# Patient Record
Sex: FEMALE | Race: WHITE | Employment: OTHER | URBAN - METROPOLITAN AREA
[De-identification: names, ages, dates, MRNs, and addresses within clinical notes are randomized per-mention and may not be internally consistent; named-entity substitution may affect disease eponyms.]

---

## 2017-01-12 ENCOUNTER — ANESTHESIA EVENT (OUTPATIENT)
Dept: SURGERY | Age: 72
End: 2017-01-12
Payer: MEDICARE

## 2017-01-12 PROBLEM — R00.1 BRADYCARDIA: Status: ACTIVE | Noted: 2017-01-12

## 2017-01-12 RX ORDER — SODIUM CHLORIDE 0.9 % (FLUSH) 0.9 %
5-10 SYRINGE (ML) INJECTION AS NEEDED
Status: CANCELLED | OUTPATIENT
Start: 2017-01-12

## 2017-01-12 RX ORDER — HYDROMORPHONE HYDROCHLORIDE 2 MG/ML
0.5 INJECTION, SOLUTION INTRAMUSCULAR; INTRAVENOUS; SUBCUTANEOUS
Status: CANCELLED | OUTPATIENT
Start: 2017-01-12

## 2017-01-12 RX ORDER — NALOXONE HYDROCHLORIDE 0.4 MG/ML
0.1 INJECTION, SOLUTION INTRAMUSCULAR; INTRAVENOUS; SUBCUTANEOUS AS NEEDED
Status: CANCELLED | OUTPATIENT
Start: 2017-01-12

## 2017-01-12 RX ORDER — OXYCODONE HYDROCHLORIDE 5 MG/1
5 TABLET ORAL
Status: CANCELLED | OUTPATIENT
Start: 2017-01-12

## 2017-01-13 ENCOUNTER — SURGERY (OUTPATIENT)
Age: 72
End: 2017-01-13

## 2017-01-13 ENCOUNTER — ANESTHESIA (OUTPATIENT)
Dept: SURGERY | Age: 72
End: 2017-01-13
Payer: MEDICARE

## 2017-01-13 ENCOUNTER — HOSPITAL ENCOUNTER (OUTPATIENT)
Age: 72
Setting detail: OBSERVATION
Discharge: HOME | End: 2017-01-14
Attending: INTERNAL MEDICINE | Admitting: INTERNAL MEDICINE
Payer: MEDICARE

## 2017-01-13 DIAGNOSIS — R00.1 BRADYCARDIA: Primary | ICD-10-CM

## 2017-01-13 PROBLEM — Z95.0 PACEMAKER: Status: ACTIVE | Noted: 2017-01-13

## 2017-01-13 LAB
ALBUMIN SERPL BCP-MCNC: 3.7 G/DL (ref 3.2–4.6)
ALBUMIN/GLOB SERPL: 0.8 {RATIO} (ref 1.2–3.5)
ALP SERPL-CCNC: 66 U/L (ref 50–136)
ALT SERPL-CCNC: 25 U/L (ref 12–65)
ANION GAP BLD CALC-SCNC: 8 MMOL/L (ref 7–16)
APTT PPP: 27.7 SEC (ref 23.5–31.7)
AST SERPL W P-5'-P-CCNC: 22 U/L (ref 15–37)
ATRIAL RATE: 52 BPM
BILIRUB SERPL-MCNC: 0.4 MG/DL (ref 0.2–1.1)
BUN SERPL-MCNC: 25 MG/DL (ref 8–23)
CALCIUM SERPL-MCNC: 9.4 MG/DL (ref 8.3–10.4)
CALCULATED P AXIS, ECG09: NORMAL DEGREES
CALCULATED R AXIS, ECG10: 54 DEGREES
CALCULATED T AXIS, ECG11: 65 DEGREES
CHLORIDE SERPL-SCNC: 107 MMOL/L (ref 98–107)
CO2 SERPL-SCNC: 25 MMOL/L (ref 21–32)
CREAT SERPL-MCNC: 1.51 MG/DL (ref 0.6–1)
DIAGNOSIS, 93000: NORMAL
DIASTOLIC BP, ECG02: NORMAL MMHG
ERYTHROCYTE [DISTWIDTH] IN BLOOD BY AUTOMATED COUNT: 13.6 % (ref 11.9–14.6)
GLOBULIN SER CALC-MCNC: 4.4 G/DL (ref 2.3–3.5)
GLUCOSE BLD STRIP.AUTO-MCNC: 109 MG/DL (ref 65–100)
GLUCOSE BLD STRIP.AUTO-MCNC: 125 MG/DL (ref 65–100)
GLUCOSE BLD STRIP.AUTO-MCNC: 227 MG/DL (ref 65–100)
GLUCOSE SERPL-MCNC: 206 MG/DL (ref 65–100)
HCT VFR BLD AUTO: 46.7 % (ref 35.8–46.3)
HGB BLD-MCNC: 14.5 G/DL (ref 11.7–15.4)
INR PPP: 1 (ref 0.9–1.2)
MAGNESIUM SERPL-MCNC: 2.3 MG/DL (ref 1.8–2.4)
MCH RBC QN AUTO: 27.9 PG (ref 26.1–32.9)
MCHC RBC AUTO-ENTMCNC: 31 G/DL (ref 31.4–35)
MCV RBC AUTO: 89.8 FL (ref 79.6–97.8)
P-R INTERVAL, ECG05: NORMAL MS
PLATELET # BLD AUTO: 224 K/UL (ref 150–450)
PMV BLD AUTO: 11.5 FL (ref 10.8–14.1)
POTASSIUM SERPL-SCNC: 4.8 MMOL/L (ref 3.5–5.1)
PROT SERPL-MCNC: 8.1 G/DL (ref 6.3–8.2)
PROTHROMBIN TIME: 10.7 SEC (ref 9.6–12)
Q-T INTERVAL, ECG07: 386 MS
QRS DURATION, ECG06: 74 MS
QTC CALCULATION (BEZET), ECG08: 386 MS
RBC # BLD AUTO: 5.2 M/UL (ref 4.05–5.25)
SODIUM SERPL-SCNC: 140 MMOL/L (ref 136–145)
SYSTOLIC BP, ECG01: NORMAL MMHG
VENTRICULAR RATE, ECG03: 60 BPM
WBC # BLD AUTO: 7.4 K/UL (ref 4.3–11.1)

## 2017-01-13 PROCEDURE — 85730 THROMBOPLASTIN TIME PARTIAL: CPT | Performed by: INTERNAL MEDICINE

## 2017-01-13 PROCEDURE — 77030008467 HC STPLR SKN COVD -B

## 2017-01-13 PROCEDURE — 77030018579 HC SUT TICRN1 COVD -B

## 2017-01-13 PROCEDURE — 33208 INSRT HEART PM ATRIAL & VENT: CPT

## 2017-01-13 PROCEDURE — 85610 PROTHROMBIN TIME: CPT | Performed by: INTERNAL MEDICINE

## 2017-01-13 PROCEDURE — C1892 INTRO/SHEATH,FIXED,PEEL-AWAY: HCPCS

## 2017-01-13 PROCEDURE — 77030031139 HC SUT VCRL2 J&J -A

## 2017-01-13 PROCEDURE — 76060000033 HC ANESTHESIA 1 TO 1.5 HR: Performed by: INTERNAL MEDICINE

## 2017-01-13 PROCEDURE — C1785 PMKR, DUAL, RATE-RESP: HCPCS

## 2017-01-13 PROCEDURE — 99218 HC RM OBSERVATION: CPT

## 2017-01-13 PROCEDURE — 82962 GLUCOSE BLOOD TEST: CPT

## 2017-01-13 PROCEDURE — 74011636637 HC RX REV CODE- 636/637: Performed by: INTERNAL MEDICINE

## 2017-01-13 PROCEDURE — 85027 COMPLETE CBC AUTOMATED: CPT | Performed by: INTERNAL MEDICINE

## 2017-01-13 PROCEDURE — 74011000272 HC RX REV CODE- 272: Performed by: INTERNAL MEDICINE

## 2017-01-13 PROCEDURE — 74011250637 HC RX REV CODE- 250/637: Performed by: ANESTHESIOLOGY

## 2017-01-13 PROCEDURE — 77030012935 HC DRSG AQUACEL BMS -B

## 2017-01-13 PROCEDURE — 93005 ELECTROCARDIOGRAM TRACING: CPT | Performed by: INTERNAL MEDICINE

## 2017-01-13 PROCEDURE — C1898 LEAD, PMKR, OTHER THAN TRANS: HCPCS

## 2017-01-13 PROCEDURE — 80053 COMPREHEN METABOLIC PANEL: CPT | Performed by: INTERNAL MEDICINE

## 2017-01-13 PROCEDURE — 74011250637 HC RX REV CODE- 250/637: Performed by: INTERNAL MEDICINE

## 2017-01-13 PROCEDURE — 74011000250 HC RX REV CODE- 250

## 2017-01-13 PROCEDURE — A9270 NON-COVERED ITEM OR SERVICE: HCPCS | Performed by: INTERNAL MEDICINE

## 2017-01-13 PROCEDURE — 74011250636 HC RX REV CODE- 250/636: Performed by: ANESTHESIOLOGY

## 2017-01-13 PROCEDURE — 74011250636 HC RX REV CODE- 250/636

## 2017-01-13 PROCEDURE — L3670 SO ACRO/CLAV CAN WEB PRE OTS: HCPCS

## 2017-01-13 PROCEDURE — 74011000250 HC RX REV CODE- 250: Performed by: INTERNAL MEDICINE

## 2017-01-13 PROCEDURE — 74011250636 HC RX REV CODE- 250/636: Performed by: INTERNAL MEDICINE

## 2017-01-13 PROCEDURE — 83735 ASSAY OF MAGNESIUM: CPT | Performed by: INTERNAL MEDICINE

## 2017-01-13 RX ORDER — AMIODARONE HYDROCHLORIDE 150 MG/3ML
INJECTION, SOLUTION INTRAVENOUS AS NEEDED
Status: DISCONTINUED | OUTPATIENT
Start: 2017-01-13 | End: 2017-01-13 | Stop reason: HOSPADM

## 2017-01-13 RX ORDER — TEMAZEPAM 30 MG/1
30 CAPSULE ORAL
Status: DISCONTINUED | OUTPATIENT
Start: 2017-01-13 | End: 2017-01-14 | Stop reason: HOSPADM

## 2017-01-13 RX ORDER — VENLAFAXINE HYDROCHLORIDE 75 MG/1
75 CAPSULE, EXTENDED RELEASE ORAL DAILY
Status: DISCONTINUED | OUTPATIENT
Start: 2017-01-14 | End: 2017-01-14 | Stop reason: HOSPADM

## 2017-01-13 RX ORDER — METOPROLOL TARTRATE 5 MG/5ML
INJECTION INTRAVENOUS AS NEEDED
Status: DISCONTINUED | OUTPATIENT
Start: 2017-01-13 | End: 2017-01-13 | Stop reason: HOSPADM

## 2017-01-13 RX ORDER — MELOXICAM 7.5 MG/1
15 TABLET ORAL DAILY
Status: DISCONTINUED | OUTPATIENT
Start: 2017-01-14 | End: 2017-01-13 | Stop reason: SDUPTHER

## 2017-01-13 RX ORDER — SODIUM CHLORIDE 0.9 % (FLUSH) 0.9 %
5-10 SYRINGE (ML) INJECTION EVERY 8 HOURS
Status: DISCONTINUED | OUTPATIENT
Start: 2017-01-13 | End: 2017-01-13

## 2017-01-13 RX ORDER — METOPROLOL TARTRATE 50 MG/1
50 TABLET ORAL 2 TIMES DAILY
Status: DISCONTINUED | OUTPATIENT
Start: 2017-01-13 | End: 2017-01-14 | Stop reason: HOSPADM

## 2017-01-13 RX ORDER — MIDAZOLAM HYDROCHLORIDE 1 MG/ML
2 INJECTION, SOLUTION INTRAMUSCULAR; INTRAVENOUS
Status: DISCONTINUED | OUTPATIENT
Start: 2017-01-13 | End: 2017-01-13

## 2017-01-13 RX ORDER — CEPHALEXIN 500 MG/1
500 CAPSULE ORAL 3 TIMES DAILY
Status: DISCONTINUED | OUTPATIENT
Start: 2017-01-14 | End: 2017-01-14 | Stop reason: HOSPADM

## 2017-01-13 RX ORDER — SODIUM CHLORIDE 0.9 % (FLUSH) 0.9 %
5-10 SYRINGE (ML) INJECTION AS NEEDED
Status: DISCONTINUED | OUTPATIENT
Start: 2017-01-13 | End: 2017-01-13

## 2017-01-13 RX ORDER — SODIUM CHLORIDE, SODIUM LACTATE, POTASSIUM CHLORIDE, CALCIUM CHLORIDE 600; 310; 30; 20 MG/100ML; MG/100ML; MG/100ML; MG/100ML
100 INJECTION, SOLUTION INTRAVENOUS CONTINUOUS
Status: DISCONTINUED | OUTPATIENT
Start: 2017-01-13 | End: 2017-01-13

## 2017-01-13 RX ORDER — CEFAZOLIN SODIUM IN 0.9 % NACL 2 G/50 ML
2 INTRAVENOUS SOLUTION, PIGGYBACK (ML) INTRAVENOUS ONCE
Status: COMPLETED | OUTPATIENT
Start: 2017-01-13 | End: 2017-01-13

## 2017-01-13 RX ORDER — PROPOFOL 10 MG/ML
INJECTION, EMULSION INTRAVENOUS
Status: DISCONTINUED | OUTPATIENT
Start: 2017-01-13 | End: 2017-01-13 | Stop reason: HOSPADM

## 2017-01-13 RX ORDER — MELOXICAM 15 MG/1
15 TABLET ORAL DAILY
Status: DISCONTINUED | OUTPATIENT
Start: 2017-01-14 | End: 2017-01-14 | Stop reason: HOSPADM

## 2017-01-13 RX ORDER — SODIUM CHLORIDE 9 MG/ML
25 INJECTION, SOLUTION INTRAVENOUS CONTINUOUS
Status: DISCONTINUED | OUTPATIENT
Start: 2017-01-13 | End: 2017-01-13

## 2017-01-13 RX ORDER — PRAVASTATIN SODIUM 40 MG/1
80 TABLET ORAL
Status: DISCONTINUED | OUTPATIENT
Start: 2017-01-13 | End: 2017-01-14 | Stop reason: HOSPADM

## 2017-01-13 RX ORDER — SODIUM CHLORIDE 0.9 % (FLUSH) 0.9 %
5-10 SYRINGE (ML) INJECTION EVERY 8 HOURS
Status: DISCONTINUED | OUTPATIENT
Start: 2017-01-13 | End: 2017-01-14 | Stop reason: HOSPADM

## 2017-01-13 RX ORDER — FAMOTIDINE 20 MG/1
20 TABLET, FILM COATED ORAL ONCE
Status: DISCONTINUED | OUTPATIENT
Start: 2017-01-13 | End: 2017-01-13

## 2017-01-13 RX ORDER — PROPOFOL 10 MG/ML
INJECTION, EMULSION INTRAVENOUS AS NEEDED
Status: DISCONTINUED | OUTPATIENT
Start: 2017-01-13 | End: 2017-01-13 | Stop reason: HOSPADM

## 2017-01-13 RX ORDER — SODIUM CHLORIDE 0.9 % (FLUSH) 0.9 %
5-10 SYRINGE (ML) INJECTION AS NEEDED
Status: DISCONTINUED | OUTPATIENT
Start: 2017-01-13 | End: 2017-01-14 | Stop reason: HOSPADM

## 2017-01-13 RX ORDER — TEMAZEPAM 15 MG/1
30 CAPSULE ORAL
Status: DISCONTINUED | OUTPATIENT
Start: 2017-01-13 | End: 2017-01-13 | Stop reason: SDUPTHER

## 2017-01-13 RX ORDER — GLIMEPIRIDE 4 MG/1
4 TABLET ORAL 2 TIMES DAILY WITH MEALS
Status: DISCONTINUED | OUTPATIENT
Start: 2017-01-14 | End: 2017-01-14 | Stop reason: HOSPADM

## 2017-01-13 RX ORDER — CEFAZOLIN SODIUM IN 0.9 % NACL 2 G/50 ML
2 INTRAVENOUS SOLUTION, PIGGYBACK (ML) INTRAVENOUS EVERY 8 HOURS
Status: DISCONTINUED | OUTPATIENT
Start: 2017-01-13 | End: 2017-01-13 | Stop reason: SDUPTHER

## 2017-01-13 RX ORDER — LORAZEPAM 1 MG/1
1 TABLET ORAL
Status: DISCONTINUED | OUTPATIENT
Start: 2017-01-13 | End: 2017-01-14 | Stop reason: HOSPADM

## 2017-01-13 RX ORDER — CEFAZOLIN SODIUM IN 0.9 % NACL 2 G/50 ML
2 INTRAVENOUS SOLUTION, PIGGYBACK (ML) INTRAVENOUS EVERY 8 HOURS
Status: COMPLETED | OUTPATIENT
Start: 2017-01-14 | End: 2017-01-14

## 2017-01-13 RX ORDER — HYDROCODONE BITARTRATE AND ACETAMINOPHEN 7.5; 325 MG/1; MG/1
1 TABLET ORAL AS NEEDED
Status: DISCONTINUED | OUTPATIENT
Start: 2017-01-13 | End: 2017-01-14 | Stop reason: HOSPADM

## 2017-01-13 RX ORDER — PRAVASTATIN SODIUM 80 MG/1
80 TABLET ORAL
Status: DISCONTINUED | OUTPATIENT
Start: 2017-01-13 | End: 2017-01-13 | Stop reason: SDUPTHER

## 2017-01-13 RX ORDER — LIDOCAINE HYDROCHLORIDE 10 MG/ML
0.1 INJECTION INFILTRATION; PERINEURAL AS NEEDED
Status: DISCONTINUED | OUTPATIENT
Start: 2017-01-13 | End: 2017-01-13

## 2017-01-13 RX ORDER — METFORMIN HYDROCHLORIDE 500 MG/1
1000 TABLET ORAL 2 TIMES DAILY WITH MEALS
Status: DISCONTINUED | OUTPATIENT
Start: 2017-01-13 | End: 2017-01-14 | Stop reason: HOSPADM

## 2017-01-13 RX ORDER — BUPIVACAINE HYDROCHLORIDE AND EPINEPHRINE 5; 5 MG/ML; UG/ML
60 INJECTION, SOLUTION EPIDURAL; INTRACAUDAL; PERINEURAL ONCE
Status: COMPLETED | OUTPATIENT
Start: 2017-01-13 | End: 2017-01-13

## 2017-01-13 RX ORDER — INSULIN LISPRO 100 [IU]/ML
INJECTION, SOLUTION INTRAVENOUS; SUBCUTANEOUS
Status: DISCONTINUED | OUTPATIENT
Start: 2017-01-13 | End: 2017-01-14 | Stop reason: HOSPADM

## 2017-01-13 RX ORDER — FENTANYL CITRATE 50 UG/ML
100 INJECTION, SOLUTION INTRAMUSCULAR; INTRAVENOUS ONCE
Status: DISCONTINUED | OUTPATIENT
Start: 2017-01-13 | End: 2017-01-13

## 2017-01-13 RX ADMIN — METOPROLOL TARTRATE 2.5 MG: 5 INJECTION INTRAVENOUS at 17:53

## 2017-01-13 RX ADMIN — PROPOFOL 100 MCG/KG/MIN: 10 INJECTION, EMULSION INTRAVENOUS at 17:16

## 2017-01-13 RX ADMIN — CEFAZOLIN 2 G: 1 INJECTION, POWDER, FOR SOLUTION INTRAMUSCULAR; INTRAVENOUS; PARENTERAL at 17:15

## 2017-01-13 RX ADMIN — Medication 5 ML: at 22:17

## 2017-01-13 RX ADMIN — METOPROLOL TARTRATE 2.5 MG: 5 INJECTION INTRAVENOUS at 17:57

## 2017-01-13 RX ADMIN — NEOMYCIN AND POLYMYXIN B SULFATES: 40; 200000 IRRIGANT IRRIGATION at 18:00

## 2017-01-13 RX ADMIN — PROPOFOL 20 MG: 10 INJECTION, EMULSION INTRAVENOUS at 17:16

## 2017-01-13 RX ADMIN — INSULIN LISPRO 6 UNITS: 100 INJECTION, SOLUTION INTRAVENOUS; SUBCUTANEOUS at 22:00

## 2017-01-13 RX ADMIN — TEMAZEPAM 30 MG: 30 CAPSULE ORAL at 22:10

## 2017-01-13 RX ADMIN — HYDROCODONE BITARTRATE AND ACETAMINOPHEN 1 TABLET: 7.5; 325 TABLET ORAL at 18:46

## 2017-01-13 RX ADMIN — BUPIVACAINE HYDROCHLORIDE AND EPINEPHRINE 300 MG: 5; 5 INJECTION, SOLUTION EPIDURAL; INTRACAUDAL; PERINEURAL at 18:00

## 2017-01-13 RX ADMIN — SODIUM CHLORIDE, SODIUM LACTATE, POTASSIUM CHLORIDE, AND CALCIUM CHLORIDE: 600; 310; 30; 20 INJECTION, SOLUTION INTRAVENOUS at 17:12

## 2017-01-13 RX ADMIN — AMIODARONE HYDROCHLORIDE 150 MG: 150 INJECTION, SOLUTION INTRAVENOUS at 17:50

## 2017-01-13 RX ADMIN — LORAZEPAM 1 MG: 1 TABLET ORAL at 22:10

## 2017-01-13 RX ADMIN — METOPROLOL TARTRATE 50 MG: 50 TABLET ORAL at 22:27

## 2017-01-13 RX ADMIN — PRAVASTATIN SODIUM 80 MG: 40 TABLET ORAL at 22:15

## 2017-01-13 NOTE — ANESTHESIA PREPROCEDURE EVALUATION
Anesthetic History   No history of anesthetic complications            Review of Systems / Medical History  Patient summary reviewed and pertinent labs reviewed    Pulmonary    COPD      Smoker (Former smoker - quit 5 years ago)         Neuro/Psych   Within defined limits           Cardiovascular    Hypertension        Dysrhythmias : atrial flutter and atrial fibrillation      Exercise tolerance: <4 METS  Comments: Echo 11/2016 - normal LVEF   GI/Hepatic/Renal     GERD: well controlled      PUD     Endo/Other    Diabetes: well controlled, type 2  Hypothyroidism: well controlled  Obesity and arthritis     Other Findings   Comments: H/o DVT and PE (hospitalzied for 1 month) over 15 yrs ago           Physical Exam    Airway  Mallampati: III  TM Distance: < 4 cm  Neck ROM: normal range of motion   Mouth opening: Normal     Cardiovascular    Rhythm: irregular  Rate: normal         Dental    Dentition: Lower dentition intact and Upper partial plate     Pulmonary                 Abdominal  GI exam deferred       Other Findings            Anesthetic Plan    ASA: 3  Anesthesia type: total IV anesthesia          Induction: Intravenous  Anesthetic plan and risks discussed with: Patient

## 2017-01-13 NOTE — IP AVS SNAPSHOT
Radha Wetzel 
 
 
 [address] [id number] 
[phone] Patient: Earnestine Boxer MRN: [id number] [facility]:4/0/6860 You are allergic to the following Allergen Reactions Aspirin Nausea and Vomiting Recent Documentation Height Weight BMI OB Status Smoking Status 1.803 m 110.9 kg 34.1 kg/m2 Hysterectomy Former Smoker Emergency Contacts Name Discharge Info Relation Home Work Mobile JESSICA Amaya DISCHARGE CAREGIVER [3] Spouse [3] [phone] Brenda Ray DISCHARGE CAREGIVER [3] Daughter [21] [phone] About your hospitalization You were admitted on:  January 13, 2017 You last received care in the:  [facility] 3 CLINICAL OBSERVATION You were discharged on:  January 14, 2017 Unit phone number:  [phone] Why you were hospitalized Your primary diagnosis was:  Pacemaker Your diagnoses also included:  Bradycardia Providers Seen During Your Hospitalizations Provider Role Specialty Primary office phone Danielle Way MD Attending Provider Cardiology [phone] Your Primary Care Physician (PCP) Primary Care Physician Office Phone Office Fax Bobby Moore [phone] Follow-up Information Follow up With Details Comments Contact Info Orquidea Gold MD Schedule an appointment as soon as possible for a visit  [address] 
[phone] Danielle Way MD  The office will call you on Monday with a follow up appointment [place] [address] [place] [id number] [phone] Current Discharge Medication List  
  
START taking these medications Dose & Instructions Dispensing Information Comments Morning Noon Evening Bedtime  
 cephALEXin 500 mg capsule Commonly known as:  Suzelsi [place] Your next dose is: Today, Tomorrow Other:  _________ Dose:  500 mg Take 1 Cap by mouth three (3) times daily for 3 days. Quantity:  9 Cap Refills:  0 HYDROcodone-acetaminophen 7.5-325 mg per tablet Commonly known as:  Marcela Michelle Your next dose is: Today, Tomorrow Other:  _________ Dose:  1 Tab Take 1 Tab by mouth as needed. Quantity:  10 Tab Refills:  0  
     
   
   
   
  
 metoprolol tartrate 50 mg tablet Commonly known as:  LOPRESSOR Your next dose is: Today, Tomorrow Other:  _________ Dose:  50 mg Take 1 Tab by mouth two (2) times a day. Quantity:  60 Tab Refills:  6 CONTINUE these medications which have CHANGED Dose & Instructions Dispensing Information Comments Morning Noon Evening Bedtime  
 fluticasone 50 mcg/actuation nasal spray Commonly known as:  Lindsey Spangler What changed:   
- when to take this 
- reasons to take this Your next dose is: Today, Tomorrow Other:  _________ Dose:  2 Spray 2 Sprays by Both Nostrils route daily. Quantity:  1 Bottle Refills:  12 CONTINUE these medications which have NOT CHANGED Dose & Instructions Dispensing Information Comments Morning Noon Evening Bedtime  
 apixaban 5 mg tablet Commonly known as:  [place] Stade Your next dose is: Today, Tomorrow Other:  _________ Dose:  5 mg Take 1 Tab by mouth two (2) times a day. Quantity:  14 Tab Refills:  0  
     
   
   
   
  
 glimepiride 4 mg tablet Commonly known as:  AMARYL Your next dose is: Today, Tomorrow Other:  _________ Dose:  4 mg Take 1 Tab by mouth two (2) times a day. Quantity:  180 Tab Refills:  3 LORazepam 1 mg tablet Commonly known as:  ATIVAN Your next dose is: Today, Tomorrow Other:  _________ Dose:  1 mg Take 1 Tab by mouth every four (4) hours as needed for Anxiety. Max Daily Amount: 6 mg. Quantity:  90 Tab Refills:  5  
     
   
   
   
  
 meloxicam 15 mg tablet Commonly known as:  MOBIC Your next dose is: Today, Tomorrow Other:  _________ Dose:  15 mg Take 1 Tab by mouth daily. Quantity:  90 Tab Refills:  3  
     
   
   
   
  
 metFORMIN 1,000 mg tablet Commonly known as:  GLUCOPHAGE Your next dose is: Today, Tomorrow Other:  _________ Dose:  1000 mg Take 1 Tab by mouth two (2) times daily (with meals). Quantity:  180 Tab Refills:  3  
     
   
   
   
  
 pravastatin 40 mg tablet Commonly known as:  PRAVACHOL Your next dose is: Today, Tomorrow Other:  _________ Dose:  80 mg Take 2 Tabs by mouth nightly. Quantity:  180 Tab Refills:  3  
     
   
   
   
  
 temazepam 30 mg capsule Commonly known as:  RESTORIL Your next dose is: Today, Tomorrow Other:  _________ Dose:  30 mg Take 1 Cap by mouth nightly as needed for Sleep. Max Daily Amount: 30 mg.  
 Quantity:  90 Cap Refills:  3  
     
   
   
   
  
 venlafaxine-SR 75 mg capsule Commonly known as:  EFFEXOR-XR Your next dose is: Today, Tomorrow Other:  _________ Dose:  75 mg Take 1 Cap by mouth daily. Quantity:  90 Cap Refills:  3 Where to Get Your Medications Information on where to get these meds will be given to you by the nurse or doctor. ! Ask your nurse or doctor about these medications  
  cephALEXin 500 mg capsule HYDROcodone-acetaminophen 7.5-325 mg per tablet  
 metoprolol tartrate 50 mg tablet Discharge Instructions Pacemaker Placement: What to Expect at [facility] Your Recovery Pacemaker placement is surgery to put a pacemaker in your chest when you have bradycardia (a slow heart rate). A pacemaker is a small, battery-powered device that sends electrical signals to the heart to keep the heartbeat steady. Thin wires, called leads, carry the signals from the pacemaker to the heart. A pacemaker can prevent or reduce dizziness, fainting, and shortness of breath caused by a slow or unsteady heartbeat. Your chest may be sore where the doctor made the cut (incision) and put in the pacemaker. You also may have a bruise and mild swelling. These symptoms usually get better in 1 to 2 weeks. You may feel a hard ridge along the incision. This usually gets softer in the months after surgery. You may be able to see or feel the outline of the pacemaker under your skin. You will probably be able to go back to work or your usual routine 1 to 2 weeks after surgery. Pacemaker batteries usually last 5 to 15 years. Your doctor will talk to you about how often you will need to have your pacemaker checked. You can safely use most household and office electronics such as kitchen appliances, electric power tools, and computers. You will need to stay away from things with strong magnetic and electrical fields such as an MRI machine (unless your pacemaker is safe for an MRI), welding equipment, and power generators. You can use a cell phone, but keep it at least 6 inches away from your pacemaker. Check with your doctor about what you need to stay away from, what you need to use with care, and what is okay to use. This care sheet gives you a general idea about how long it will take for you to recover. But each person recovers at a different pace. Follow the steps below to get better as quickly as possible. How can you care for yourself at home? Activity · Rest when you feel tired. · Be active. Walking is a good choice. · For 4 to 6 weeks: ¨ Avoid activities that strain your chest or upper arm muscles. This includes pushing a  or vacuum, mopping floors, swimming, or swinging a golf club or tennis racquet. ¨ Do not raise your arm (the one on the side of your body where the pacemaker is located) above your shoulder. ¨ Allow your body to heal. Don't move quickly or lift anything heavy until you are feeling better. · Many people are able to return to work within 1 to 2 weeks after surgery. · Ask your doctor when it is okay for you to have sex. Diet · You can eat your normal diet. If your stomach is upset, try bland, low-fat foods like plain rice, broiled chicken, toast, and yogurt. Medicines · Your doctor will tell you if and when you can restart your medicines. He or she will also give you instructions about taking any new medicines. · If you take aspirin or some other blood thinner, be sure to talk to your doctor. He or she will tell you if and when to start taking this medicine again. Make sure that you understand exactly what your doctor wants you to do. · Be safe with medicines. Read and follow all instructions on the label. ¨ If the doctor gave you a prescription medicine for pain, take it as prescribed. ¨ If you are not taking a prescription pain medicine, ask your doctor if you can take an over-the-counter medicine. ¨ Do not take aspirin, ibuprofen (Advil, Motrin), naproxen (Aleve), or other nonsteroidal anti-inflammatory drugs (NSAIDs) unless your doctor says it is okay. · If your doctor prescribed antibiotics, take them as directed. Do not stop taking them just because you feel better. You need to take the full course of antibiotics. Incision care · If you have strips of tape on the incision, leave the tape on for a week or until it falls off. · Keep the incision dry while it heals. Your doctor may recommend sponge baths for about 7 days, but do not get the incision wet. Your doctor will let you know when you may take showers. After a shower, pat the incision dry.  
· Don't use hydrogen peroxide or alcohol on the incision, which can slow healing. You may cover the area with a gauze bandage if it oozes fluid or rubs against clothing. Change the bandage every day. · Do not take a bath or get into a hot tub for the first 2 weeks, or until your doctor tells you it is okay. Other instructions · Keep a medical ID card with you at all times that says you have a pacemaker. The card should include the  and model information. · Wear medical alert jewelry stating that you have a pacemaker. You can buy this at most [employer]. · Check your pulse as directed by your doctor. · Have your pacemaker checked as often as your doctor recommends. In some cases, this may be done over the phone or the Internet. Your doctor will give you instructions about how to do this. Follow-up care is a key part of your treatment and safety. Be sure to make and go to all appointments, and call your doctor if you are having problems. It's also a good idea to know your test results and keep a list of the medicines you take. When should you call for help? Call 911 anytime you think you may need emergency care. For example, call if: 
· You passed out (lost consciousness). · You have severe trouble breathing. · You have sudden chest pain and shortness of breath, or you cough up blood. · You have symptoms of a heart attack. These may include: ¨ Chest pain or pressure, or a strange feeling in the chest. 
¨ Sweating. ¨ Shortness of breath. ¨ Nausea or vomiting. ¨ Pain, pressure, or a strange feeling in the back, neck, jaw, or upper belly or in one or both shoulders or arms. ¨ Lightheadedness or sudden weakness. ¨ A fast or irregular heartbeat. After you call 911, the  may tell you to chew 1 adult-strength or 2 to 4 low-dose aspirin. Wait for an ambulance. Do not try to drive yourself. · You have symptoms of a stroke.  These may include: 
¨ Sudden numbness, tingling, weakness, or loss of movement in your face, arm, or leg, especially on only one side of your body. ¨ Sudden vision changes. ¨ Sudden trouble speaking. ¨ Sudden confusion or trouble understanding simple statements. ¨ Sudden problems with walking or balance. ¨ A sudden, severe headache that is different from past headaches. Call your doctor now or seek immediate medical care if: 
· Your heartbeat feels very fast or slow, skips beats, or flutters. · You are dizzy or lightheaded, or you feel like you may faint. · You have new or increased shortness of breath. · You have pain that does not get better after you take pain medicine. · You have loose stitches, or your incision comes open. · Bright red blood has soaked through the bandage over your incision. · You have signs of infection, such as: 
¨ Increased pain, swelling, warmth, or redness. ¨ Red streaks leading from the incision. ¨ Pus draining from the incision. ¨ A fever. · You have signs of a blood clot, such as: 
¨ Pain in your calf, back of the knee, thigh, or groin. ¨ Redness and swelling in your leg or groin. Watch closely for changes in your health, and be sure to contact your doctor if: 
· You have any problems with your pacemaker. Where can you learn more? Go to http://james-jannet.info/. Enter P344 in the search box to learn more about \"Pacemaker Placement: What to Expect at Home. \" Current as of: August 4, 2016 Content Version: 11.1 © 3568-0260 [employer]. Care instructions adapted under license by [employer] (which disclaims liability or warranty for this information). If you have questions about a medical condition or this instruction, always ask your healthcare professional. [address] [address] any warranty or liability for your use of this information. CARDIAC DEVICE INSTRUCTION SHEET 1.    You should have received a 1-2 weeks follow up appointment upon discharge from the hospital.  If you did not receive this appointment prior to leaving the hospital, please contact us at [phone]. 2.  Keep your incision dry for 14 days. DO NOT put ointments, creams or lotions on the incision for 2 weeks. 3.  Your dressing will be removed at your follow up appointment. If you have sutures/staples, the nurse will remove them at the follow up appointment. 4.  Call us IMMEDIATELY if you develop fever, pain, redness, or drainage at the incision site. 5.  You may use your pacemaker/ICD arm, but keep your arm lower than your shoulder for the first 8 weeks (or until cleared by a physician) to prevent the device lead(s) from moving. 6.  Do not lift more than 10 pounds for 4 weeks and 20 pounds for 8 weeks. 7.  Within 6 weeks you should receive your cardiac device ID card. Carry your card with you at all times. Always show it to any doctor or dentist you see. 8.    You will need to have your device evaluated every 3 months via office, remote, or telephone. 9.  Microwaves WILL NOT harm your device. Warnings do not apply to you. 10.  At airports, always show your cardiac device ID card. You may walk through metal detectors but do not allow the hand held wand near your device. 11.  DO NOT have an MRI without contacting your cardiologists office first.  
 
12.  Please refer to the education booklet given to you at the hospital for the activities and equipment you should avoid. Some may reprogram or interfere with your cardiac device. DISCHARGE SUMMARY from Nurse The following personal items are in your possession at time of discharge: 
 
Dental Appliances: Uppers, With patient Home Medications: Sent to pharmacy Jewelry: None Clothing: At bedside Other Valuables: None PATIENT INSTRUCTIONS: 
 
 
F-face looks uneven A-arms unable to move or move unevenly S-speech slurred or non-existent T-time-call 911 as soon as signs and symptoms begin-DO NOT go Back to bed or wait to see if you get better-TIME IS BRAIN. Warning Signs of HEART ATTACK Call 911 if you have these symptoms: 
? Chest discomfort. Most heart attacks involve discomfort in the center of the chest that lasts more than a few minutes, or that goes away and comes back. It can feel like uncomfortable pressure, squeezing, fullness, or pain. ? Discomfort in other areas of the upper body. Symptoms can include pain or discomfort in one or both arms, the back, neck, jaw, or stomach. ? Shortness of breath with or without chest discomfort. ? Other signs may include breaking out in a cold sweat, nausea, or lightheadedness. Don't wait more than five minutes to call [address]! Fast action can save your life. Calling 911 is almost always the fastest way to get lifesaving treatment. Emergency Medical Services staff can begin treatment when they arrive  up to an hour sooner than if someone gets to the hospital by car. The discharge information has been reviewed with the patient. The patient verbalized understanding. Discharge medications reviewed with the patient and appropriate educational materials and side effects teaching were provided. Discharge Orders None [facility] Announcement We are excited to announce that we are making your provider's discharge notes available to you in [employer]. You will see these notes when they are completed and signed by the physician that discharged you from your recent hospital stay. If you have any questions or concerns about any information you see in [facility], please call the Health Information Department where you were seen or reach out to your Primary Care Provider for more information about your plan of care. Introducing [facility]!    
 Hanh Alcocer introduces [employer] patient portal. Now you can access parts of your medical record, email your doctor's office, and request medication refills online. 1. In your internet browser, go to [email or web address] 2. Click on the First Time User? Click Here link in the Sign In box. You will see the New Member Sign Up page. 3. Enter your [facility] Access Code exactly as it appears below. You will not need to use this code after youve completed the sign-up process. If you do not sign up before the expiration date, you must request a new code. · [facility] Access Code: [email or web address] Expires: 1/18/2017  8:51 AM 
 
4. Enter the last four digits of your Social Security Number (xxxx) and Date of Birth (mm/dd/yyyy) as indicated and click Submit. You will be taken to the next sign-up page. 5. Create a [facility] ID. This will be your [facility] login ID and cannot be changed, so think of one that is secure and easy to remember. 6. Create a [facility] password. You can change your password at any time. 7. Enter your Password Reset Question and Answer. This can be used at a later time if you forget your password. 8. Enter your e-mail address. You will receive e-mail notification when new information is available in [address] 9. Click Sign Up. You can now view and download portions of your medical record. 10. Click the Download Summary menu link to download a portable copy of your medical information. If you have questions, please visit the Frequently Asked Questions section of the [facility] website. Remember, [facility] is NOT to be used for urgent needs. For medical emergencies, dial 911. Now available from your iPhone and Android! General Information Please provide this summary of care documentation to your next provider. Patient Signature:  ____________________________________________________________ Date:  ____________________________________________________________  
  
[place]  Provider Signature: ____________________________________________________________ Date:  ____________________________________________________________

## 2017-01-13 NOTE — PROGRESS NOTES
Report received from  Cath Lab RN. Procedural findings communicated. Intra procedural  medication administration reviewed. Progression of care discussed. Patient received into [id number] [facility] 1 post procedure.      Incision site without bleeding or swelling yes    Dressing dry and intact yes    Patient instructed to limit movement to left upper extremity    Routine post procedural vital signs and site assessment initiated yes

## 2017-01-13 NOTE — ANESTHESIA POSTPROCEDURE EVALUATION
Post-Anesthesia Evaluation and Assessment    Patient: Jeff Barger MRN: [id number]  SSN: [id number]    YOB: 1945  Age: 70 y.o. Sex: female       Cardiovascular Function/Vital Signs  Visit Vitals    /70    Pulse 69    Temp 36.4 °C (97.5 °F)    Resp 17    Ht 5' 11\" (1.803 m)    Wt 110.7 kg (244 lb)    SpO2 94%    BMI 34.03 kg/m2       Patient is status post total IV anesthesia anesthesia for Procedure(s):  PACEMAKER INSERTION. Nausea/Vomiting: None    Postoperative hydration reviewed and adequate. Pain:  Pain Scale 1: Numeric (0 - 10) (01/13/17 1301)  Pain Intensity 1: 0 (01/13/17 1301)   Managed    Neurological Status: At baseline    Mental Status and Level of Consciousness: Arousable    Pulmonary Status:   O2 Device: Nasal cannula (01/13/17 2744)   Adequate oxygenation and airway patent    Complications related to anesthesia: None    Post-anesthesia assessment completed.  No concerns    Signed By: Radha Wang MD     January 13, 2017

## 2017-01-13 NOTE — IP AVS SNAPSHOT
Current Discharge Medication List  
  
Take these medications at their scheduled times Dose & Instructions Dispensing Information Comments Morning Noon Evening Bedtime  
 apixaban 5 mg tablet Commonly known as:  Jimbo Ramirez Your next dose is: Today, Tomorrow Other:  ____________ Dose:  5 mg Take 1 Tab by mouth two (2) times a day. Quantity:  14 Tab Refills:  0  
     
   
   
   
  
 cephALEXin 500 mg capsule Commonly known as:  Delwyn Pay Your next dose is: Today, Tomorrow Other:  ____________ Dose:  500 mg Take 1 Cap by mouth three (3) times daily for 3 days. Quantity:  9 Cap Refills:  0  
     
   
   
   
  
 fluticasone 50 mcg/actuation nasal spray Commonly known as:  Albert Means Your next dose is: Today, Tomorrow Other:  ____________ Dose:  2 Spray 2 Sprays by Both Nostrils route daily. Quantity:  1 Bottle Refills:  12  
     
   
   
   
  
 glimepiride 4 mg tablet Commonly known as:  AMARYL Your next dose is: Today, Tomorrow Other:  ____________ Dose:  4 mg Take 1 Tab by mouth two (2) times a day. Quantity:  180 Tab Refills:  3  
     
   
   
   
  
 meloxicam 15 mg tablet Commonly known as:  MOBIC Your next dose is: Today, Tomorrow Other:  ____________ Dose:  15 mg Take 1 Tab by mouth daily. Quantity:  90 Tab Refills:  3  
     
   
   
   
  
 metFORMIN 1,000 mg tablet Commonly known as:  GLUCOPHAGE Your next dose is: Today, Tomorrow Other:  ____________ Dose:  1000 mg Take 1 Tab by mouth two (2) times daily (with meals). Quantity:  180 Tab Refills:  3  
     
   
   
   
  
 metoprolol tartrate 50 mg tablet Commonly known as:  LOPRESSOR Your next dose is: Today, Tomorrow Other:  ____________ Dose:  50 mg Take 1 Tab by mouth two (2) times a day. Quantity:  60 Tab Refills:  6  
     
   
   
   
  
 pravastatin 40 mg tablet Commonly known as:  PRAVACHOL Your next dose is: Today, Tomorrow Other:  ____________ Dose:  80 mg Take 2 Tabs by mouth nightly. Quantity:  180 Tab Refills:  3  
     
   
   
   
  
 venlafaxine-SR 75 mg capsule Commonly known as:  EFFEXOR-XR Your next dose is: Today, Tomorrow Other:  ____________ Dose:  75 mg Take 1 Cap by mouth daily. Quantity:  90 Cap Refills:  3 Take these medications as needed Dose & Instructions Dispensing Information Comments Morning Noon Evening Bedtime HYDROcodone-acetaminophen 7.5-325 mg per tablet Commonly known as:  Lenon Gauze Your next dose is: Today, Tomorrow Other:  ____________ Dose:  1 Tab Take 1 Tab by mouth as needed. Quantity:  10 Tab Refills:  0 LORazepam 1 mg tablet Commonly known as:  ATIVAN Your next dose is: Today, Tomorrow Other:  ____________ Dose:  1 mg Take 1 Tab by mouth every four (4) hours as needed for Anxiety. Max Daily Amount: 6 mg. Quantity:  90 Tab Refills:  5  
     
   
   
   
  
 temazepam 30 mg capsule Commonly known as:  RESTORIL Your next dose is: Today, Tomorrow Other:  ____________ Dose:  30 mg Take 1 Cap by mouth nightly as needed for Sleep. Max Daily Amount: 30 mg.  
 Quantity:  90 Cap Refills:  3 Where to Get Your Medications Information about where to get these medications is not yet available ! Ask your nurse or doctor about these medications  
  cephALEXin 500 mg capsule HYDROcodone-acetaminophen 7.5-325 mg per tablet  
 metoprolol tartrate 50 mg tablet

## 2017-01-13 NOTE — PROCEDURES
PRE-ELECTROPHYSIOLOGY STUDY DIAGNOSES:  1. Bradycardia due to nonreversible sinus node dysfunction with HR<60 BPM    PROCEDURE PERFORMED:  1. Insertion of a Biotronik MRI DDDR pacemaker. Anesthesia: MAC     Estimated Blood Loss: Less than 10 mL     Specimens: * No specimens in log *     PROCEDURE IN DETAIL: The patient was brought into the EP lab in a fasting  state. The left shoulder was prepped and draped in the usual sterile  fashion. Skin anesthetized with lidocaine with epinephrine. Incision made  in the region of the deltopectoral groove. Pocket was made for the  pacemaker. Access obtained in the left subclavian vein via  the Seldinger technique x 2 over which 2 separate guidewires were placed. Over the first guidewire, a 7-[place] sheath was placed through which a  Biotronik right ventricle lead, model Setrox , 52 cm was placed. The lead  achieved the following values: R waves 6.1mV, threshold  was 1.6 V at 0.5 msec pulse width. This lead was then secured to the  pectoral fascia with 0 Ti-Cron x2. Over the second guidewire, a 7-[place] sheath was placed through which a Biotronik right  atrial lead, model Solia , 45 cm was placed, which achieved the following  values: P waves were 3.7mV, threshold was 1.2 volt at 0.4msec pulse width. This lead was then secured to the pectoral fascia with 0 Ti-Cron x2. Pocket was irrigated with triple antibiotic flush. The leads were  connected to a Biotronik pacemaker, model Eluna 8 DR-T  serial [id number]. The leads and pacemaker were then placed  in the pocket area. Fascial layer was closed with 2-0 Vicryl, followed by  a next layer of 3-0 Vicryl, followed by a superficial layer of staples. The wound was dressed. The patient recovered from his sedation and  transferred from the lab in a stable condition.     IMPRESSION: Successful implantation of a Biotronik MRI  pacemaker  for treatment of symptomatic sinus node dysfunction bradycardia

## 2017-01-13 NOTE — PROGRESS NOTES
TRANSFER - OUT REPORT:    Verbal report given to [employer] on [employer]  being transferred to [facility](unit) for routine progression of care       Report consisted of patients Situation, Background, Assessment and   Recommendations(SBAR). Information from the following report(s) Procedure Summary was reviewed with the receiving nurse. Lines:   Peripheral IV 01/13/17 Left Femoral (Active)        Opportunity for questions and clarification was provided.

## 2017-01-14 ENCOUNTER — APPOINTMENT (OUTPATIENT)
Dept: GENERAL RADIOLOGY | Age: 72
End: 2017-01-14
Attending: INTERNAL MEDICINE
Payer: MEDICARE

## 2017-01-14 VITALS
SYSTOLIC BLOOD PRESSURE: 155 MMHG | HEART RATE: 70 BPM | RESPIRATION RATE: 18 BRPM | OXYGEN SATURATION: 90 % | TEMPERATURE: 97.9 F | BODY MASS INDEX: 34.23 KG/M2 | HEIGHT: 71 IN | DIASTOLIC BLOOD PRESSURE: 76 MMHG | WEIGHT: 244.5 LBS

## 2017-01-14 LAB — GLUCOSE BLD STRIP.AUTO-MCNC: 155 MG/DL (ref 65–100)

## 2017-01-14 PROCEDURE — 74011250637 HC RX REV CODE- 250/637: Performed by: INTERNAL MEDICINE

## 2017-01-14 PROCEDURE — 71020 XR CHEST PA LAT: CPT

## 2017-01-14 PROCEDURE — 74011250636 HC RX REV CODE- 250/636: Performed by: INTERNAL MEDICINE

## 2017-01-14 PROCEDURE — 74011636637 HC RX REV CODE- 636/637: Performed by: INTERNAL MEDICINE

## 2017-01-14 PROCEDURE — A9270 NON-COVERED ITEM OR SERVICE: HCPCS | Performed by: INTERNAL MEDICINE

## 2017-01-14 PROCEDURE — 99218 HC RM OBSERVATION: CPT

## 2017-01-14 PROCEDURE — 74011250637 HC RX REV CODE- 250/637: Performed by: ANESTHESIOLOGY

## 2017-01-14 PROCEDURE — 82962 GLUCOSE BLOOD TEST: CPT

## 2017-01-14 RX ORDER — CEPHALEXIN 500 MG/1
500 CAPSULE ORAL 3 TIMES DAILY
Qty: 9 CAP | Refills: 0 | Status: SHIPPED | OUTPATIENT
Start: 2017-01-14 | End: 2017-01-17

## 2017-01-14 RX ORDER — METOPROLOL TARTRATE 50 MG/1
50 TABLET ORAL 2 TIMES DAILY
Qty: 60 TAB | Refills: 6 | Status: SHIPPED | OUTPATIENT
Start: 2017-01-14 | End: 2017-07-11 | Stop reason: SDUPTHER

## 2017-01-14 RX ORDER — HYDROCODONE BITARTRATE AND ACETAMINOPHEN 7.5; 325 MG/1; MG/1
1 TABLET ORAL AS NEEDED
Qty: 10 TAB | Refills: 0 | Status: SHIPPED | OUTPATIENT
Start: 2017-01-14 | End: 2017-05-04

## 2017-01-14 RX ADMIN — INSULIN LISPRO 3 UNITS: 100 INJECTION, SOLUTION INTRAVENOUS; SUBCUTANEOUS at 07:40

## 2017-01-14 RX ADMIN — Medication 5 ML: at 04:30

## 2017-01-14 RX ADMIN — CEFAZOLIN 2 G: 1 INJECTION, POWDER, FOR SOLUTION INTRAMUSCULAR; INTRAVENOUS; PARENTERAL at 07:45

## 2017-01-14 RX ADMIN — CEFAZOLIN 2 G: 1 INJECTION, POWDER, FOR SOLUTION INTRAMUSCULAR; INTRAVENOUS; PARENTERAL at 01:31

## 2017-01-14 RX ADMIN — METOPROLOL TARTRATE 50 MG: 50 TABLET ORAL at 07:41

## 2017-01-14 RX ADMIN — HYDROCODONE BITARTRATE AND ACETAMINOPHEN 1 TABLET: 7.5; 325 TABLET ORAL at 07:40

## 2017-01-14 RX ADMIN — VENLAFAXINE HYDROCHLORIDE 75 MG: 75 CAPSULE, EXTENDED RELEASE ORAL at 09:19

## 2017-01-14 RX ADMIN — MELOXICAM 15 MG: 15 TABLET ORAL at 09:21

## 2017-01-14 RX ADMIN — GLIMEPIRIDE 4 MG: 4 TABLET ORAL at 09:20

## 2017-01-14 NOTE — PROGRESS NOTES
Bedside and Verbal shift change report given to Toyin Shea RN (oncoming nurse) by self Kiana Hint nurse). Report included the following information SBAR, Kardex, MAR and Recent Results.

## 2017-01-14 NOTE — DISCHARGE SUMMARY
[facility] Discharge Summary     Patient ID:  Khanh Oviedo  [id number]  89 y.o.  1945    Admit date: 1/13/2017    Discharge date:  1/14/2017    Admitting Physician: Lashawn Calderon MD     Discharge Physician: SALO Machado/Dr. Krista Barker    Admission Diagnoses: Bradycardia [R00.1]    Discharge Diagnoses:   Patient Active Problem List    Diagnosis Date Noted    Pacemaker 01/13/2017    Bradycardia 01/12/2017    [facility] flutter ([facility] 10/21/2016    Essential hypertension 10/21/2016    H/O breast implant 04/17/2013    Tobacco abuse 04/17/2013    Chronic bronchitis ([facility] 04/17/2013    Anxiety 04/17/2013    Insomnia 04/17/2013    Surgical menopause 04/17/2013    Non Hodgkin's lymphoma ([facility] 04/17/2013    history of PUD (peptic ulcer disease) 04/17/2013    GERD (gastroesophageal reflux disease) 04/17/2013    Hyperlipidemia 04/17/2013    DM type 2 (diabetes mellitus, type 2) ([facility] 04/17/2013       Cardiology Procedures this admission:  implantation of dual chamber pacemaker  Consults: None    Hospital Course: Patient was seen at the office of [facility] by Dr. Darrian Brown in follow up. She complained of ongoing weakness and fatigue. EKG showed sinus bradycardia with junctional escape. He was subsequently scheduled for a pacemaker implantation at [facility] on 1/13/17. Patient was taken to the EP lab and underwent Biotronik MRI safe dual chamber pacemaker implantation by Dr. Darrian Brown. Patient tolerated the procedure well and was taken to the telemetry floor for recovery. Follow up chest xray showed no pneumothorax. The following morning patient was up feeling well without any complaints of chest pain, shortness of breath, or palpitations. Patient's left subclavian site was clean, dry and intact without hematoma. Patient was determined stable and ready for discharge. Patient was instructed on the importance of medication compliance and outpatient follow up.   The patient will follow up with [facility] for device check in 10-14 days. DISPOSITION: Patient has been instructed to keep affected arm below shoulder level for the next 4 weeks or until cleared by doctor. The arm sling should be worn while sleeping. The dressing will be removed at follow up visit. The incision site must be kept clean and dry. The patient may shower in a few days. Lotions, powders, or creams should be avoided as these can increase the risk of infection. The affected arm should not be used for any pushing, pulling, or lifting until cleared by doctor. Driving is prohibited until cleared by doctor in a follow up appointment. Any signs of infection including increased redness, suspicious drainage, or unexplained fever should be reported to the doctor immediately by calling [phone].           Discharge Exam:   Visit Vitals    /76 (BP 1 Location: Right arm, BP Patient Position: At rest)    Pulse 70    Temp 97.9 °F (36.6 °C)    Resp 18    Ht 5' 11\" (1.803 m)    Wt 110.9 kg (244 lb 8 oz)    SpO2 90%    BMI 34.1 kg/m2      Physical Exam:  General: Well Developed, Well Nourished, No Acute Distress, Alert & Oriented  Neck: supple, no JVD  Heart: S1S2 with RRR  Lungs: Clear throughout auscultation bilaterally without adventitious sounds  Abd: soft, nontender, nondistended, with good bowel sounds  Ext: warm, no edema, calves supple/nontender, pulses 2+ bilaterally  Left subclavian site: clean, dry, and intact without hematoma or bleeding  Skin: warm and dry      Recent Results (from the past 24 hour(s))   CBC W/O DIFF    Collection Time: 01/13/17  1:14 PM   Result Value Ref Range    WBC 7.4 4.3 - 11.1 K/uL    RBC 5.20 4.05 - 5.25 M/uL    HGB 14.5 11.7 - 15.4 g/dL    HCT 46.7 (H) 35.8 - 46.3 %    MCV 89.8 79.6 - 97.8 FL    MCH 27.9 26.1 - 32.9 PG    MCHC 31.0 (L) 31.4 - 35.0 g/dL    RDW 13.6 11.9 - 14.6 %    PLATELET 328 629 - 033 K/uL    MPV 11.5 10.8 - 14.1 FL   MAGNESIUM    Collection Time: 01/13/17  1:14 PM   Result Value Ref Range    Magnesium 2.3 1.8 - 2.4 mg/dL   METABOLIC PANEL, COMPREHENSIVE    Collection Time: 01/13/17  1:14 PM   Result Value Ref Range    Sodium 140 136 - 145 mmol/L    Potassium 4.8 3.5 - 5.1 mmol/L    Chloride 107 98 - 107 mmol/L    CO2 25 21 - 32 mmol/L    Anion gap 8 7 - 16 mmol/L    Glucose 206 (H) 65 - 100 mg/dL    BUN 25 (H) 8 - 23 MG/DL    Creatinine 1.51 (H) 0.6 - 1.0 MG/DL    GFR est AA 44 (L) >60 ml/min/1.73m2    GFR est non-AA 36 (L) >60 ml/min/1.73m2    Calcium 9.4 8.3 - 10.4 MG/DL    Bilirubin, total 0.4 0.2 - 1.1 MG/DL    ALT 25 12 - 65 U/L    AST 22 15 - 37 U/L    Alk.  phosphatase 66 50 - 136 U/L    Protein, total 8.1 6.3 - 8.2 g/dL    Albumin 3.7 3.2 - 4.6 g/dL    Globulin 4.4 (H) 2.3 - 3.5 g/dL    A-G Ratio 0.8 (L) 1.2 - 3.5     PROTHROMBIN TIME + INR    Collection Time: 01/13/17  1:14 PM   Result Value Ref Range    Prothrombin time 10.7 9.6 - 12.0 sec    INR 1.0 0.9 - 1.2     PTT    Collection Time: 01/13/17  1:14 PM   Result Value Ref Range    aPTT 27.7 23.5 - 31.7 SEC   EKG, 12 LEAD, INITIAL    Collection Time: 01/13/17  1:47 PM   Result Value Ref Range    Systolic BP  mmHg    Diastolic BP  mmHg    Ventricular Rate 60 BPM    Atrial Rate 52 BPM    P-R Interval  ms    QRS Duration 74 ms    Q-T Interval 386 ms    QTC Calculation (Bezet) 386 ms    Calculated P Axis  degrees    Calculated R Axis 54 degrees    Calculated T Axis 65 degrees    Diagnosis       Marked sinus bradycardia  Junctional bradycardia  Anteroseptal infarct (cited on or before 24-JUL-2013)  Abnormal ECG  When compared with ECG of 22-DEC-2016 07:53,  Non-specific change in ST segment in Inferior leads  Confirmed by Cindy Green MD (), OCTAVIO MENDOZA ([id number]) on 1/13/2017 2:04:34 PM     GLUCOSE, POC    Collection Time: 01/13/17  5:28 PM   Result Value Ref Range    Glucose (POC) 109 (H) 65 - 100 mg/dL   GLUCOSE, POC    Collection Time: 01/13/17  6:20 PM   Result Value Ref Range    Glucose (POC) 125 (H) 65 - 100 mg/dL   EKG, 12 LEAD, INITIAL    Collection Time: 01/13/17  6:38 PM   Result Value Ref Range    Systolic BP  mmHg    Diastolic BP  mmHg    Ventricular Rate 70 BPM    Atrial Rate 70 BPM    P-R Interval 168 ms    QRS Duration 78 ms    Q-T Interval 378 ms    QTC Calculation (Bezet) 408 ms    Calculated P Axis 46 degrees    Calculated R Axis 53 degrees    Calculated T Axis 49 degrees    Diagnosis       Electronic atrial pacemaker  Low voltage QRS  Cannot rule out Anteroseptal infarct , age undetermined  Abnormal ECG     GLUCOSE, POC    Collection Time: 01/13/17 10:02 PM   Result Value Ref Range    Glucose (POC) 227 (H) 65 - 100 mg/dL   GLUCOSE, POC    Collection Time: 01/14/17  6:10 AM   Result Value Ref Range    Glucose (POC) 155 (H) 65 - 100 mg/dL         Patient Instructions:   Current Discharge Medication List      START taking these medications    Details   metoprolol tartrate (LOPRESSOR) 50 mg tablet Take 1 Tab by mouth two (2) times a day. Qty: 60 Tab, Refills: 6    Associated Diagnoses: Bradycardia      cephALEXin (KEFLEX) 500 mg capsule Take 1 Cap by mouth three (3) times daily for 3 days. Qty: 9 Cap, Refills: 0    Associated Diagnoses: Bradycardia      HYDROcodone-acetaminophen (NORCO) 7.5-325 mg per tablet Take 1 Tab by mouth as needed. Qty: 10 Tab, Refills: 0         CONTINUE these medications which have NOT CHANGED    Details   LORazepam (ATIVAN) 1 mg tablet Take 1 Tab by mouth every four (4) hours as needed for Anxiety. Max Daily Amount: 6 mg. Qty: 90 Tab, Refills: 5      pravastatin (PRAVACHOL) 40 mg tablet Take 2 Tabs by mouth nightly. Qty: 180 Tab, Refills: 3    Associated Diagnoses: Mixed hyperlipidemia      metFORMIN (GLUCOPHAGE) 1,000 mg tablet Take 1 Tab by mouth two (2) times daily (with meals).   Qty: 180 Tab, Refills: 3    Associated Diagnoses: Type 2 diabetes mellitus without complication, without long-term current use of insulin (HCC)      meloxicam (MOBIC) 15 mg tablet Take 1 Tab by mouth daily. Qty: 90 Tab, Refills: 3      glimepiride (AMARYL) 4 mg tablet Take 1 Tab by mouth two (2) times a day. Qty: 180 Tab, Refills: 3    Associated Diagnoses: Type 2 diabetes mellitus without complication, without long-term current use of insulin ([facility])      temazepam (RESTORIL) 30 mg capsule Take 1 Cap by mouth nightly as needed for Sleep. Max Daily Amount: 30 mg.  Qty: 90 Cap, Refills: 3      venlafaxine-SR (EFFEXOR-XR) 75 mg capsule Take 1 Cap by mouth daily. Qty: 90 Cap, Refills: 3      fluticasone (FLONASE) 50 mcg/actuation nasal spray 2 Sprays by Both Nostrils route daily. Qty: 1 Bottle, Refills: 12    Associated Diagnoses: Allergic rhinitis, unspecified allergic rhinitis type      apixaban (ELIQUIS) 5 mg tablet Take 1 Tab by mouth two (2) times a day.   Qty: 14 Tab, Refills: 0    Associated Diagnoses: Atrial fibrillation, unspecified type ([facility]               Signed:  Chen Lo PA-C  1/14/2017  9:14 AM

## 2017-01-14 NOTE — DISCHARGE INSTRUCTIONS
Pacemaker Placement: What to Expect at [facility] placement is surgery to put a pacemaker in your chest when you have bradycardia (a slow heart rate). A pacemaker is a small, battery-powered device that sends electrical signals to the heart to keep the heartbeat steady. Thin wires, called leads, carry the signals from the pacemaker to the heart. A pacemaker can prevent or reduce dizziness, fainting, and shortness of breath caused by a slow or unsteady heartbeat. Your chest may be sore where the doctor made the cut (incision) and put in the pacemaker. You also may have a bruise and mild swelling. These symptoms usually get better in 1 to 2 weeks. You may feel a hard ridge along the incision. This usually gets softer in the months after surgery. You may be able to see or feel the outline of the pacemaker under your skin. You will probably be able to go back to work or your usual routine 1 to 2 weeks after surgery. Pacemaker batteries usually last 5 to 15 years. Your doctor will talk to you about how often you will need to have your pacemaker checked. You can safely use most household and office electronics such as kitchen appliances, electric power tools, and computers. You will need to stay away from things with strong magnetic and electrical fields such as an MRI machine (unless your pacemaker is safe for an MRI), welding equipment, and power generators. You can use a cell phone, but keep it at least 6 inches away from your pacemaker. Check with your doctor about what you need to stay away from, what you need to use with care, and what is okay to use. This care sheet gives you a general idea about how long it will take for you to recover. But each person recovers at a different pace. Follow the steps below to get better as quickly as possible. How can you care for yourself at home? Activity  · Rest when you feel tired. · Be active. Walking is a good choice.   · For 4 to 6 weeks:  ¨ Avoid activities that strain your chest or upper arm muscles. This includes pushing a  or vacuum, mopping floors, swimming, or swinging a golf club or tennis racquet. ¨ Do not raise your arm (the one on the side of your body where the pacemaker is located) above your shoulder. ¨ Allow your body to heal. Don't move quickly or lift anything heavy until you are feeling better. · Many people are able to return to work within 1 to 2 weeks after surgery. · Ask your doctor when it is okay for you to have sex. Diet  · You can eat your normal diet. If your stomach is upset, try bland, low-fat foods like plain rice, broiled chicken, toast, and yogurt. Medicines  · Your doctor will tell you if and when you can restart your medicines. He or she will also give you instructions about taking any new medicines. · If you take aspirin or some other blood thinner, be sure to talk to your doctor. He or she will tell you if and when to start taking this medicine again. Make sure that you understand exactly what your doctor wants you to do. · Be safe with medicines. Read and follow all instructions on the label. ¨ If the doctor gave you a prescription medicine for pain, take it as prescribed. ¨ If you are not taking a prescription pain medicine, ask your doctor if you can take an over-the-counter medicine. ¨ Do not take aspirin, ibuprofen (Advil, Motrin), naproxen (Aleve), or other nonsteroidal anti-inflammatory drugs (NSAIDs) unless your doctor says it is okay. · If your doctor prescribed antibiotics, take them as directed. Do not stop taking them just because you feel better. You need to take the full course of antibiotics. Incision care  · If you have strips of tape on the incision, leave the tape on for a week or until it falls off. · Keep the incision dry while it heals. Your doctor may recommend sponge baths for about 7 days, but do not get the incision wet.  Your doctor will let you know when you may take showers. After a shower, pat the incision dry. · Don't use hydrogen peroxide or alcohol on the incision, which can slow healing. You may cover the area with a gauze bandage if it oozes fluid or rubs against clothing. Change the bandage every day. · Do not take a bath or get into a hot tub for the first 2 weeks, or until your doctor tells you it is okay. Other instructions  · Keep a medical ID card with you at all times that says you have a pacemaker. The card should include the  and model information. · Wear medical alert jewelry stating that you have a pacemaker. You can buy this at most [employer]. · Check your pulse as directed by your doctor. · Have your pacemaker checked as often as your doctor recommends. In some cases, this may be done over the phone or the Internet. Your doctor will give you instructions about how to do this. Follow-up care is a key part of your treatment and safety. Be sure to make and go to all appointments, and call your doctor if you are having problems. It's also a good idea to know your test results and keep a list of the medicines you take. When should you call for help? Call 911 anytime you think you may need emergency care. For example, call if:  · You passed out (lost consciousness). · You have severe trouble breathing. · You have sudden chest pain and shortness of breath, or you cough up blood. · You have symptoms of a heart attack. These may include:  ¨ Chest pain or pressure, or a strange feeling in the chest.  ¨ Sweating. ¨ Shortness of breath. ¨ Nausea or vomiting. ¨ Pain, pressure, or a strange feeling in the back, neck, jaw, or upper belly or in one or both shoulders or arms. ¨ Lightheadedness or sudden weakness. ¨ A fast or irregular heartbeat. After you call 911, the  may tell you to chew 1 adult-strength or 2 to 4 low-dose aspirin. Wait for an ambulance. Do not try to drive yourself. · You have symptoms of a stroke.  These may include:  ¨ Sudden numbness, tingling, weakness, or loss of movement in your face, arm, or leg, especially on only one side of your body. ¨ Sudden vision changes. ¨ Sudden trouble speaking. ¨ Sudden confusion or trouble understanding simple statements. ¨ Sudden problems with walking or balance. ¨ A sudden, severe headache that is different from past headaches. Call your doctor now or seek immediate medical care if:  · Your heartbeat feels very fast or slow, skips beats, or flutters. · You are dizzy or lightheaded, or you feel like you may faint. · You have new or increased shortness of breath. · You have pain that does not get better after you take pain medicine. · You have loose stitches, or your incision comes open. · Bright red blood has soaked through the bandage over your incision. · You have signs of infection, such as:  ¨ Increased pain, swelling, warmth, or redness. ¨ Red streaks leading from the incision. ¨ Pus draining from the incision. ¨ A fever. · You have signs of a blood clot, such as:  ¨ Pain in your calf, back of the knee, thigh, or groin. ¨ Redness and swelling in your leg or groin. Watch closely for changes in your health, and be sure to contact your doctor if:  · You have any problems with your pacemaker. Where can you learn more? Go to http://james-jannet.info/. Enter G510 in the search box to learn more about \"Pacemaker Placement: What to Expect at Home. \"  Current as of: August 4, 2016  Content Version: 11.1  © 2757-0014 [employer]. Care instructions adapted under license by [employer] ( [employer] ) (which disclaims liability or warranty for this information). If you have questions about a medical condition or this instruction, always ask your healthcare professional. [place] [address] any warranty or liability for your use of this information. CARDIAC DEVICE INSTRUCTION SHEET    1.    You should have received a 1-2 weeks follow up appointment upon discharge from the hospital.  If you did not receive this appointment prior to leaving the hospital, please contact us at [phone]. 2.  Keep your incision dry for 14 days. DO NOT put ointments, creams or lotions on the incision for 2 weeks. 3.  Your dressing will be removed at your follow up appointment. If you have sutures/staples, the nurse will remove them at the follow up appointment. 4.  Call us IMMEDIATELY if you develop fever, pain, redness, or drainage at the incision site. 5.  You may use your pacemaker/ICD arm, but keep your arm lower than your shoulder for the first 8 weeks (or until cleared by a physician) to prevent the device lead(s) from moving. 6.  Do not lift more than 10 pounds for 4 weeks and 20 pounds for 8 weeks. 7.  Within 6 weeks you should receive your cardiac device ID card. Carry your card with you at all times. Always show it to any doctor or dentist you see. 8.    You will need to have your device evaluated every 3 months via office, remote, or telephone. 9.  Microwaves WILL NOT harm your device. Warnings do not apply to you. 10.  At airports, always show your cardiac device ID card. You may walk through metal detectors but do not allow the hand held wand near your device. 11.  DO NOT have an MRI without contacting your cardiologists office first.     12.  Please refer to the education booklet given to you at the hospital for the activities and equipment you should avoid. Some may reprogram or interfere with your cardiac device. DISCHARGE SUMMARY from Nurse    The following personal items are in your possession at time of discharge:    Dental Appliances: Uppers, With patient        Home Medications: Sent to pharmacy  Jewelry: None  Clothing:  At bedside  Other Valuables: None             PATIENT INSTRUCTIONS:    After general anesthesia or intravenous sedation, for 24 hours or while taking prescription Narcotics:  · Limit your activities  · Do not drive and operate hazardous machinery  · Do not make important personal or business decisions  · Do  not drink alcoholic beverages  · If you have not urinated within 8 hours after discharge, please contact your surgeon on call. Report the following to your surgeon:  · Excessive pain, swelling, redness or odor of or around the surgical area  · Temperature over 100.5  · Nausea and vomiting lasting longer than 4 hours or if unable to take medications  · Any signs of decreased circulation or nerve impairment to extremity: change in color, persistent  numbness, tingling, coldness or increase pain  · Any questions        *  Please give a list of your current medications to your Primary Care Provider. *  Please update this list whenever your medications are discontinued, doses are      changed, or new medications (including over-the-counter products) are added. *  Please carry medication information at all times in case of emergency situations. These are general instructions for a healthy lifestyle:    No smoking/ No tobacco products/ Avoid exposure to second hand smoke    Surgeon General's Warning:  Quitting smoking now greatly reduces serious risk to your health. Obesity, smoking, and sedentary lifestyle greatly increases your risk for illness    A healthy diet, regular physical exercise & weight monitoring are important for maintaining a healthy lifestyle    You may be retaining fluid if you have a history of heart failure or if you experience any of the following symptoms:  Weight gain of 3 pounds or more overnight or 5 pounds in a week, increased swelling in our hands or feet or shortness of breath while lying flat in bed. Please call your doctor as soon as you notice any of these symptoms; do not wait until your next office visit.     Recognize signs and symptoms of STROKE:    F-face looks uneven    A-arms unable to move or move unevenly    S-speech slurred or non-existent    T-time-call 911 as soon as signs and symptoms begin-DO NOT go       Back to bed or wait to see if you get better-TIME IS BRAIN. Warning Signs of HEART ATTACK     Call 911 if you have these symptoms:   Chest discomfort. Most heart attacks involve discomfort in the center of the chest that lasts more than a few minutes, or that goes away and comes back. It can feel like uncomfortable pressure, squeezing, fullness, or pain.  Discomfort in other areas of the upper body. Symptoms can include pain or discomfort in one or both arms, the back, neck, jaw, or stomach.  Shortness of breath with or without chest discomfort.  Other signs may include breaking out in a cold sweat, nausea, or lightheadedness. Don't wait more than five minutes to call 911 - MINUTES MATTER! Fast action can save your life. Calling 911 is almost always the fastest way to get lifesaving treatment. Emergency Medical Services staff can begin treatment when they arrive -- up to an hour sooner than if someone gets to the hospital by car. The discharge information has been reviewed with the patient. The patient verbalized understanding. Discharge medications reviewed with the patient and appropriate educational materials and side effects teaching were provided.

## 2017-01-14 NOTE — PROGRESS NOTES
Patient arrived to floor. Heart monitor placed and patient VS taken. Patient educated on bedrest and using sling throughout night. 2nd RN, Keon Dalton RN assessed pacer incision site.

## 2017-01-14 NOTE — PROGRESS NOTES
Skin assessment completed. Patient has left subclavian pacer incision site. Dressing WDL and intact. Patient sacrum is dry and intact. Old scar down patients lower sacrum to top of buttocks. Patient heels are dry and intact.

## 2017-01-14 NOTE — PROGRESS NOTES
Pt admitted as Observation status. Pt instructed on home medication policy; pt voices understanding. Pt provided copies of the following: Admission pamphlet with Observation insert and Medicare FAQ's. Home meds ordered per MD, verified with [facility] and supplied by patient. Home medications include narcotics. Medications verified and labeled per pharmacy and placed in locked box in patient's room. Home narcotics counted and verified with patient, 2 RN's and pharmacy; home narcotics locked up at nursing station with Narcotic Inventory Record completed. The medications received from the patient include Pravastatin, metformin,meloxicam, glimepirdie, effexor, and restroil and ativan. The following medications were not ordered include eliquis and farxiga and were sent home with family.

## 2017-01-14 NOTE — PROGRESS NOTES
Peripheral iv and cardiac monitor removed. Patient controlled substances returned to patient, home medications returned to patient. Awaiting discharge instructions.

## 2017-01-16 LAB
ATRIAL RATE: 70 BPM
CALCULATED P AXIS, ECG09: 46 DEGREES
CALCULATED R AXIS, ECG10: 53 DEGREES
CALCULATED T AXIS, ECG11: 49 DEGREES
DIAGNOSIS, 93000: NORMAL
DIASTOLIC BP, ECG02: NORMAL MMHG
P-R INTERVAL, ECG05: 168 MS
Q-T INTERVAL, ECG07: 378 MS
QRS DURATION, ECG06: 78 MS
QTC CALCULATION (BEZET), ECG08: 408 MS
SYSTOLIC BP, ECG01: NORMAL MMHG
VENTRICULAR RATE, ECG03: 70 BPM

## 2017-06-06 PROBLEM — I48.0 PAROXYSMAL ATRIAL FIBRILLATION (HCC): Chronic | Status: ACTIVE | Noted: 2017-06-06

## 2017-08-07 ENCOUNTER — HOSPITAL ENCOUNTER (OUTPATIENT)
Dept: MAMMOGRAPHY | Age: 72
Discharge: HOME | End: 2017-08-07
Attending: FAMILY MEDICINE
Payer: MEDICARE

## 2017-08-07 DIAGNOSIS — Z78.0 POSTMENOPAUSAL: ICD-10-CM

## 2017-08-07 DIAGNOSIS — Z12.39 BREAST CANCER SCREENING: ICD-10-CM

## 2017-08-07 PROCEDURE — 76642 ULTRASOUND BREAST LIMITED: CPT

## 2017-08-07 PROCEDURE — 77080 DXA BONE DENSITY AXIAL: CPT

## 2017-08-16 NOTE — PROGRESS NOTES
Please tell the patient that the ultrasound was negative for any evidence of breast cancer. Also her bone density shows evidence of osteopenia but not osteoporosis.   Make sure that she has taken vitamin D with calcium 2 a day

## 2017-09-27 ENCOUNTER — APPOINTMENT (OUTPATIENT)
Dept: GENERAL RADIOLOGY | Age: 72
End: 2017-09-27
Payer: MEDICARE

## 2017-09-27 ENCOUNTER — HOSPITAL ENCOUNTER (EMERGENCY)
Age: 72
Discharge: HOME | End: 2017-09-27
Attending: EMERGENCY MEDICINE
Payer: MEDICARE

## 2017-09-27 VITALS
HEIGHT: 70 IN | SYSTOLIC BLOOD PRESSURE: 148 MMHG | BODY MASS INDEX: 36.36 KG/M2 | RESPIRATION RATE: 20 BRPM | OXYGEN SATURATION: 96 % | HEART RATE: 70 BPM | TEMPERATURE: 98 F | DIASTOLIC BLOOD PRESSURE: 84 MMHG | WEIGHT: 254 LBS

## 2017-09-27 PROCEDURE — 73060 X-RAY EXAM OF HUMERUS: CPT

## 2017-09-27 PROCEDURE — 99284 EMERGENCY DEPT VISIT MOD MDM: CPT | Performed by: EMERGENCY MEDICINE

## 2017-09-27 PROCEDURE — 74011250637 HC RX REV CODE- 250/637: Performed by: EMERGENCY MEDICINE

## 2017-09-27 PROCEDURE — 73090 X-RAY EXAM OF FOREARM: CPT

## 2017-09-27 PROCEDURE — 73030 X-RAY EXAM OF SHOULDER: CPT

## 2017-09-27 RX ORDER — OXYCODONE HYDROCHLORIDE 5 MG/1
5 TABLET ORAL
Status: COMPLETED | OUTPATIENT
Start: 2017-09-27 | End: 2017-09-27

## 2017-09-27 RX ORDER — POLYETHYLENE GLYCOL 3350 17 G/17G
17 POWDER, FOR SOLUTION ORAL DAILY
Qty: 505 G | Refills: 0 | Status: ON HOLD | OUTPATIENT
Start: 2017-09-27 | End: 2017-10-05

## 2017-09-27 RX ORDER — HYDROCODONE BITARTRATE AND ACETAMINOPHEN 5; 325 MG/1; MG/1
1 TABLET ORAL
Qty: 17 TAB | Refills: 0 | Status: SHIPPED | OUTPATIENT
Start: 2017-09-27 | End: 2017-10-06

## 2017-09-27 RX ORDER — ONDANSETRON 4 MG/1
4 TABLET, ORALLY DISINTEGRATING ORAL
Qty: 11 TAB | Refills: 1 | Status: SHIPPED | OUTPATIENT
Start: 2017-09-27 | End: 2017-12-06

## 2017-09-27 RX ORDER — ONDANSETRON 8 MG/1
8 TABLET, ORALLY DISINTEGRATING ORAL
Status: COMPLETED | OUTPATIENT
Start: 2017-09-27 | End: 2017-09-27

## 2017-09-27 RX ADMIN — ONDANSETRON 8 MG: 8 TABLET, ORALLY DISINTEGRATING ORAL at 11:34

## 2017-09-27 RX ADMIN — OXYCODONE HYDROCHLORIDE 5 MG: 5 TABLET ORAL at 11:34

## 2017-09-27 NOTE — Clinical Note
Wear sling, ice shoulder, Motrin 400 mg every 6 hours Call [facility] orthopedics today for an appointment early next week. If you have new or concerning problems please call or come back to the emergency room for evaluation.

## 2017-09-27 NOTE — ED PROVIDER NOTES
CDNotes Templates                              Emergency Department     Chief Complaint:  fall  HPI:  70-year-old female wwas on her porch. Tripped and fell. Landed on her left arm. In her knees. Complains of left shoulder pain. No loss of consciousness. No head injury. Patient injured: left shoulder  Context of fall: trip  The fall occurred this morning  Associated symptoms include extremity injury  Severity of pain is moderate  Historian:   Patient, family  Review of Systems:  Include pertinent positives and negatives. 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  bs: . hypo at 40-60, non recent    DM type 2 (diabetes mellitus, type 2) ([address] 4/17/2013    [facility]     Essential hypertension     Essential hypertension 10/21/2016    GERD (gastroesophageal reflux disease) 4/17/2013    chronic    GERD (gastroesophageal reflux disease) 4/17/2013    History of non-Hodgkin's lymphoma 12 yrs ago    remission    history of PUD (peptic ulcer disease) 4/17/2013    history of PUD (peptic ulcer disease) 4/17/2013    History of pulmonary embolus (PE) 20+ yrs    had a fx    History of tobacco abuse 46 yrs    quit 1 yr ago    Hyperlipidemia 4/17/2013    Insomnia 4/17/2013    Insomnia 4/17/2013    Obesity (BMI 30-39.9) 33.8    Pacemaker     Paroxysmal atrial fibrillation ([facility] 6/6/2017    Psychiatric disorder     [facility]     Thyroid disease     partial thyroidectomy     Past Surgical History:   Procedure Laterality Date    HX APPENDECTOMY      HX BREAST AUGMENTATION  40 yrs ago    rupture with removal and replacement    HX CATARACT REMOVAL  2010    bilateral    HX COLECTOMY      HX FRACTURE TX      right arm with pinning and hardware removal    HX PACEMAKER      HX PARTIAL THYROIDECTOMY  5/00    \"rosibel\"    HX KENYA AND BSO      NEUROLOGICAL PROCEDURE UNLISTED      back surgery     Social History   Substance Use Topics    Smoking status: Former Smoker     Packs/day: 1.00     Years: 52.00     Quit date: 7/27/2012    Smokeless tobacco: Never Used    Alcohol use No     Family History   Problem Relation Age of Onset    Cancer Mother      colon/stomach    Dementia Father      alzheimers    Cancer Sister      breast    Liver Disease Maternal Uncle      Previous Medications    APIXABAN (ELIQUIS) 5 MG TABLET    Take 1 Tab by mouth two (2) times a day. CEPHALEXIN (KEFLEX) 500 MG CAPSULE    Take 1 Cap by mouth three (3) times daily. CLONAZEPAM (KLONOPIN) 2 MG TABLET    Take 1 Tab by mouth nightly. Max Daily Amount: 2 mg. FLUTICASONE (FLONASE) 50 MCG/ACTUATION NASAL SPRAY    2 Sprays by Both Nostrils route daily. GLIMEPIRIDE (AMARYL) 4 MG TABLET    Take 1 Tab by mouth two (2) times a day. INSULIN DEGLUDEC (TRESIBA FLEXTOUCH U-100) 100 UNIT/ML (3 ML) INPN    10 Units by SubCUTAneous route daily. INSULIN NEEDLES, DISPOSABLE, (RELION PEN NEEDLES) 32 GAUGE X 5/32\" NDLE    Use daily with insulin    MELOXICAM (MOBIC) 15 MG TABLET    Take 1 Tab by mouth daily. METFORMIN (GLUCOPHAGE) 1,000 MG TABLET    Take 1 Tab by mouth two (2) times daily (with meals). METOPROLOL TARTRATE (LOPRESSOR) 50 MG TABLET    Take 1 Tab by mouth two (2) times a day.     OTHER    Rollator  (R26.81) Gait instability  (primary encounter diagnosis)    PRAVASTATIN (PRAVACHOL) 40 MG TABLET    Take 2 Tabs by mouth nightly. TEMAZEPAM (RESTORIL) 30 MG CAPSULE    Take 1 Cap by mouth nightly as needed for Sleep. Max Daily Amount: 30 mg. VENLAFAXINE-SR (EFFEXOR-XR) 75 MG CAPSULE    Take 1 Cap by mouth daily. Allergies as of 09/27/2017 - Review Complete 09/27/2017   Allergen Reaction Noted    Aspirin Nausea and Vomiting 04/17/2013         Physical Exam:    Vital signs:   Visit Vitals    BP (!) 197/96 (BP 1 Location: Right arm, BP Patient Position: At rest)    Pulse 70    Temp 98.1 °F (36.7 °C)    Resp 17    Ht 5' 10\" (1.778 m)    Wt 115.2 kg (254 lb)    SpO2 (!) 89%    BMI 36.45 kg/m2       Vital signs were reviewed. General Appear: Well-appearing nontoxic female  Head:    No scalp contusions or injuries  ENT:   pharynx clear, because membranes moist  Eyes:    PERRL, EOMI  Neck:   Full range of motion without bony tenderness  Cardiovascular: regular rate and rhythm, no murmurs  Respiratory:  clear to auscultation bilaterally, no wheezes, rales, ronchi  Chest:   non-tender, no deformity  Abdomen:  soft, non-tender, non-distended  Musculoskeletal: Left shoulder tender. Range of motion limited. Left upper arm elbow forearm wrist and hand are unremarkable. The bilateral knees are swollen, large contusions overlying the knee caps. Range of motion is intact. Skin:   dry, no rash, intact  Neurologic:  alert and oriented, moves all 4 extremities  _______________________________________________________________________    Radiology studies performed:    XR FOREARM LT AP/LAT   Final Result   IMPRESSION: Negative left forearm. XR HUMERUS LT   Final Result   IMPRESSION: Nondisplaced fractures proximal left humerus. XR SHOULDER LT AP/LAT MIN 2 V    (Results Pending)       _____________________________________________________________________  Progress:    X-rays reviewed. Discussed with patient and family. Refer to orthopedic.   She is seen POA in the past.    ______________________________________________________________________  Condition:  fair  Disposition:  home  Diagnosis:  Left proximal humerus fracture, fall, bilateral knee contusions      Gaby Brody M.D.      Katerina Carrasco; version 2.0; revised April, 2016.

## 2017-09-27 NOTE — DISCHARGE INSTRUCTIONS
Humerus Fracture: Care Instructions  Your Care Instructions    Your humerus is a bone in your upper arm. It extends from your shoulder to your elbow, and it is the largest bone in your arm. This bone may break (fracture) during sports, a fall, or other accidents. It may happen when your arm or shoulder is hit or used to protect you in a fall. Fractures can range from a small, hairline crack to a bone or bones broken into two or more pieces. Your treatment depends on how bad the break is. Your doctor may have put your arm in a cast, splint, or sling to allow it to heal or to keep it stable until you see another doctor. It may take weeks or months for your arm to heal. You can help your arm heal with some care at home. You heal best when you take good care of yourself. Eat a variety of healthy foods, and don't smoke. Follow-up care is a key part of your treatment and safety. Be sure to make and go to all appointments, and call your doctor if you are having problems. It's also a good idea to know your test results and keep a list of the medicines you take. How can you care for yourself at home? · Put ice or a cold pack on your arm for 10 to 20 minutes at a time. Try to do this every 1 to 2 hours for the next 3 days (when you are awake). Put a thin cloth between the ice and your cast or splint. Keep the cast or splint dry. If you do not have a splint or cast, use a cloth between the ice and your skin. · Follow the care instructions your doctor gives you. If you have a sling, do not take it off unless your doctor tells you to. · Be safe with medicines. Take pain medicines exactly as directed. ¨ If the doctor gave you a prescription medicine for pain, take it as prescribed. ¨ If you are not taking a prescription pain medicine, ask your doctor if you can take an over-the-counter medicine. · Your doctor may advise you to keep your arm next to your body.  It may help to use a pillow to support your elbow while sitting. · Follow instructions for moving your arm and doing exercises to keep your arm strong. · Wiggle your fingers and wrist often to reduce swelling and stiffness. When should you call for help? Call your doctor now or seek immediate medical care if:  · You have increased or severe pain in your arm. · Your hand is cool or pale or changes color. · You have tingling, weakness, or numbness in your hand or fingers. · Your cast or splint feels too tight. · You cannot move your fingers. · The skin under your cast or splint is burning or stinging. Watch closely for changes in your health, and be sure to contact your doctor if:  · You do not get better as expected. Where can you learn more? Go to http://james-jannet.info/. Enter O695 in the search box to learn more about \"Humerus Fracture: Care Instructions. \"  Current as of: March 21, 2017  Content Version: 11.3  © 3820-6258 [employer]. Care instructions adapted under license by [employer] (which disclaims liability or warranty for this information). If you have questions about a medical condition or this instruction, always ask your healthcare professional. [place] [address] any warranty or liability for your use of this information. Xr Humerus Lt    Result Date: 9/27/2017  Two views left humerus INDICATION: pain FINDINGS: Nondisplaced fracture through the humeral neck. Additional nondisplaced fracture greater tuberosity. IMPRESSION: Nondisplaced fractures proximal left humerus. Xr Forearm Lt Ap/lat    Result Date: 9/27/2017  Two views left forearm INDICATION: pain FINDINGS: Ulna and radius intact without fracture. No soft tissue abnormality. Limited assessment of the elbow and wrist unremarkable. IMPRESSION: Negative left forearm.

## 2017-09-27 NOTE — ED TRIAGE NOTES
Brought in via EMS. States pt fell from standing this morning. Skin tears noted to right arm. Pt c/o pain to left upper arm. No obvious deformity noted. Denies any LOC or hitting head. Pt recieved 50 Fentanyl and 4 Zofran IV en route. Pt is alert and oriented x 4. Respirations are even and unlabored. Pt appears in no acute distress at this time. Pt is on Eliquis.

## 2017-09-27 NOTE — ED NOTES
I have reviewed discharge instructions with the patient and spouse. The patient and spouse verbalized understanding. Patient left ED via Discharge Method: wheelchair to Home with daughter and . Opportunity for questions and clarification provided. Patient given 3 scripts.

## 2017-09-28 ENCOUNTER — PATIENT OUTREACH (OUTPATIENT)
Dept: CASE MANAGEMENT | Age: 72
End: 2017-09-28

## 2017-09-28 NOTE — PROGRESS NOTES
This note will not be viewable in [address] Transition of Care Discharge Follow-up Questionnaire   Date/Time of Call:   September 28, 2017 1:14PM   What was the patient hospitalized for? Patient seen in ED on 09/27/2017 for Fall             Does the patient understand his/her diagnosis and/or treatment and what happened during the hospitalization? Patient states understanding of diagnosis and treatment during hospitalization. Did the patient receive discharge instructions? Yes     Review any discharge instructions (see notes in [facility]). Ask patient if they understand these. Do they have any questions? Patient states understanding of discharge instructions, patient states no questions. Were home services ordered (nursing, PT, OT, ST, etc.)? No home health services ordered. If so, has the first visit occurred? If not, why? (Assist with coordination of services if necessary. ) NA         Was any DME ordered? No durable medical equipment ordered. If so, has it been received? If not, why?  (Assist with coordination of arranging DME orders if necessary. ) NA         Complete a review of all medications (new, continued and discontinued meds per the D/C instructions and medication tab in [facility]). Care Coordinator reviewed all medications with patient per [facility], three new medications. HYDROcodone-acetaminophen (NORCO) 5-325 mg per tablet EVERY 6 HOURS AS NEEDED    ondansetron (ZOFRAN ODT) 4 mg disintegrating tablet EVERY 8 HOURS AS NEEDED         polyethylene glycol (MIRALAX) 17 gram/dose powder DAILY                     Were all new prescriptions filled? If not, why?  (Assist with obtainment of medications if necessary.) Yes, patient states prescriptions filled and currently being taken per doctor's order. Does the patient understand the purpose and dosing instructions for all medications?   (If patient has questions, provide explanation and education.) Patient states understanding of current medications and dosing instructions. Does the patient have any problems in performing ADLs? (If patient is unable to perform ADLs  what is the limiting factor(s)? Do they have a support system that can assist? If no support system is present, discuss possible assistance that they may be able to obtain.) Patient states she requires assistance with ADL's and ambulation. Patient states she doing okay despite having pain when she moves or walk. Patient states she is managing pain with po medication. Patient states her daughter is with her and provides assistance around the clock. Does the patient have all follow-up appointments scheduled? 7 day f/up with PCP?    7-14 day f/up with specialist?    If f/up has not been made  what actions has the care coordinator made to accomplish this? Has transportation been arranged? [facility] follow-up should be within 7 days of discharge; all others should have PCP follow-up within 7 days of discharge; follow-ups with other specialists should be within 7-14 days of discharge.) Yes      NA    [facility], October 3, 2017 @ 11:00AM    NA      Yes      NA   Any other questions or concerns expressed by the patient? No further needs identified, patient instructed to call Care Coordinator if further questions or concerns arise. Schedule next appointment with BARI Fajardo or refer to RN Case Manager/  per the workflow guidelines. When is care coordinators next follow-up call scheduled? If referred for CCM  what RN care manager was the referral assigned?  NA          NA        NA   HUMBERTO Call Completed By: SHUKRI Kearney ACO  Care Coordinator

## 2017-10-01 ENCOUNTER — APPOINTMENT (OUTPATIENT)
Dept: GENERAL RADIOLOGY | Age: 72
DRG: 190 | End: 2017-10-01
Attending: EMERGENCY MEDICINE
Payer: MEDICARE

## 2017-10-01 ENCOUNTER — HOSPITAL ENCOUNTER (INPATIENT)
Age: 72
LOS: 3 days | Discharge: NURSING, REHAB OR LONG-TERM CARE | DRG: 190 | End: 2017-10-06
Attending: EMERGENCY MEDICINE | Admitting: INTERNAL MEDICINE
Payer: MEDICARE

## 2017-10-01 DIAGNOSIS — R44.3 HALLUCINATIONS: ICD-10-CM

## 2017-10-01 DIAGNOSIS — E11.9 TYPE 2 DIABETES MELLITUS WITHOUT COMPLICATION, WITHOUT LONG-TERM CURRENT USE OF INSULIN (HCC): ICD-10-CM

## 2017-10-01 DIAGNOSIS — S42.202D CLOSED FRACTURE OF PROXIMAL END OF LEFT HUMERUS WITH ROUTINE HEALING, UNSPECIFIED FRACTURE MORPHOLOGY, SUBSEQUENT ENCOUNTER: ICD-10-CM

## 2017-10-01 DIAGNOSIS — R09.02 HYPOXIA: Primary | ICD-10-CM

## 2017-10-01 LAB
BASOPHILS # BLD: 0.1 K/UL (ref 0–0.2)
BASOPHILS NFR BLD: 0 % (ref 0–2)
DIFFERENTIAL METHOD BLD: ABNORMAL
EOSINOPHIL # BLD: 0.1 K/UL (ref 0–0.8)
EOSINOPHIL NFR BLD: 1 % (ref 0.5–7.8)
ERYTHROCYTE [DISTWIDTH] IN BLOOD BY AUTOMATED COUNT: 14 % (ref 11.9–14.6)
HCT VFR BLD AUTO: 35.2 % (ref 35.8–46.3)
HGB BLD-MCNC: 11.1 G/DL (ref 11.7–15.4)
IMM GRANULOCYTES # BLD: 0 K/UL (ref 0–0.5)
IMM GRANULOCYTES NFR BLD: 0.4 % (ref 0–5)
LYMPHOCYTES # BLD: 1.5 K/UL (ref 0.5–4.6)
LYMPHOCYTES NFR BLD: 13 % (ref 13–44)
MCH RBC QN AUTO: 27.7 PG (ref 26.1–32.9)
MCHC RBC AUTO-ENTMCNC: 31.5 G/DL (ref 31.4–35)
MCV RBC AUTO: 87.8 FL (ref 79.6–97.8)
MONOCYTES # BLD: 1.3 K/UL (ref 0.1–1.3)
MONOCYTES NFR BLD: 11 % (ref 4–12)
NEUTS SEG # BLD: 8.2 K/UL (ref 1.7–8.2)
NEUTS SEG NFR BLD: 75 % (ref 43–78)
PLATELET # BLD AUTO: 233 K/UL (ref 150–450)
PMV BLD AUTO: 11.1 FL (ref 10.8–14.1)
RBC # BLD AUTO: 4.01 M/UL (ref 4.05–5.25)
WBC # BLD AUTO: 11.2 K/UL (ref 4.3–11.1)

## 2017-10-01 PROCEDURE — 80076 HEPATIC FUNCTION PANEL: CPT | Performed by: EMERGENCY MEDICINE

## 2017-10-01 PROCEDURE — 71020 XR CHEST PA LAT: CPT

## 2017-10-01 PROCEDURE — 80048 BASIC METABOLIC PNL TOTAL CA: CPT | Performed by: EMERGENCY MEDICINE

## 2017-10-01 PROCEDURE — 99284 EMERGENCY DEPT VISIT MOD MDM: CPT | Performed by: EMERGENCY MEDICINE

## 2017-10-01 PROCEDURE — 81003 URINALYSIS AUTO W/O SCOPE: CPT | Performed by: EMERGENCY MEDICINE

## 2017-10-01 PROCEDURE — 85025 COMPLETE CBC W/AUTO DIFF WBC: CPT | Performed by: EMERGENCY MEDICINE

## 2017-10-01 RX ORDER — SODIUM CHLORIDE 0.9 % (FLUSH) 0.9 %
5-10 SYRINGE (ML) INJECTION EVERY 8 HOURS
Status: DISCONTINUED | OUTPATIENT
Start: 2017-10-01 | End: 2017-10-02 | Stop reason: SDUPTHER

## 2017-10-01 RX ORDER — SODIUM CHLORIDE 0.9 % (FLUSH) 0.9 %
5-10 SYRINGE (ML) INJECTION AS NEEDED
Status: DISCONTINUED | OUTPATIENT
Start: 2017-10-01 | End: 2017-10-02 | Stop reason: SDUPTHER

## 2017-10-01 RX ADMIN — Medication 10 ML: at 23:08

## 2017-10-02 ENCOUNTER — HOME HEALTH ADMISSION (OUTPATIENT)
Dept: HOME HEALTH SERVICES | Facility: HOME HEALTH | Age: 72
End: 2017-10-02

## 2017-10-02 PROBLEM — E66.01 MORBID OBESITY (HCC): Chronic | Status: ACTIVE | Noted: 2017-10-02

## 2017-10-02 PROBLEM — S42.302A CLOSED FRACTURE OF SHAFT OF LEFT HUMERUS: Chronic | Status: ACTIVE | Noted: 2017-10-02

## 2017-10-02 PROBLEM — R00.1 BRADYCARDIA: Status: RESOLVED | Noted: 2017-01-12 | Resolved: 2017-10-02

## 2017-10-02 PROBLEM — R09.02 HYPOXIA: Status: ACTIVE | Noted: 2017-10-02

## 2017-10-02 LAB
ALBUMIN SERPL-MCNC: 3.2 G/DL (ref 3.2–4.6)
ALBUMIN/GLOB SERPL: 0.7 {RATIO} (ref 1.2–3.5)
ALP SERPL-CCNC: 60 U/L (ref 50–136)
ALT SERPL-CCNC: 15 U/L (ref 12–65)
ANION GAP SERPL CALC-SCNC: 12 MMOL/L (ref 7–16)
ANION GAP SERPL CALC-SCNC: 9 MMOL/L (ref 7–16)
ARTERIAL PATENCY WRIST A: POSITIVE
AST SERPL-CCNC: 12 U/L (ref 15–37)
BACTERIA URNS QL MICRO: ABNORMAL /HPF
BASE EXCESS BLDA CALC-SCNC: 2.6 MMOL/L (ref 0–3)
BASOPHILS # BLD: 0 K/UL (ref 0–0.2)
BASOPHILS NFR BLD: 0 % (ref 0–2)
BDY SITE: ABNORMAL
BILIRUB DIRECT SERPL-MCNC: 0.2 MG/DL
BILIRUB SERPL-MCNC: 0.7 MG/DL (ref 0.2–1.1)
BUN SERPL-MCNC: 22 MG/DL (ref 8–23)
BUN SERPL-MCNC: 25 MG/DL (ref 8–23)
CALCIUM SERPL-MCNC: 8.9 MG/DL (ref 8.3–10.4)
CALCIUM SERPL-MCNC: 9 MG/DL (ref 8.3–10.4)
CASTS URNS QL MICRO: 0 /LPF
CHLORIDE SERPL-SCNC: 96 MMOL/L (ref 98–107)
CHLORIDE SERPL-SCNC: 98 MMOL/L (ref 98–107)
CO2 SERPL-SCNC: 26 MMOL/L (ref 21–32)
CO2 SERPL-SCNC: 30 MMOL/L (ref 21–32)
COHGB MFR BLD: 0.5 % (ref 0.5–1.5)
CREAT SERPL-MCNC: 1.08 MG/DL (ref 0.6–1)
CREAT SERPL-MCNC: 1.18 MG/DL (ref 0.6–1)
CRYSTALS URNS QL MICRO: 0 /LPF
DIFFERENTIAL METHOD BLD: ABNORMAL
DO-HGB BLD-MCNC: 13 % (ref 0–5)
EOSINOPHIL # BLD: 0.1 K/UL (ref 0–0.8)
EOSINOPHIL NFR BLD: 1 % (ref 0.5–7.8)
EPI CELLS #/AREA URNS HPF: ABNORMAL /HPF
ERYTHROCYTE [DISTWIDTH] IN BLOOD BY AUTOMATED COUNT: 14.1 % (ref 11.9–14.6)
FIO2 ON VENT: 21 %
GLOBULIN SER CALC-MCNC: 4.5 G/DL (ref 2.3–3.5)
GLUCOSE BLD STRIP.AUTO-MCNC: 152 MG/DL (ref 65–100)
GLUCOSE BLD STRIP.AUTO-MCNC: 219 MG/DL (ref 65–100)
GLUCOSE BLD STRIP.AUTO-MCNC: 241 MG/DL (ref 65–100)
GLUCOSE BLD STRIP.AUTO-MCNC: 360 MG/DL (ref 65–100)
GLUCOSE SERPL-MCNC: 245 MG/DL (ref 65–100)
GLUCOSE SERPL-MCNC: 267 MG/DL (ref 65–100)
HCO3 BLDA-SCNC: 28 MMOL/L (ref 22–26)
HCT VFR BLD AUTO: 34.7 % (ref 35.8–46.3)
HGB BLD-MCNC: 10.8 G/DL (ref 11.7–15.4)
HGB BLDMV-MCNC: 11.6 GM/DL (ref 11.7–15)
IMM GRANULOCYTES # BLD: 0 K/UL (ref 0–0.5)
IMM GRANULOCYTES NFR BLD: 0.4 % (ref 0–5)
LYMPHOCYTES # BLD: 1.6 K/UL (ref 0.5–4.6)
LYMPHOCYTES NFR BLD: 17 % (ref 13–44)
MCH RBC QN AUTO: 27.3 PG (ref 26.1–32.9)
MCHC RBC AUTO-ENTMCNC: 31.1 G/DL (ref 31.4–35)
MCV RBC AUTO: 87.6 FL (ref 79.6–97.8)
METHGB MFR BLD: 0.1 % (ref 0–1.5)
MONOCYTES # BLD: 1.1 K/UL (ref 0.1–1.3)
MONOCYTES NFR BLD: 12 % (ref 4–12)
MUCOUS THREADS URNS QL MICRO: 0 /LPF
NEUTS SEG # BLD: 6.5 K/UL (ref 1.7–8.2)
NEUTS SEG NFR BLD: 70 % (ref 43–78)
OTHER OBSERVATIONS,UCOM: ABNORMAL
OXYHGB MFR BLDA: 86.9 % (ref 94–97)
PCO2 BLDA: 46 MMHG (ref 35–45)
PH BLDA: 7.4 [PH] (ref 7.35–7.45)
PLATELET # BLD AUTO: 204 K/UL (ref 150–450)
PMV BLD AUTO: 11.3 FL (ref 10.8–14.1)
PO2 BLDA: 54 MMHG (ref 80–105)
POTASSIUM SERPL-SCNC: 4.2 MMOL/L (ref 3.5–5.1)
POTASSIUM SERPL-SCNC: 4.2 MMOL/L (ref 3.5–5.1)
PROT SERPL-MCNC: 7.7 G/DL (ref 6.3–8.2)
RBC # BLD AUTO: 3.96 M/UL (ref 4.05–5.25)
RBC #/AREA URNS HPF: ABNORMAL /HPF
SAO2 % BLD: 87 % (ref 92–98.5)
SERVICE CMNT-IMP: ABNORMAL
SODIUM SERPL-SCNC: 135 MMOL/L (ref 136–145)
SODIUM SERPL-SCNC: 136 MMOL/L (ref 136–145)
VENTILATION MODE VENT: ABNORMAL
WBC # BLD AUTO: 9.3 K/UL (ref 4.3–11.1)
WBC URNS QL MICRO: ABNORMAL /HPF

## 2017-10-02 PROCEDURE — 94640 AIRWAY INHALATION TREATMENT: CPT

## 2017-10-02 PROCEDURE — 36415 COLL VENOUS BLD VENIPUNCTURE: CPT | Performed by: FAMILY MEDICINE

## 2017-10-02 PROCEDURE — 81015 MICROSCOPIC EXAM OF URINE: CPT | Performed by: EMERGENCY MEDICINE

## 2017-10-02 PROCEDURE — 74011000250 HC RX REV CODE- 250: Performed by: FAMILY MEDICINE

## 2017-10-02 PROCEDURE — 94760 N-INVAS EAR/PLS OXIMETRY 1: CPT

## 2017-10-02 PROCEDURE — 74011636637 HC RX REV CODE- 636/637: Performed by: FAMILY MEDICINE

## 2017-10-02 PROCEDURE — 74011250637 HC RX REV CODE- 250/637: Performed by: FAMILY MEDICINE

## 2017-10-02 PROCEDURE — 99218 HC RM OBSERVATION: CPT

## 2017-10-02 PROCEDURE — 74011250636 HC RX REV CODE- 250/636: Performed by: FAMILY MEDICINE

## 2017-10-02 PROCEDURE — 80048 BASIC METABOLIC PNL TOTAL CA: CPT | Performed by: FAMILY MEDICINE

## 2017-10-02 PROCEDURE — G8979 MOBILITY GOAL STATUS: HCPCS

## 2017-10-02 PROCEDURE — 85025 COMPLETE CBC W/AUTO DIFF WBC: CPT | Performed by: FAMILY MEDICINE

## 2017-10-02 PROCEDURE — 82803 BLOOD GASES ANY COMBINATION: CPT

## 2017-10-02 PROCEDURE — G8978 MOBILITY CURRENT STATUS: HCPCS

## 2017-10-02 PROCEDURE — 82962 GLUCOSE BLOOD TEST: CPT

## 2017-10-02 PROCEDURE — 36600 WITHDRAWAL OF ARTERIAL BLOOD: CPT

## 2017-10-02 PROCEDURE — 97161 PT EVAL LOW COMPLEX 20 MIN: CPT

## 2017-10-02 RX ORDER — PROCHLORPERAZINE EDISYLATE 5 MG/ML
5 INJECTION INTRAMUSCULAR; INTRAVENOUS
Status: DISCONTINUED | OUTPATIENT
Start: 2017-10-02 | End: 2017-10-03

## 2017-10-02 RX ORDER — TEMAZEPAM 15 MG/1
30 CAPSULE ORAL
Status: DISCONTINUED | OUTPATIENT
Start: 2017-10-02 | End: 2017-10-03

## 2017-10-02 RX ORDER — METOPROLOL TARTRATE 50 MG/1
50 TABLET ORAL 2 TIMES DAILY
Status: DISCONTINUED | OUTPATIENT
Start: 2017-10-02 | End: 2017-10-06 | Stop reason: HOSPADM

## 2017-10-02 RX ORDER — POLYETHYLENE GLYCOL 3350 17 G/17G
17 POWDER, FOR SOLUTION ORAL DAILY
Status: DISCONTINUED | OUTPATIENT
Start: 2017-10-02 | End: 2017-10-06 | Stop reason: HOSPADM

## 2017-10-02 RX ORDER — SODIUM CHLORIDE 9 MG/ML
1000 INJECTION, SOLUTION INTRAVENOUS CONTINUOUS
Status: DISPENSED | OUTPATIENT
Start: 2017-10-02 | End: 2017-10-02

## 2017-10-02 RX ORDER — DEXTROSE 50 % IN WATER (D50W) INTRAVENOUS SYRINGE
25-50 AS NEEDED
Status: DISCONTINUED | OUTPATIENT
Start: 2017-10-02 | End: 2017-10-06 | Stop reason: HOSPADM

## 2017-10-02 RX ORDER — SODIUM CHLORIDE 0.9 % (FLUSH) 0.9 %
5-10 SYRINGE (ML) INJECTION EVERY 8 HOURS
Status: DISCONTINUED | OUTPATIENT
Start: 2017-10-02 | End: 2017-10-05

## 2017-10-02 RX ORDER — CLONAZEPAM 0.5 MG/1
2 TABLET ORAL
Status: CANCELLED | OUTPATIENT
Start: 2017-10-02

## 2017-10-02 RX ORDER — IBUPROFEN 200 MG
1 TABLET ORAL EVERY 24 HOURS
Status: DISCONTINUED | OUTPATIENT
Start: 2017-10-02 | End: 2017-10-06

## 2017-10-02 RX ORDER — DEXTROSE 40 %
15 GEL (GRAM) ORAL AS NEEDED
Status: DISCONTINUED | OUTPATIENT
Start: 2017-10-02 | End: 2017-10-06 | Stop reason: HOSPADM

## 2017-10-02 RX ORDER — INSULIN GLARGINE 100 [IU]/ML
10 INJECTION, SOLUTION SUBCUTANEOUS DAILY
Status: DISCONTINUED | OUTPATIENT
Start: 2017-10-02 | End: 2017-10-03

## 2017-10-02 RX ORDER — HALOPERIDOL 5 MG/ML
5 INJECTION INTRAMUSCULAR
Status: DISCONTINUED | OUTPATIENT
Start: 2017-10-02 | End: 2017-10-03

## 2017-10-02 RX ORDER — SODIUM CHLORIDE 0.9 % (FLUSH) 0.9 %
5-10 SYRINGE (ML) INJECTION AS NEEDED
Status: DISCONTINUED | OUTPATIENT
Start: 2017-10-02 | End: 2017-10-06 | Stop reason: HOSPADM

## 2017-10-02 RX ORDER — LORAZEPAM 1 MG/1
1 TABLET ORAL
Status: DISCONTINUED | OUTPATIENT
Start: 2017-10-02 | End: 2017-10-03

## 2017-10-02 RX ORDER — PRAVASTATIN SODIUM 80 MG/1
80 TABLET ORAL
Status: DISCONTINUED | OUTPATIENT
Start: 2017-10-02 | End: 2017-10-06 | Stop reason: HOSPADM

## 2017-10-02 RX ORDER — VENLAFAXINE HYDROCHLORIDE 75 MG/1
75 CAPSULE, EXTENDED RELEASE ORAL DAILY
Status: DISCONTINUED | OUTPATIENT
Start: 2017-10-02 | End: 2017-10-06 | Stop reason: HOSPADM

## 2017-10-02 RX ORDER — OXYCODONE AND ACETAMINOPHEN 7.5; 325 MG/1; MG/1
1 TABLET ORAL
Status: DISCONTINUED | OUTPATIENT
Start: 2017-10-02 | End: 2017-10-03

## 2017-10-02 RX ORDER — BISACODYL 5 MG
5 TABLET, DELAYED RELEASE (ENTERIC COATED) ORAL DAILY PRN
Status: DISCONTINUED | OUTPATIENT
Start: 2017-10-02 | End: 2017-10-06 | Stop reason: HOSPADM

## 2017-10-02 RX ORDER — ALBUTEROL SULFATE 0.83 MG/ML
2.5 SOLUTION RESPIRATORY (INHALATION)
Status: COMPLETED | OUTPATIENT
Start: 2017-10-02 | End: 2017-10-02

## 2017-10-02 RX ORDER — DIPHENHYDRAMINE HCL 25 MG
25 CAPSULE ORAL
Status: DISCONTINUED | OUTPATIENT
Start: 2017-10-02 | End: 2017-10-03

## 2017-10-02 RX ORDER — ACETAMINOPHEN 325 MG/1
650 TABLET ORAL
Status: DISCONTINUED | OUTPATIENT
Start: 2017-10-02 | End: 2017-10-03

## 2017-10-02 RX ADMIN — INSULIN HUMAN 4 UNITS: 100 INJECTION, SOLUTION PARENTERAL at 08:35

## 2017-10-02 RX ADMIN — POLYETHYLENE GLYCOL 3350 17 G: 17 POWDER, FOR SOLUTION ORAL at 09:57

## 2017-10-02 RX ADMIN — ALBUTEROL SULFATE 2.5 MG: 2.5 SOLUTION RESPIRATORY (INHALATION) at 04:03

## 2017-10-02 RX ADMIN — Medication 10 ML: at 14:00

## 2017-10-02 RX ADMIN — METOPROLOL TARTRATE 50 MG: 50 TABLET ORAL at 09:57

## 2017-10-02 RX ADMIN — INSULIN HUMAN 14 UNITS: 100 INJECTION, SOLUTION PARENTERAL at 11:29

## 2017-10-02 RX ADMIN — HALOPERIDOL LACTATE 5 MG: 5 INJECTION, SOLUTION INTRAMUSCULAR at 21:15

## 2017-10-02 RX ADMIN — SODIUM CHLORIDE 1000 ML: 900 INJECTION, SOLUTION INTRAVENOUS at 04:00

## 2017-10-02 RX ADMIN — APIXABAN 5 MG: 2.5 TABLET, FILM COATED ORAL at 21:15

## 2017-10-02 RX ADMIN — INSULIN GLARGINE 10 UNITS: 100 INJECTION, SOLUTION SUBCUTANEOUS at 08:32

## 2017-10-02 RX ADMIN — APIXABAN 5 MG: 2.5 TABLET, FILM COATED ORAL at 09:57

## 2017-10-02 RX ADMIN — INSULIN HUMAN 4 UNITS: 100 INJECTION, SOLUTION PARENTERAL at 17:11

## 2017-10-02 RX ADMIN — VENLAFAXINE HYDROCHLORIDE 75 MG: 75 CAPSULE, EXTENDED RELEASE ORAL at 09:56

## 2017-10-02 RX ADMIN — INSULIN HUMAN 2 UNITS: 100 INJECTION, SOLUTION PARENTERAL at 21:15

## 2017-10-02 RX ADMIN — PRAVASTATIN SODIUM 80 MG: 80 TABLET ORAL at 21:15

## 2017-10-02 RX ADMIN — METOPROLOL TARTRATE 50 MG: 50 TABLET ORAL at 17:11

## 2017-10-02 RX ADMIN — OXYCODONE HYDROCHLORIDE AND ACETAMINOPHEN 1 TABLET: 7.5; 325 TABLET ORAL at 09:41

## 2017-10-02 RX ADMIN — Medication 10 ML: at 21:26

## 2017-10-02 NOTE — ED PROVIDER NOTES
HPI Comments: 51-year-old white female was seen in the emergency department 4 days ago after a fall at home which resulted in a nondisplaced proximal humerus fracture. She is discharged home in a sling with Lortab and returns due to continued pain. She is having trouble sleeping because of the pain. No vomiting. No fever. Family reports that she seems to be hallucinating. She is reportedly hearing family members that are not in the house. Daughter also reports that her glucose was elevated around 300 today. Patient is a 67 y.o. female presenting with arm pain. The history is provided by the patient. Arm Pain    Pertinent negatives include no back pain and no neck pain. 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  bs: . hypo at 40-60, non recent    DM type 2 (diabetes mellitus, type 2) ([address] 4/17/2013    DVT (deep venous thrombosis) ([address]     Essential hypertension     Essential hypertension 10/21/2016    GERD (gastroesophageal reflux disease) 4/17/2013    chronic    GERD (gastroesophageal reflux disease) 4/17/2013    History of non-Hodgkin's lymphoma 12 yrs ago    remission    history of PUD (peptic ulcer disease) 4/17/2013    history of PUD (peptic ulcer disease) 4/17/2013    History of pulmonary embolus (PE) 20+ yrs    had a fx    History of tobacco abuse 46 yrs    quit 1 yr ago    Hyperlipidemia 4/17/2013    Insomnia 4/17/2013    Insomnia 4/17/2013    Obesity (BMI 30-39.9) 33.8    Pacemaker     Paroxysmal atrial fibrillation ([address] 6/6/2017    Psychiatric disorder     Pulmonary embolism ([address]     Thyroid disease     partial thyroidectomy       Past Surgical History:   Procedure Laterality Date    HX APPENDECTOMY      HX BREAST AUGMENTATION  40 yrs ago    rupture with removal and replacement    HX CATARACT REMOVAL  2010    bilateral    HX COLECTOMY      HX FRACTURE TX      right arm with pinning and hardware removal    HX PACEMAKER      HX PARTIAL THYROIDECTOMY  5/00    \"rosibel\"    HX KENYA AND BSO      NEUROLOGICAL PROCEDURE UNLISTED      back surgery         Family History:   Problem Relation Age of Onset    Cancer Mother      colon/stomach    Dementia Father      alzheimers    Cancer Sister      breast    Liver Disease Maternal Uncle        Social History     Social History    Marital status:      Spouse name: N/A    Number of children: N/A    Years of education: N/A     Occupational History    Not on file. Social History Main Topics    Smoking status: Former Smoker     Packs/day: 1.00     Years: 52.00     Quit date: 7/27/2012    Smokeless tobacco: Never Used    Alcohol use No    Drug use: No    Sexual activity: Not Currently     Other Topics Concern    Not on file     Social History Narrative         ALLERGIES: Aspirin    Review of Systems   Constitutional: Negative for fever. HENT: Negative for congestion. Respiratory: Negative for cough and shortness of breath. Cardiovascular: Negative for chest pain. Gastrointestinal: Negative for abdominal pain, nausea and vomiting. Musculoskeletal: Negative for back pain and neck pain. Skin: Negative for rash. Neurological: Negative for headaches. Vitals:    10/01/17 2226   BP: 155/68   Pulse: 60   Resp: 20   Temp: 98.6 °F (37 °C)   SpO2: 98%   Weight: 114.8 kg (253 lb)   Height: 6' (1.829 m)            Physical Exam   Constitutional: She is oriented to person, place, and time. She appears well-developed and well-nourished. No distress. HENT:   Head: Normocephalic and atraumatic.    Mouth/Throat: Oropharynx is clear and moist.   Eyes: Conjunctivae are normal. Pupils are equal, round, and reactive to light. Neck: Normal range of motion. Neck supple. Cardiovascular: Normal rate and regular rhythm. No murmur heard. Pulmonary/Chest: Effort normal and breath sounds normal. She has no wheezes. Abdominal: Soft. She exhibits no distension. There is no tenderness. Musculoskeletal:   no range of motion to left shoulder secondary to recent fracture. Extensive bruising to left shoulder and upper arm. Both lower extremities have extensive bruising but good range of motion. Neurological: She is alert and oriented to person, place, and time. No cranial nerve deficit. Coordination normal.   Skin: Skin is warm and dry. Psychiatric: She has a normal mood and affect. Nursing note and vitals reviewed. MDM  Number of Diagnoses or Management Options  Diagnosis management comments: Lab work obtained secondary to her hallucinations revealed no acute abnormality. Urinalysis no infection. During ED course, patient was noted to have oxygen saturation in the mid 80s. ABG confirms hypoxia with PaO2 of 54 on room air. Patient placed on supplemental oxygen and O2 saturation immediately improved to high 90s. I did review previous visit vital signs and she was noted to have O2 saturation of 89%. Patient does not have a very good explanation for why she fell the day she broke her arm. Concerned that it may be related to the hypoxia which may be related to underlying COPD as she does have a past history of tobacco use. At this time I do not feel that she is safe for immediate discharge home as she is at high risk of falling again. We'll discuss with hospitalist for admission for further workup for her hypoxia and possible discharge on home O2. Do not suspect PE as she is already on  Anticoagulation due to having underlying atrial fibrillation.        Amount and/or Complexity of Data Reviewed  Clinical lab tests: ordered and reviewed  Tests in the radiology section of CPT®: ordered and reviewed  Tests in the medicine section of CPT®: ordered and reviewed  Discuss the patient with other providers: yes  Independent visualization of images, tracings, or specimens: yes    Risk of Complications, Morbidity, and/or Mortality  Presenting problems: moderate  Diagnostic procedures: moderate  Management options: moderate      ED Course       Procedures

## 2017-10-02 NOTE — PROGRESS NOTES
Spoke with Anat Singletary RT / she states she will come up to do a qualifier on patient for any Home O2 needs.

## 2017-10-02 NOTE — H&P
HOSPITALIST H&P/CONSULT  NAME:  Birdie Richards   Age:  67 y.o.  :   1945   MRN:   [id number]  PCP: Jerry Hackett MD  Treatment Team: Attending Provider: Yuni Conte MD; Primary Nurse: Dorota Roberts RN    No Order     CC: Reason for admission is: Hypoxia    HPI:   Patient history was obtained from the ER provider prior to seeing the patient. Patient is a 67 y.o. female who presents to the ER due to arm pain and AMS with hallucinations per family. She had a fall 4 days ago and suffered a humerus fx. She is to f/u with ortho as outpt. She came in tonight because she states her pain is not controlled. Family reports that she is confused and hearing family members who were not in the house. ER workup showed her to be hypoxic on room air. Pt denies any prior lung diseases. She did smoke for many years (52 pack years), but quit 5 years ago. Her mental status has improved since arrival and being on oxygen. She denies wheezing, but does report a dry cough. ROS:  All systems have been reviewed and are negative except as stated in HPI or elsewhere. 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  bs: . hypo at 40-60, non recent    DM type 2 (diabetes mellitus, type 2) ([address] 2013    DVT (deep venous thrombosis) ([address]     Essential hypertension     Essential hypertension 10/21/2016    GERD (gastroesophageal reflux disease) 2013    chronic    GERD (gastroesophageal reflux disease) 2013    History of non-Hodgkin's lymphoma 12 yrs ago    remission    history of PUD (peptic ulcer disease) 2013    history of PUD (peptic ulcer disease) 4/17/2013    History of pulmonary embolus (PE) 20+ yrs    had a fx    History of tobacco abuse 46 yrs    quit 1 yr ago    Hyperlipidemia 4/17/2013    Insomnia 4/17/2013    Insomnia 4/17/2013    Obesity (BMI 30-39.9) 33.8    Pacemaker     [facility]ox[facility] 6/6/2017    Psychiatric disorder     Pulmonary embolism [facility]     Thyroid disease     partial thyroidectomy      Past Surgical History:   Procedure Laterality Date    HX APPENDECTOMY      HX BREAST AUGMENTATION  40 yrs ago    rupture with removal and replacement    HX CATARACT REMOVAL  2010    bilateral    HX COLECTOMY      HX FRACTURE TX      right arm with pinning and hardware removal    HX PACEMAKER      HX PARTIAL THYROIDECTOMY  5/00    \"rosibel\"    HX KENYA AND BSO      NEUROLOGICAL PROCEDURE UNLISTED      back surgery      Social History   Substance Use Topics    Smoking status: Former Smoker     Packs/day: 1.00     Years: 52.00     Quit date: 7/27/2012    Smokeless tobacco: Never Used    Alcohol use No      Family History   Problem Relation Age of Onset    Cancer Mother      colon/stomach    Dementia Father      alzheimers    Cancer Sister      breast    Liver Disease Maternal Uncle        FH Reviewed and non-contributory to admitting diagnosis    Allergies   Allergen Reactions    Aspirin Nausea and Vomiting      Prior to Admission Medications   Prescriptions Last Dose Informant Patient Reported? Taking? HYDROcodone-acetaminophen (NORCO) 5-325 mg per tablet   No No   Sig: Take 1 Tab by mouth every six (6) hours as needed for Pain. Max Daily Amount: 4 Tabs. Insulin Needles, Disposable, (RELION PEN NEEDLES) 32 gauge x 5/32\" ndle   No No   Sig: Use daily with insulin   OTHER   No No   Sig: Rollator  (R26.81) Gait instability  (primary encounter diagnosis)   apixaban (ELIQUIS) 5 mg tablet   No No   Sig: Take 1 Tab by mouth two (2) times a day.    cephALEXin (KEFLEX) 500 mg capsule   No No   Sig: Take 1 Cap by mouth three (3) times daily. clonazePAM (KLONOPIN) 2 mg tablet   No No   Sig: Take 1 Tab by mouth nightly. Max Daily Amount: 2 mg. fluticasone (FLONASE) 50 mcg/actuation nasal spray   No No   Si Sprays by Both Nostrils route daily. Patient taking differently: 2 Sprays by Both Nostrils route daily as needed. glimepiride (AMARYL) 4 mg tablet   No No   Sig: Take 1 Tab by mouth two (2) times a day. insulin degludec (TRESIBA FLEXTOUCH U-100) 100 unit/mL (3 mL) inpn   No No   Sig: 10 Units by SubCUTAneous route daily. meloxicam (MOBIC) 15 mg tablet   No No   Sig: Take 1 Tab by mouth daily. metoprolol tartrate (LOPRESSOR) 50 mg tablet   No No   Sig: Take 1 Tab by mouth two (2) times a day. ondansetron (ZOFRAN ODT) 4 mg disintegrating tablet   No No   Sig: Take 1 Tab by mouth every eight (8) hours as needed for Nausea. polyethylene glycol (MIRALAX) 17 gram/dose powder   No No   Sig: Take 17 g by mouth daily. 1 tablespoon with 8 oz of water daily   pravastatin (PRAVACHOL) 40 mg tablet   No No   Sig: Take 2 Tabs by mouth nightly. temazepam (RESTORIL) 30 mg capsule   No No   Sig: Take 1 Cap by mouth nightly as needed for Sleep. Max Daily Amount: 30 mg.   venlafaxine-SR (EFFEXOR-XR) 75 mg capsule   No No   Sig: Take 1 Cap by mouth daily. Facility-Administered Medications: None         Objective:   Patient Vitals for the past 24 hrs:   Temp Pulse Resp BP SpO2   10/02/17 0133 - - - - 97 %   10/01/17 2226 98.6 °F (37 °C) 60 20 155/68 98 %     No intake or output data in the 24 hours ending 10/02/17 0141   Temp (24hrs), Av.6 °F (37 °C), Min:98.6 °F (37 °C), Max:98.6 °F (37 °C)    Oxygen Therapy  O2 Sat (%): 97 % (10/02/17 0133)  Pulse via Oximetry: 71 beats per minute (10/02/17 0133)  O2 Device: Nasal cannula (10/02/17 0133)  O2 Flow Rate (L/min): 3 l/min (10/02/17 0133)  FIO2 (%): 32 % (10/02/17 0133)   Body mass index is 34.31 kg/(m^2). Physical Exam:    General:    WD and WN, No apparent distress. Head:   Normocephalic, without obvious abnormality, atraumatic. Eyes:  PERRL; EOMI; sclera normal/non-icteric  ENT:  Hearing is normal.  Oropharynx is clear with tacky mucous membranes   Resp:    Very poor air movement bilaterally. No Wheezing or Rhonchi. Resp are even and unlabored  Heart[de-identified]  Regular rate and rhythm,  no murmur,   No LE edema  Abdomen:   Soft, non-tender. Not distended. Bowel sounds normal.  hepato-splenomegaly cannot be assess due to obesity   Musc/SK: Muscle strength is good and tone normal; No cyanosis. No clubbing  Skin:     Texture, turgor normal. No significant rashes or lesions. Neurologic: CN II - XII are grossly intact - other than Eye exam as noted above  Psych: Alert and oriented x 4;  Judgement and insight are normal     Data Review:   Recent Results (from the past 24 hour(s))   METABOLIC PANEL, BASIC    Collection Time: 10/01/17 11:38 PM   Result Value Ref Range    Sodium 135 (L) 136 - 145 mmol/L    Potassium 4.2 3.5 - 5.1 mmol/L    Chloride 96 (L) 98 - 107 mmol/L    CO2 30 21 - 32 mmol/L    Anion gap 9 7 - 16 mmol/L    Glucose 267 (H) 65 - 100 mg/dL    BUN 25 (H) 8 - 23 MG/DL    Creatinine 1.18 (H) 0.6 - 1.0 MG/DL    GFR est AA 58 (L) >60 ml/min/1.73m2    GFR est non-AA 48 (L) >60 ml/min/1.73m2    Calcium 9.0 8.3 - 10.4 MG/DL   CBC WITH AUTOMATED DIFF    Collection Time: 10/01/17 11:38 PM   Result Value Ref Range    WBC 11.2 (H) 4.3 - 11.1 K/uL    RBC 4.01 (L) 4.05 - 5.25 M/uL    HGB 11.1 (L) 11.7 - 15.4 g/dL    HCT 35.2 (L) 35.8 - 46.3 %    MCV 87.8 79.6 - 97.8 FL    MCH 27.7 26.1 - 32.9 PG    MCHC 31.5 31.4 - 35.0 g/dL    RDW 14.0 11.9 - 14.6 %    PLATELET 168 382 - 997 K/uL    MPV 11.1 10.8 - 14.1 FL    DF AUTOMATED      NEUTROPHILS 75 43 - 78 %    LYMPHOCYTES 13 13 - 44 %    MONOCYTES 11 4.0 - 12.0 %    EOSINOPHILS 1 0.5 - 7.8 %    BASOPHILS 0 0.0 - 2.0 %    IMMATURE GRANULOCYTES 0.4 0.0 - 5.0 %    ABS. NEUTROPHILS 8.2 1.7 - 8.2 K/UL    ABS.  LYMPHOCYTES 1.5 0.5 - 4.6 K/UL ABS. MONOCYTES 1.3 0.1 - 1.3 K/UL    ABS. EOSINOPHILS 0.1 0.0 - 0.8 K/UL    ABS. BASOPHILS 0.1 0.0 - 0.2 K/UL    ABS. IMM. GRANS. 0.0 0.0 - 0.5 K/UL   HEPATIC FUNCTION PANEL    Collection Time: 10/01/17 11:38 PM   Result Value Ref Range    Protein, total 7.7 6.3 - 8.2 g/dL    Albumin 3.2 3.2 - 4.6 g/dL    Globulin 4.5 (H) 2.3 - 3.5 g/dL    A-G Ratio 0.7 (L) 1.2 - 3.5      Bilirubin, total 0.7 0.2 - 1.1 MG/DL    Bilirubin, direct 0.2 <0.4 MG/DL    Alk. phosphatase 60 50 - 136 U/L    AST (SGOT) 12 (L) 15 - 37 U/L    ALT (SGPT) 15 12 - 65 U/L   URINE MICROSCOPIC    Collection Time: 10/02/17 12:40 AM   Result Value Ref Range    WBC 3-5 0 /hpf    RBC 0-3 0 /hpf    Epithelial cells 3-5 0 /hpf    Bacteria 1+ (H) 0 /hpf    Casts 0 0 /lpf    Crystals, urine 0 0 /LPF    Mucus 0 0 /lpf    Other observations RESULTS VERIFIED MANUALLY     BLOOD GAS, ARTERIAL    Collection Time: 10/02/17 12:55 AM   Result Value Ref Range    pH 7.40 7.35 - 7.45      PCO2 46 (H) 35 - 45 mmHg    PO2 54 (L) 80 - 105 mmHg    BICARBONATE 28 (H) 22 - 26 mmol/L    BASE EXCESS 2.6 0 - 3 mmol/L    TOTAL HEMOGLOBIN 11.6 (L) 11.7 - 15.0 GM/DL    O2 SAT 87 (L) 92 - 98.5 %    Arterial O2 Hgb 86.9 (L) 94 - 97 %    CARBOXYHEMOGLOBIN 0.5 0.5 - 1.5 %    METHEMOGLOBIN 0.1 0.0 - 1.5 %    DEOXYHEMOGLOBIN 13 (H) 0.0 - 5.0 %    SITE RR     ALLENS TEST POSITIVE      MODE RA     FIO2 21.0 %    Respiratory comment: Dr Ravindra Duong at 10 2 2017 1 12 45 AM. Read back. CXR Results  (Last 48 hours)               10/01/17 9133  XR CHEST PA LAT Final result    Impression:  IMPRESSION: Normal chest.                       Narrative:  EXAM:  XR CHEST PA LAT       INDICATION:   low O2 saturation       COMPARISON: 1/14/2017. FINDINGS: PA and lateral radiographs of the chest demonstrate clear lungs. The   cardiac and mediastinal contours and pulmonary vascularity are normal.  The   bones and soft tissues are within normal limits. Pacemaker lead position.                CT Results  (Last 48 hours)    None              Assessment and Plan: Active Hospital Problems    Diagnosis Date Noted    Hypoxia 10/02/2017    [facility]ox[facility] ([facility] 06/06/2017    Pacemaker 01/13/2017     1. Biotronik MRI Pacemaker - Jan 2017      Essential hypertension 10/21/2016    Tobacco abuse 04/17/2013    Chronic bronchitis ([facility] 04/17/2013    GERD (gastroesophageal reflux disease) 04/17/2013     chronic      DM type 2 (diabetes mellitus, type 2) ([facility] 04/17/2013         · PLAN   · Observation  · I believe she is having worsening of undiagnosed COPD  · Home O2 set up  · F/u outpt with Pulm for PFTs and formal diagnosis  · Cont appropriate home meds (see MAR)  · Control symptoms (pain, n/v, fever, etc)  · Monitor appropriate labs   · DVT prophylaxis:  eliquis  · Code status: Full  · Risk: mod  · Anticipated DC needs: Oxygen and f/u with Pulm  · Estimated LOS:  Less than 2 midnights  · Plans discussed with patient and/or caregiver; questions answered.       Med records reviewed if applicable; findings:     Critical care time if applicable:      Signed By: Juli Griffith MD     October 2, 2017

## 2017-10-02 NOTE — ED TRIAGE NOTES
Pt was here Wednesday dx with a fracture and states that the pain medication are not working pt only has a sling no splint

## 2017-10-02 NOTE — PROGRESS NOTES
Problem: Falls - Risk of  Goal: *Absence of Falls  Document Catrina Fall Risk and appropriate interventions in the flowsheet.    Outcome: Progressing Towards Goal  Fall Risk Interventions:  Mobility Interventions: Bed/chair exit alarm, PT Consult for mobility concerns, Utilize walker, cane, or other assitive device     Mentation Interventions: Bed/chair exit alarm, Door open when patient unattended     Medication Interventions: Bed/chair exit alarm, Patient to call before getting OOB, Teach patient to arise slowly     Elimination Interventions: Call light in reach, Patient to call for help with toileting needs, Elevated toilet seat, Toileting schedule/hourly rounds     History of Falls Interventions: Bed/chair exit alarm, Door open when patient unattended

## 2017-10-02 NOTE — ED NOTES
Pt only has a sling on arm no splint and pt states that her arm is just hurting her and the mediation that we gave her is not helping the pain. Family gets here and states that pt has been having hallucinations and that she went storming out the door tonight stating she was going get herself some help.

## 2017-10-02 NOTE — PROGRESS NOTES
I went to the patient's room for the second time to ambulate but she is still refusing, stating she is unable to walk at this time. She did need 3L earlier when I did a resting O2 qualifier.     See note chart below:     Room air: SpO2 with O2 and liter flow   Resting SpO2  86%  87% on 1L   88% on 2L   92% on 3L   Ambulating SpO2  pt unable  pt states she is unable to ambulate at this time.

## 2017-10-02 NOTE — PROGRESS NOTES
Per mireille Posey to ambulate patient for exertion ambulation (for Home O2). Donny Karimi RT made aware / primary nurse made aware.

## 2017-10-02 NOTE — PROGRESS NOTES
PROGRESS NOTE    Patient seen and examined. Admitted after midnight. Still with confusion - ?dementia vs. Hypoxia  Will need home O2 eval and to get O2 at home. Awaiting therapies to see for further discharge plans.   Can likely discharge tomorrow AM.    Pavan Mckeon, DO

## 2017-10-02 NOTE — PROGRESS NOTES
Met with patient and family in room / all aware that Home O2 ordered (through [address] [place]) / pt and family ok with [facility] (PT/OT/RN/eval for personal aide). Patient lives at home with spouse. Per daughter, patient will have difficulty ambulating with walker r/t arm. Standard wheelchair also ordered through [address] [place]. Family requests patient be sent to via [employer] at discharge.

## 2017-10-02 NOTE — PROGRESS NOTES
Visit with patient to build rapport with . Patient is calm. Receptive to  presence. Encouraged and assured patient of our continued care.     Stuart Ashraf,  Staff   C: [phone]  /  [email or web address]

## 2017-10-02 NOTE — PROGRESS NOTES
Problem: Mobility Impaired (Adult and Pediatric)  Goal: *Acute Goals and Plan of Care (Insert Text)  LTG:  (1.)Ms. Angeli Simons will move from supine to sit and sit to supine , scoot up and down and roll side to side with MODIFIED INDEPENDENCE within 7 day(s). (2.)Ms. Angeli Simons will transfer from bed to chair and chair to bed with SUPERVISION using the least restrictive device within 7 day(s). (3.)Ms. Angeli Simons will ambulate with STAND BY ASSIST for 250 feet with the least restrictive device within 7 day(s), NWB L UE. (4.)Ms. Angeli Simons will perform 4 steps with HR and SBA within 7 days for safety ascending and descending stairs for home. (5.)Ms. Angeli Simons will chivo L UE sling with independence within 7 days for L UE support s/p L humerus fracture. ____________________________________________________________________________________________      PHYSICAL THERAPY: INITIAL ASSESSMENT, AM 10/2/2017  OBSERVATION: Hospital Day: 2  [place] / Product Type: Medicare /       NWB L UE, in sling     NAME/AGE/GENDER: Lindsay Zamorano is a 67 y.o. female              PRIMARY DIAGNOSIS: Hypoxia Hypoxia Hypoxia        ICD-10: Treatment Diagnosis:       · Generalized Muscle Weakness (M62.81)  · Difficulty in walking, Not elsewhere classified (R26.2)  · History of falling (Z91.81)   Precaution/Allergies:  Aspirin       ASSESSMENT:      Ms. Angeli Simons presents with decreased bed mobility, transfers, ambulation, balance, activity tolerance, and overall general functional mobility s/p hospital admission with AMS, hallucinations at home. Pt with fall last week resulting in L nondisplaced humerus fracture, NWB L UE in sling. Pt on 3 L/min O2 this am, confused, oriented to self only. Pt has no family present at this time. Pt relates prior to fall, uses rollator walker for ambulation, independent in ADLs and drives. Not sure how accurate this may be, no family to confirm.  Pt with sling off and laying on bed, PT repositioned sling to L UE, reminded pt of NWB L. Pt states pain in UE 6/10, RN aware. Pt required MIN A for bed mobility, transfers and ambulation from bed to chair in room. Pt noted to have bruising B LEs and L UE. Pt with slow shuffled gait, leaning heavily to R side for support from PT. Next visit pt may benefit from use of rosibel walker for increased support with ambulation. Pt confusion limited patient presently. Pt remains fall risk at this time. Pt with overall limited mobility. New sling requested from materials due to pt current sling does not support elbow and difficult for pt to use. PT to cont to follow for acute care needs to address deficits noted above. Pt presently would benefit from further skilled PT services at this time. This section established at most recent assessment   PROBLEM LIST (Impairments causing functional limitations):  1. Decreased ADL/Functional Activities  2. Decreased Transfer Abilities  3. Decreased Ambulation Ability/Technique  4. Decreased Balance  5. Increased Pain  6. Decreased Activity Tolerance  7. Decreased Flexibility/Joint Mobility  8. Decreased Cognition    INTERVENTIONS PLANNED: (Benefits and precautions of physical therapy have been discussed with the patient.)  1. Balance Exercise  2. Bed Mobility  3. Family Education  4. Gait Training  5. Home Exercise Program (HEP)  6. Therapeutic Activites  7. Therapeutic Exercise/Strengthening  8. Transfer Training  9. Group Therapy      TREATMENT PLAN: Frequency/Duration: 3 times a week for duration of hospital stay  Rehabilitation Potential For Stated Goals: GOOD      RECOMMENDED REHABILITATION/EQUIPMENT: (at time of discharge pending progress): Due to the probability of continued deficits (see above) this patient will likely need continued skilled physical therapy after discharge.   Equipment:   · TBD                   HISTORY:   History of Present Injury/Illness (Reason for Referral):  Patient history was obtained from the ER provider prior to seeing the patient. Patient is a 67 y.o. female who presents to the ER due to arm pain and AMS with hallucinations per family. She had a fall 4 days ago and suffered a humerus fx. She is to f/u with ortho as outpt. She came in tonight because she states her pain is not controlled. Family reports that she is confused and hearing family members who were not in the house. ER workup showed her to be hypoxic on room air. Pt denies any prior lung diseases. She did smoke for many years (52 pack years), but quit 5 years ago. Her mental status has improved since arrival and being on oxygen. She denies wheezing, but does report a dry cough. Past Medical History/Comorbidities:   Ms. 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); DM type 2 (diabetes mellitus, type 2) ([address] ( 6 yrs); DM type 2 (diabetes mellitus, type 2) ([address] (4/17/2013); DVT (deep venous thrombosis) ([address] Essential hypertension; Essential hypertension (10/21/2016); GERD (gastroesophageal reflux disease) (4/17/2013); GERD (gastroesophageal reflux disease) (4/17/2013); History of non-Hodgkin's lymphoma (12 yrs ago); history of PUD (peptic ulcer disease) (4/17/2013); history of PUD (peptic ulcer disease) (4/17/2013); History of pulmonary embolus (PE) (20+ yrs); History of tobacco abuse (52 yrs); Hyperlipidemia (4/17/2013); Insomnia (4/17/2013); Insomnia (4/17/2013); Non Hodgkin's lymphoma ([address] (4/17/2013); Obesity (BMI 30-39.9) (33.8); Pacemaker; Paroxysmal atrial fibrillation ([address] (6/6/2017); Psychiatric disorder; Pulmonary embolism ([facility] and Thyroid disease. She also has no past medical history of Asthma; Autoimmune disease ([facility] CAD (coronary artery disease);  Chronic kidney disease; Chronic obstructive pulmonary disease ([facility] DEMENTIA; Difficult intubation; Heart failure ([facility] Infectious disease; Liver disease; Malignant hyperthermia due to anesthesia; Nausea & vomiting; Neurological disorder; Pseudocholinesterase deficiency; Seizures ([facility] Sleep apnea; Stroke [facility]); or Unspecified adverse effect of anesthesia. Ms. Afshin Painter  has a past surgical history that includes syed and bso; appendectomy; colectomy; partial thyroidectomy (5/00); cataract removal (2010); fracture tx; breast augmentation (40 yrs ago); neurological procedure unlisted; and pacemaker. Social History/Living Environment:   Home Environment: Private residence  # Steps to Enter: 4  Rails to Enter: Yes  One/Two Story Residence: One story  Living Alone: No  Support Systems: Child(tato), Family member(s), Spouse/Significant Other/Partner  Patient Expects to be Discharged to[de-identified] Private residence  Current DME Used/Available at Home: Walker, rollator  Tub or Shower Type: Tub/Shower combination  Prior Level of Function/Work/Activity:  Lives with spouse per pt; independent with ADLs and driving per pt prior to fall; uses rollator walker  Personal Factors:          Sex:  female        Age:  67 y.o.         Overall Behavior:  Agreeable to PT assessment, confused   Number of Personal Factors/Comorbidities that affect the Plan of Care:  Fall with humerus fracture L UE, confusion, DM 3+: HIGH COMPLEXITY   EXAMINATION:   Most Recent Physical Functioning:   Gross Assessment:  AROM: Generally decreased, functional (L UE NT, in sling; all other WDL)  Strength: Generally decreased, functional (B LE grossly 4/5)  Coordination: Generally decreased, functional  Sensation: Intact (to light touch, pt with confusion)               Posture:  Posture (WDL): Exceptions to WDL  Posture Assessment: Rounded shoulders  Balance:  Sitting: Impaired  Sitting - Static: Good (unsupported)  Sitting - Dynamic: Fair (occasional)  Standing: Impaired  Standing - Static: Fair  Standing - Dynamic : Fair Bed Mobility:  Supine to Sit: Minimum assistance  Sit to Supine:  (left up in chair)  Scooting: Minimum assistance; Moderate assistance (to EOB, mainly due to decreased command following)  Wheelchair Mobility:     Transfers:  Sit to Stand: Contact guard assistance;Minimum assistance (bed elevated)  Stand to Sit: Contact guard assistance  Bed to Chair: Minimum assistance  Gait:     Base of Support: Widened  Speed/Viv: Shuffled; Slow  Step Length: Left shortened;Right shortened  Swing Pattern: Left symmetrical;Right symmetrical  Gait Abnormalities: Decreased step clearance;Shuffling gait;Trunk sway increased  Distance (ft): 5 Feet (ft) (from bed to chair)  Assistive Device: Gait belt (HHA and close contact)  Ambulation - Level of Assistance: Contact guard assistance;Minimal assistance  Interventions: Safety awareness training;Verbal cues       Body Structures Involved:  1. Muscles Body Functions Affected:  1. Movement Related Activities and Participation Affected:  1. General Tasks and Demands  2. Mobility  3. Self Care   Number of elements that affect the Plan of Care: 4+: HIGH COMPLEXITY   CLINICAL PRESENTATION:   Presentation: Evolving clinical presentation with changing clinical characteristics: MODERATE COMPLEXITY   CLINICAL DECISION MAKIN [facility] Inpatient Short Form  How much difficulty does the patient currently have. .. Unable A Lot A Little None   1. Turning over in bed (including adjusting bedclothes, sheets and blankets)? [ ] 1   [ ] 2   [X] 3   [ ] 4   2. Sitting down on and standing up from a chair with arms ( e.g., wheelchair, bedside commode, etc.)   [ ] 1   [ ] 2   [X] 3   [ ] 4   3. Moving from lying on back to sitting on the side of the bed? [ ] 1   [ ] 2   [X] 3   [ ] 4   How much help from another person does the patient currently need. .. Total A Lot A Little None   4.   Moving to and from a bed to a chair (including a wheelchair)? [ ] 1   [ ] 2   [X] 3   [ ] 4   5. Need to walk in hospital room? [ ] 1   [ ] 2   [X] 3   [ ] 4   6. Climbing 3-5 steps with a railing? [ ] 1   [ ] 2   [X] 3   [ ] 4   © 2007, Trustees of [address] [place] [address] [address] [id number], under license to [employer]. All rights reserved    Score:  Initial: 18 Most Recent: X (Date: -- )     Interpretation of Tool:  Represents activities that are increasingly more difficult (i.e. Bed mobility, Transfers, Gait). Score 24 23 22-20 19-15 14-10 9-7 6       Modifier CH CI CJ CK CL CM CN         · Mobility - Walking and Moving Around:               - CURRENT STATUS:    CK - 40%-59% impaired, limited or restricted               - GOAL STATUS:           CJ - 20%-39% impaired, limited or restricted               - D/C STATUS:                       ---------------To be determined---------------  [place] / Product Type: Medicare /       Medical Necessity:     · Patient is expected to demonstrate progress in strength, range of motion, balance and coordination to decrease assistance required with overall functional mobility, transfers, ambulation. · Patient demonstrates good rehab potential due to higher previous functional level. Reason for Services/Other Comments:  · Patient continues to require present interventions due to patient's inability to perform bed mobility, transfers, ambulation.    Use of outcome tool(s) and clinical judgement create a POC that gives a: Clear prediction of patient's progress: LOW COMPLEXITY                 TREATMENT:   (In addition to Assessment/Re-Assessment sessions the following treatments were rendered)   Pre-treatment Symptoms/Complaints:  \"I really don't know where I am\"  Pain: Initial:   Pain Intensity 1: 6  Pain Location 1: Arm  Pain Orientation 1: Left  Pain Intervention(s) 1: Repositioned  Post Session:  Does not rate, left up in sitting      Assessment/Reassessment only, no treatment provided today     Braces/Orthotics/Lines/Etc:   · sling L UE  · O2 Device: Nasal cannula  Treatment/Session Assessment:    · Response to Treatment:  confusion  · Interdisciplinary Collaboration:  · Physical Therapist  · Registered Nurse  · After treatment position/precautions:  · Up in chair  · Bed alarm/tab alert on  · Bed/Chair-wheels locked  · Bed in low position  · Call light within reach  · RN notified  · Compliance with Program/Exercises: Will assess as treatment progresses. · Recommendations/Intent for next treatment session: \"Next visit will focus on advancements to more challenging activities\".   Total Treatment Duration:  PT Patient Time In/Time Out  Time In: 1039  Time Out: [address] [facility],

## 2017-10-02 NOTE — PROGRESS NOTES
Skin assessment done with Annelise Carreon RN. No areas of pressure noted but deep bruising noted over left shoulder and arm. Left arm in a sling for stability. Both lower extremities deeply bruised with an abrasion on right knee area cleaned and triple antibiotic ointment applied with an allevyn. Abrasion noted on right forearm cleaned triple antibiotic ointment applied with allevyn. Pt is very confused but does try to hide confusion. Family states she gets very frustrated and mad if she is confronted with her confusion. She will reorient but then immediately forgets what she is told. Return demonstration for the nurse call light done repeatedly. Bed alarm on SR up x3. Will continue to monitor.       Isiah Lewis RN

## 2017-10-02 NOTE — PROGRESS NOTES
Oxygen Qualifier       Room air: SpO2 with O2 and liter flow   Resting SpO2  86%  87% on 1L   88% on 2L   92% on 3L   Ambulating SpO2  pt unable  pt states she is unable to ambulate at this time.        Completed by:    Mikala Lindsey RT

## 2017-10-02 NOTE — PHYSICIAN ADVISORY
Letter of Determination:  Outpatient states receiving Observation Services    This case was reviewed, and I concur with Outpatient status receiving observation services. This determination may change depending on further medical information, condition changes of the patient, or treatment requirements.       Jihan Gallardo MD, ALBERTO,   Physician Doug Chan.

## 2017-10-03 ENCOUNTER — APPOINTMENT (OUTPATIENT)
Dept: CT IMAGING | Age: 72
DRG: 190 | End: 2017-10-03
Attending: INTERNAL MEDICINE
Payer: MEDICARE

## 2017-10-03 PROBLEM — G93.41 METABOLIC ENCEPHALOPATHY: Status: ACTIVE | Noted: 2017-10-03

## 2017-10-03 LAB
ANION GAP SERPL CALC-SCNC: 11 MMOL/L (ref 7–16)
BASOPHILS # BLD: 0 K/UL (ref 0–0.2)
BASOPHILS NFR BLD: 0 % (ref 0–2)
BUN SERPL-MCNC: 17 MG/DL (ref 8–23)
CALCIUM SERPL-MCNC: 9.2 MG/DL (ref 8.3–10.4)
CHLORIDE SERPL-SCNC: 98 MMOL/L (ref 98–107)
CO2 SERPL-SCNC: 28 MMOL/L (ref 21–32)
CREAT SERPL-MCNC: 1 MG/DL (ref 0.6–1)
DIFFERENTIAL METHOD BLD: ABNORMAL
EOSINOPHIL # BLD: 0.1 K/UL (ref 0–0.8)
EOSINOPHIL NFR BLD: 1 % (ref 0.5–7.8)
ERYTHROCYTE [DISTWIDTH] IN BLOOD BY AUTOMATED COUNT: 14.1 % (ref 11.9–14.6)
EST. AVERAGE GLUCOSE BLD GHB EST-MCNC: 223 MG/DL
GLUCOSE BLD STRIP.AUTO-MCNC: 215 MG/DL (ref 65–100)
GLUCOSE BLD STRIP.AUTO-MCNC: 273 MG/DL (ref 65–100)
GLUCOSE BLD STRIP.AUTO-MCNC: 281 MG/DL (ref 65–100)
GLUCOSE BLD STRIP.AUTO-MCNC: 318 MG/DL (ref 65–100)
GLUCOSE SERPL-MCNC: 236 MG/DL (ref 65–100)
HBA1C MFR BLD: 9.4 % (ref 4.8–6)
HCT VFR BLD AUTO: 34.4 % (ref 35.8–46.3)
HGB BLD-MCNC: 10.9 G/DL (ref 11.7–15.4)
IMM GRANULOCYTES # BLD: 0 K/UL (ref 0–0.5)
IMM GRANULOCYTES NFR BLD: 0.2 % (ref 0–5)
LYMPHOCYTES # BLD: 1.3 K/UL (ref 0.5–4.6)
LYMPHOCYTES NFR BLD: 13 % (ref 13–44)
MCH RBC QN AUTO: 27.6 PG (ref 26.1–32.9)
MCHC RBC AUTO-ENTMCNC: 31.7 G/DL (ref 31.4–35)
MCV RBC AUTO: 87.1 FL (ref 79.6–97.8)
MONOCYTES # BLD: 0.9 K/UL (ref 0.1–1.3)
MONOCYTES NFR BLD: 9 % (ref 4–12)
NEUTS SEG # BLD: 7.3 K/UL (ref 1.7–8.2)
NEUTS SEG NFR BLD: 77 % (ref 43–78)
PLATELET # BLD AUTO: 242 K/UL (ref 150–450)
PMV BLD AUTO: 10.5 FL (ref 10.8–14.1)
POTASSIUM SERPL-SCNC: 4.2 MMOL/L (ref 3.5–5.1)
RBC # BLD AUTO: 3.95 M/UL (ref 4.05–5.25)
SODIUM SERPL-SCNC: 137 MMOL/L (ref 136–145)
WBC # BLD AUTO: 9.6 K/UL (ref 4.3–11.1)

## 2017-10-03 PROCEDURE — 65270000029 HC RM PRIVATE

## 2017-10-03 PROCEDURE — 74011250637 HC RX REV CODE- 250/637: Performed by: INTERNAL MEDICINE

## 2017-10-03 PROCEDURE — 74011636637 HC RX REV CODE- 636/637: Performed by: FAMILY MEDICINE

## 2017-10-03 PROCEDURE — 82962 GLUCOSE BLOOD TEST: CPT

## 2017-10-03 PROCEDURE — 97530 THERAPEUTIC ACTIVITIES: CPT

## 2017-10-03 PROCEDURE — 74011636637 HC RX REV CODE- 636/637: Performed by: INTERNAL MEDICINE

## 2017-10-03 PROCEDURE — 74011250636 HC RX REV CODE- 250/636: Performed by: FAMILY MEDICINE

## 2017-10-03 PROCEDURE — 94760 N-INVAS EAR/PLS OXIMETRY 1: CPT

## 2017-10-03 PROCEDURE — 36415 COLL VENOUS BLD VENIPUNCTURE: CPT | Performed by: FAMILY MEDICINE

## 2017-10-03 PROCEDURE — 85025 COMPLETE CBC W/AUTO DIFF WBC: CPT | Performed by: FAMILY MEDICINE

## 2017-10-03 PROCEDURE — 74011000302 HC RX REV CODE- 302: Performed by: INTERNAL MEDICINE

## 2017-10-03 PROCEDURE — 86580 TB INTRADERMAL TEST: CPT | Performed by: INTERNAL MEDICINE

## 2017-10-03 PROCEDURE — 74011250637 HC RX REV CODE- 250/637: Performed by: FAMILY MEDICINE

## 2017-10-03 PROCEDURE — 80048 BASIC METABOLIC PNL TOTAL CA: CPT | Performed by: FAMILY MEDICINE

## 2017-10-03 PROCEDURE — 99218 HC RM OBSERVATION: CPT

## 2017-10-03 PROCEDURE — 70450 CT HEAD/BRAIN W/O DYE: CPT

## 2017-10-03 PROCEDURE — 77010033678 HC OXYGEN DAILY

## 2017-10-03 PROCEDURE — 83036 HEMOGLOBIN GLYCOSYLATED A1C: CPT | Performed by: FAMILY MEDICINE

## 2017-10-03 RX ORDER — INSULIN GLARGINE 100 [IU]/ML
15 INJECTION, SOLUTION SUBCUTANEOUS DAILY
Status: DISCONTINUED | OUTPATIENT
Start: 2017-10-04 | End: 2017-10-06 | Stop reason: HOSPADM

## 2017-10-03 RX ORDER — AMLODIPINE BESYLATE 5 MG/1
5 TABLET ORAL DAILY
Status: DISCONTINUED | OUTPATIENT
Start: 2017-10-03 | End: 2017-10-03

## 2017-10-03 RX ORDER — ACETAMINOPHEN 325 MG/1
650 TABLET ORAL
Status: DISCONTINUED | OUTPATIENT
Start: 2017-10-03 | End: 2017-10-06 | Stop reason: HOSPADM

## 2017-10-03 RX ORDER — TRAMADOL HYDROCHLORIDE 50 MG/1
50 TABLET ORAL
Status: DISCONTINUED | OUTPATIENT
Start: 2017-10-03 | End: 2017-10-06 | Stop reason: HOSPADM

## 2017-10-03 RX ORDER — INSULIN LISPRO 100 [IU]/ML
INJECTION, SOLUTION INTRAVENOUS; SUBCUTANEOUS
Status: DISCONTINUED | OUTPATIENT
Start: 2017-10-03 | End: 2017-10-06 | Stop reason: HOSPADM

## 2017-10-03 RX ORDER — AMLODIPINE BESYLATE 5 MG/1
2.5 TABLET ORAL
Status: DISCONTINUED | OUTPATIENT
Start: 2017-10-03 | End: 2017-10-05

## 2017-10-03 RX ORDER — HALOPERIDOL 5 MG/ML
2 INJECTION INTRAMUSCULAR
Status: DISCONTINUED | OUTPATIENT
Start: 2017-10-03 | End: 2017-10-06 | Stop reason: HOSPADM

## 2017-10-03 RX ORDER — ONDANSETRON 2 MG/ML
4 INJECTION INTRAMUSCULAR; INTRAVENOUS
Status: DISCONTINUED | OUTPATIENT
Start: 2017-10-03 | End: 2017-10-06 | Stop reason: HOSPADM

## 2017-10-03 RX ORDER — CLONAZEPAM 1 MG/1
0.5 TABLET ORAL
Status: DISCONTINUED | OUTPATIENT
Start: 2017-10-03 | End: 2017-10-06 | Stop reason: HOSPADM

## 2017-10-03 RX ADMIN — AMLODIPINE BESYLATE 2.5 MG: 5 TABLET ORAL at 21:20

## 2017-10-03 RX ADMIN — TRAMADOL HYDROCHLORIDE 50 MG: 50 TABLET, FILM COATED ORAL at 19:04

## 2017-10-03 RX ADMIN — INSULIN HUMAN 8 UNITS: 100 INJECTION, SOLUTION PARENTERAL at 12:26

## 2017-10-03 RX ADMIN — INSULIN HUMAN 4 UNITS: 100 INJECTION, SOLUTION PARENTERAL at 09:01

## 2017-10-03 RX ADMIN — PRAVASTATIN SODIUM 80 MG: 80 TABLET ORAL at 21:19

## 2017-10-03 RX ADMIN — METOPROLOL TARTRATE 50 MG: 50 TABLET ORAL at 18:00

## 2017-10-03 RX ADMIN — INSULIN LISPRO 6 UNITS: 100 INJECTION, SOLUTION INTRAVENOUS; SUBCUTANEOUS at 18:05

## 2017-10-03 RX ADMIN — VENLAFAXINE HYDROCHLORIDE 75 MG: 75 CAPSULE, EXTENDED RELEASE ORAL at 08:59

## 2017-10-03 RX ADMIN — Medication 10 ML: at 12:29

## 2017-10-03 RX ADMIN — POLYETHYLENE GLYCOL 3350 17 G: 17 POWDER, FOR SOLUTION ORAL at 09:01

## 2017-10-03 RX ADMIN — APIXABAN 5 MG: 2.5 TABLET, FILM COATED ORAL at 20:51

## 2017-10-03 RX ADMIN — INSULIN GLARGINE 10 UNITS: 100 INJECTION, SOLUTION SUBCUTANEOUS at 09:00

## 2017-10-03 RX ADMIN — APIXABAN 5 MG: 2.5 TABLET, FILM COATED ORAL at 08:59

## 2017-10-03 RX ADMIN — HALOPERIDOL LACTATE 5 MG: 5 INJECTION, SOLUTION INTRAMUSCULAR at 03:30

## 2017-10-03 RX ADMIN — INSULIN LISPRO 6 UNITS: 100 INJECTION, SOLUTION INTRAVENOUS; SUBCUTANEOUS at 21:24

## 2017-10-03 RX ADMIN — TUBERCULIN PURIFIED PROTEIN DERIVATIVE 5 UNITS: 5 INJECTION, SOLUTION INTRADERMAL at 12:27

## 2017-10-03 RX ADMIN — METOPROLOL TARTRATE 50 MG: 50 TABLET ORAL at 08:59

## 2017-10-03 NOTE — PROGRESS NOTES
Met with patient and patient's  in room. Discussed rehab / all in agreement. Gave list to patient's .  feels that [facility] will be their first choice but will also discuss with daughter. Will follow up tomorrow about choices and start referral process for STR.

## 2017-10-03 NOTE — PROGRESS NOTES
Dr. Josefa Ceja paged for patient due to combativeness and trying to constantly get out of bed and yelling. Dr. Jane Martinez ordered 5mg IM of Haldol every 6 hours. No other orders given.

## 2017-10-03 NOTE — PROGRESS NOTES
Ms. Nestor Dolan was apparently combative over night and received haldol at 330 am.  She is currently up in the chair but she is not awake enough to participate in PT. Able to wake her and then she goes right back to sleep. Will try back this afternoon.     Araseli Munson, PT

## 2017-10-03 NOTE — PHYSICIAN ADVISORY
Letter of Determination: Upgrade from Observation to Inpatient Status    This patient was originally hospitalized as observation status on 10/2/2017 for hypoxia. This patient now meets for Inpatient Admission in accordance with CMS regulation Section 43 .3. Specifically, patient's stay is now expected to be over Two Midnights and was medically necessary. After discussion with the attending physician, the patient's stay was medically necessary based on failure of initial observation therapy, continued hypoxemia to an oxygen saturation of 86% on room air, and altered mental status requiring treatment with intramuscular haldol 2mg, which the physician felt may be due to narcotic pain medications. Consistent with CMS guidelines, patient meets for inpatient status. It is our recommendation that this patient's hospitalization status should be upgraded from OBSERVATION to INPATIENT status.      The final decision regarding the patient's hospitalization status depends on the attending physician's judgement.     Bobby Whatley MD, ALBERTO,   Physician Doug Chan.

## 2017-10-03 NOTE — PROGRESS NOTES
MD placed patient inpatient status. Spoke with patient's  (S.B. [phone])  / he states he will be coming up to the hospital some time after lunch today. Per , patient's daughter Chichi Dorman [phone]) at work. Called and left Madison DUDLEY

## 2017-10-03 NOTE — PROGRESS NOTES
Problem: Mobility Impaired (Adult and Pediatric)  Goal: *Acute Goals and Plan of Care (Insert Text)  LTG:  (1.)Ms. Yared Swan will move from supine to sit and sit to supine , scoot up and down and roll side to side with MODIFIED INDEPENDENCE within 7 day(s). (2.)Ms. Yared Swan will transfer from bed to chair and chair to bed with SUPERVISION using the least restrictive device within 7 day(s). (3.)Ms. Yared Swan will ambulate with STAND BY ASSIST for 250 feet with the least restrictive device within 7 day(s), NWB L UE. (4.)Ms. Yared Swan will perform 4 steps with HR and SBA within 7 days for safety ascending and descending stairs for home. (5.)Ms. Yared Swan will chivo L UE sling with independence within 7 days for L UE support s/p L humerus fracture. ____________________________________________________________________________________________      PHYSICAL THERAPY: Daily Note, Treatment Day: 1st and AM 10/3/2017  INPATIENT: Hospital Day: 3  [place] / [place] / Product Type: Medicare /       NWB L UE, in sling     NAME/AGE/GENDER: Erum Agustin is a 67 y.o. female              PRIMARY DIAGNOSIS: Hypoxia  Metabolic encephalopathy Hypoxia Hypoxia        ICD-10: Treatment Diagnosis:       · Generalized Muscle Weakness (M62.81)  · Difficulty in walking, Not elsewhere classified (R26.2)  · History of falling (Z91.81)   Precaution/Allergies:  Aspirin       ASSESSMENT:      Ms. Yared Swan is confused. Thinks she is at the [employer] And when re directed she kept coming back to it. She is more unsteady today. Requiring more assist.  Discussed with Dr. Toshia Avelar who said he wants her to go to rehab. Discussed also with Janie from social work. Tried to use assistive device with her today but she wasn't able to use it effectively. This section established at most recent assessment   PROBLEM LIST (Impairments causing functional limitations):  1.  Decreased ADL/Functional Activities  2. Decreased Transfer Abilities  3. Decreased Ambulation Ability/Technique  4. Decreased Balance  5. Increased Pain  6. Decreased Activity Tolerance  7. Decreased Flexibility/Joint Mobility  8. Decreased Cognition    INTERVENTIONS PLANNED: (Benefits and precautions of physical therapy have been discussed with the patient.)  1. Balance Exercise  2. Bed Mobility  3. Family Education  4. Gait Training  5. Home Exercise Program (HEP)  6. Therapeutic Activites  7. Therapeutic Exercise/Strengthening  8. Transfer Training  9. Group Therapy      TREATMENT PLAN: Frequency/Duration: 3 times a week for duration of hospital stay  Rehabilitation Potential For Stated Goals: GOOD      RECOMMENDED REHABILITATION/EQUIPMENT: (at time of discharge pending progress): Due to the probability of continued deficits (see above) this patient will likely need continued skilled physical therapy after discharge. Equipment:   · TBD                   HISTORY:   History of Present Injury/Illness (Reason for Referral):  Patient history was obtained from the ER provider prior to seeing the patient. Patient is a 67 y.o. female who presents to the ER due to arm pain and AMS with hallucinations per family. She had a fall 4 days ago and suffered a humerus fx. She is to f/u with ortho as outpt. She came in tonight because she states her pain is not controlled. Family reports that she is confused and hearing family members who were not in the house. ER workup showed her to be hypoxic on room air. Pt denies any prior lung diseases. She did smoke for many years (52 pack years), but quit 5 years ago. Her mental status has improved since arrival and being on oxygen. She denies wheezing, but does report a dry cough. Past Medical History/Comorbidities:   Ms. 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); DM type 2 (diabetes mellitus, type 2) ([address] ( 6 yrs); DM type 2 (diabetes mellitus, type 2) ([address] (4/17/2013); DVT (deep venous thrombosis) ([address] Essential hypertension; Essential hypertension (10/21/2016); GERD (gastroesophageal reflux disease) (4/17/2013); GERD (gastroesophageal reflux disease) (4/17/2013); History of non-Hodgkin's lymphoma (12 yrs ago); history of PUD (peptic ulcer disease) (4/17/2013); history of PUD (peptic ulcer disease) (4/17/2013); History of pulmonary embolus (PE) (20+ yrs); History of tobacco abuse (52 yrs); Hyperlipidemia (4/17/2013); Insomnia (4/17/2013); Insomnia (4/17/2013); Metabolic encephalopathy (23/3/6821); Non Hodgkin's lymphoma ([facility] (4/17/2013); Obesity (BMI 30-39.9) (33.8); Pacemaker; Paroxysmal atrial fibrillation ([address] (6/6/2017); Psychiatric disorder; Pulmonary embolism ([address] and Thyroid disease. She also has no past medical history of Asthma; Autoimmune disease ([address] CAD (coronary artery disease); Chronic kidney disease; Chronic obstructive pulmonary disease ([address] DEMENTIA; Difficult intubation; Heart failure ([address] Infectious disease; Liver disease; Malignant hyperthermia due to anesthesia; Nausea & vomiting; Pseudocholinesterase deficiency; Seizures ([address] Sleep apnea; Stroke [facility]); or Unspecified adverse effect of anesthesia. Ms. Poornima Cruz  has a past surgical history that includes syed and bso; appendectomy; colectomy; partial thyroidectomy (5/00); cataract removal (2010); fracture tx; breast augmentation (40 yrs ago); neurological procedure unlisted; and pacemaker.   Social History/Living Environment:   Home Environment: Private residence  # Steps to Enter: 4  Rails to Enter: Yes  One/Two Story Residence: One story  Living Alone: No  Support Systems: Child(tato), Family member(s), Spouse/Significant Other/Partner  Patient Expects to be Discharged to[de-identified] Private residence  Current DME Used/Available at Home: Janey Bloom, rollator  Tub or Shower Type: Tub/Shower combination  Prior Level of Function/Work/Activity:  Lives with spouse per pt; independent with ADLs and driving per pt prior to fall; uses rollator walker  Personal Factors:          Sex:  female        Age:  67 y.o. Overall Behavior:  Agreeable to PT assessment, confused   Number of Personal Factors/Comorbidities that affect the Plan of Care:  Fall with humerus fracture L UE, confusion, DM 3+: HIGH COMPLEXITY   EXAMINATION:   Most Recent Physical Functioning:   Gross Assessment:                  Posture:     Balance:  Sitting: Impaired  Sitting - Static: Fair (occasional)  Sitting - Dynamic: Fair (occasional)  Standing: Impaired  Standing - Static: Fair  Standing - Dynamic : Poor Bed Mobility:     Wheelchair Mobility:     Transfers:  Sit to Stand: Minimum assistance  Stand to Sit: Minimum assistance  Bed to Chair: Minimum assistance  Gait:     Base of Support: Widened  Speed/Viv: Slow;Pace decreased (<100 feet/min)  Step Length: Right shortened;Left shortened  Gait Abnormalities: Path deviations  Distance (ft): 20 Feet (ft)  Assistive Device:  (tried rosibel walker but she doesnt use it properly)  Ambulation - Level of Assistance: Minimal assistance  Interventions: Safety awareness training;Verbal cues; Tactile cues       Body Structures Involved:  1. Muscles Body Functions Affected:  1. Movement Related Activities and Participation Affected:  1. General Tasks and Demands  2. Mobility  3. Self Care   Number of elements that affect the Plan of Care: 4+: HIGH COMPLEXITY   CLINICAL PRESENTATION:   Presentation: Evolving clinical presentation with changing clinical characteristics: MODERATE COMPLEXITY   CLINICAL DECISION MAKIN [address] Mobility Inpatient Short Form  How much difficulty does the patient currently have. .. Unable A Lot A Little None   1.   Turning over in bed (including adjusting bedclothes, sheets and blankets)? [ ] 1   [ ] 2   [X] 3   [ ] 4   2. Sitting down on and standing up from a chair with arms ( e.g., wheelchair, bedside commode, etc.)   [ ] 1   [ ] 2   [X] 3   [ ] 4   3. Moving from lying on back to sitting on the side of the bed? [ ] 1   [ ] 2   [X] 3   [ ] 4   How much help from another person does the patient currently need. .. Total A Lot A Little None   4. Moving to and from a bed to a chair (including a wheelchair)? [ ] 1   [ ] 2   [X] 3   [ ] 4   5. Need to walk in hospital room? [ ] 1   [ ] 2   [X] 3   [ ] 4   6. Climbing 3-5 steps with a railing? [ ] 1   [ ] 2   [X] 3   [ ] 4   © 2007, Trustees of [address] [place] [address], under license to [employer]. All rights reserved    Score:  Initial: 18 Most Recent: X (Date: -- )     Interpretation of Tool:  Represents activities that are increasingly more difficult (i.e. Bed mobility, Transfers, Gait). Score 24 23 22-20 19-15 14-10 9-7 6       Modifier CH CI CJ CK CL CM CN         · Mobility - Walking and Moving Around:               - CURRENT STATUS:    CK - 40%-59% impaired, limited or restricted               - GOAL STATUS:           CJ - 20%-39% impaired, limited or restricted               - D/C STATUS:                       ---------------To be determined---------------  [place] / [place] / Product Type: Medicare /       Medical Necessity:     · Patient is expected to demonstrate progress in strength, range of motion, balance and coordination to decrease assistance required with overall functional mobility, transfers, ambulation. · Patient demonstrates good rehab potential due to higher previous functional level. Reason for Services/Other Comments:  · Patient continues to require present interventions due to patient's inability to perform bed mobility, transfers, ambulation.    Use of outcome tool(s) and clinical judgement create a POC that gives a: Clear prediction of patient's progress: LOW COMPLEXITY                 TREATMENT:   (In addition to Assessment/Re-Assessment sessions the following treatments were rendered)   Pre-treatment Symptoms/Complaints:  \"I really don't know where I am\"  Pain: Initial:   Pain Intensity 1: 0  Post Session:  Does not rate, left up in sitting      Therapeutic Activity: (    15): Therapeutic activities including Bed transfers, Chair transfers, Toilet transfers and Ambulation on level ground to improve mobility. Required moderate Safety awareness training;Verbal cues; Tactile cues to promote motor control of bilateral, upper extremity(s), lower extremity(s). Braces/Orthotics/Lines/Etc:   · sling L UE  · O2 Device: Nasal cannula  Treatment/Session Assessment:    · Response to Treatment:  confusion  · Interdisciplinary Collaboration:  · Physical Therapist  · Registered Nurse  · After treatment position/precautions:  · Up in chair  · Bed alarm/tab alert on  · Bed/Chair-wheels locked  · Bed in low position  · Call light within reach  · RN notified  · Compliance with Program/Exercises: Will assess as treatment progresses. · Recommendations/Intent for next treatment session: \"Next visit will focus on advancements to more challenging activities\".   Total Treatment Duration:PT Patient Time In/Time Out  Time In: 1120  Time Out: 9530 [address],

## 2017-10-03 NOTE — PROGRESS NOTES
Progress Note    Patient: Lindsay Zamorano MRN: [id number]  SSN: [id number]    YOB: 1945  Age: 67 y.o. Sex: female      Admit Date: 10/1/2017    LOS: 0 days     Subjective:     Patient was seen at bedside. Still very confused and unable to cooperate with the physical therapy personnel. I suspect her encephalopathy could be secondary to the use of narcotics. UA NOT suggestive of UTI. Has received parenteral haldol last night. Objective:     Vitals:    10/03/17 0403 10/03/17 0729 10/03/17 1109 10/03/17 1116   BP: 149/61 160/88  139/79   Pulse: 67 73  70   Resp: 20 20  20   Temp: 98.9 °F (37.2 °C) 98.2 °F (36.8 °C)  99.2 °F (37.3 °C)   SpO2: 95% 96% 95% 99%   Weight:       Height:            Intake and Output:  Current Shift: 10/03 0701 - 10/03 1900  In: 174 [P.O.:174]  Out: -   Last three shifts: 10/01 1901 - 10/03 0700  In: 240 [P.O.:240]  Out: 75 [Urine:75]    Physical Exam:   GENERAL: alert, delirious, appears stated age  EYE: conjunctivae/corneas clear. PERRL, EOM's intact. Fundi benign  LYMPHATIC: Cervical, supraclavicular, and axillary nodes normal.   THROAT & NECK: normal and no erythema or exudates noted. LUNG: clear to auscultation bilaterally  HEART: regular rate and rhythm, S1, S2 normal, no murmur, click, rub or gallop  ABDOMEN: soft, non-tender. Bowel sounds normal. No masses,  no organomegaly  EXTREMITIES:  Left arm in sling   SKIN: left arm ecchymosis   NEUROLOGIC: negative  PSYCHIATRIC: confused     Lab/Data Review:  Lab results reviewed. For significant abnormal values and values requiring intervention, see assessment and plan.          Assessment:     Principal Problem:    Hypoxia (10/2/2017)      Overview: Probable COPD + obesity    Active Problems:    Tobacco abuse (4/17/2013)      Chronic bronchitis (HCC) (4/17/2013)      GERD (gastroesophageal reflux disease) (4/17/2013)      Overview: chronic      DM type 2 (diabetes mellitus, type 2) ([facility] (4/17/2013)      Essential hypertension (10/21/2016)      Pacemaker (1/13/2017)      Overview: 1. Biotronik MRI Pacemaker - Jan 2017      Paroxysmal atrial fibrillation ([facility] (6/6/2017)      Closed fracture of shaft of left humerus (10/2/2017)      Overview: Fall with fx on 9/29/17      Morbid obesity ([facility] (12/1/6150)      Metabolic encephalopathy (10/2/7801)        Plan:     # Metabolic encephalopathy of unclear etiology  -could be secondary to the use of narcotics   -unable to work with PT today due to severe confusion   -stop Norco. Will use tramadol prn for severe pain and tylenol for mild pain   -CT scan of the head ordered   -IV haldol ordered   -She was converted to inpatient status today.  PMH, social history, family history, ROS reviewed and remains the same as per H&P typed on 10/2/17     #hypoxia possibly is multifactorial   -chest x ray reviewed by me today and consistent with COPD ( hyperinflated lungs)  -continue supplementary O2   -Incentive spirometer     #uncontrolled DM type II   -Lantus increased to 15 U per day     DVT prophylaxis: on eliquis       Signed By: Mary Younger MD     October 3, 2017

## 2017-10-03 NOTE — PROGRESS NOTES
Received a call from Khari at [facility] / they are working on patient's Home O2 order / having to verify some information prior to completing order. Per Khari, they will need information in MD progress note that shows needs for W/C so they can get approval.  Khari aware we are waiting on another PT evaluation.

## 2017-10-03 NOTE — PROGRESS NOTES
END OF SHIFT NOTE:    Intake/Output      Voiding: YES  Catheter: NO  Drain:              Stool:  0 occurrences. Stool Assessment  Stool Color: Brown (10/03/17 1400)  Stool Appearance: Soft (10/03/17 1400)  Stool Amount: Medium (10/03/17 1400)  Stool Source/Status: Rectum (10/03/17 1400)    Emesis:  0 occurrences. VITAL SIGNS  Patient Vitals for the past 12 hrs:   Temp Pulse Resp BP SpO2   10/03/17 1615 98.8 °F (37.1 °C) 71 20 163/78 94 %   10/03/17 1116 99.2 °F (37.3 °C) 70 20 139/79 99 %   10/03/17 1109 - - - - 95 %   10/03/17 0729 98.2 °F (36.8 °C) 73 20 160/88 96 %       Pain Assessment  Pain 1  Pain Scale 1: Numeric (0 - 10) (10/03/17 1905)  Pain Intensity 1: 8 (10/03/17 1905)  Patient Stated Pain Goal: 0 (10/03/17 1905)  Pain Reassessment 1: Yes (10/02/17 1110)  Pain Onset 1: pta (10/02/17 0942)  Pain Location 1: Arm (left arm from fall) (10/03/17 1905)  Pain Orientation 1: Left (10/02/17 1039)  Pain Description 1: Aching (10/02/17 1039)  Pain Intervention(s) 1: Medication (see MAR) (10/03/17 1905)    Ambulating  Yes    Additional Information:     Shift report given to oncoming nurse at the bedside.     Kayley Woodson

## 2017-10-03 NOTE — PROGRESS NOTES
Received a call back from Michele Luevano. Michele Luevano is aware that MD has changed patient to inpatient status and would like to pursue STR. Michele Luevano in agreement. Will speak to patient's  when he arrives to the hospital and give him a list of SNF options and then follow up with family for choices. Will continue to follow and get referrals made when family decides. Called Areli at [facility] / [facility] (for Home O2 and W/C) on hold right now. [facility] aware that patient will not need HH r/t possible rehab admission.
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
## 2017-10-04 LAB
ALBUMIN SERPL-MCNC: 2.8 G/DL (ref 3.2–4.6)
ALBUMIN/GLOB SERPL: 0.6 {RATIO} (ref 1.2–3.5)
ALP SERPL-CCNC: 61 U/L (ref 50–136)
ALT SERPL-CCNC: 15 U/L (ref 12–65)
ANION GAP SERPL CALC-SCNC: 8 MMOL/L (ref 7–16)
AST SERPL-CCNC: 20 U/L (ref 15–37)
BASOPHILS # BLD: 0 K/UL (ref 0–0.2)
BASOPHILS NFR BLD: 1 % (ref 0–2)
BILIRUB SERPL-MCNC: 1 MG/DL (ref 0.2–1.1)
BUN SERPL-MCNC: 19 MG/DL (ref 8–23)
CALCIUM SERPL-MCNC: 8.8 MG/DL (ref 8.3–10.4)
CHLORIDE SERPL-SCNC: 99 MMOL/L (ref 98–107)
CO2 SERPL-SCNC: 30 MMOL/L (ref 21–32)
CREAT SERPL-MCNC: 0.93 MG/DL (ref 0.6–1)
DIFFERENTIAL METHOD BLD: ABNORMAL
EOSINOPHIL # BLD: 0.2 K/UL (ref 0–0.8)
EOSINOPHIL NFR BLD: 2 % (ref 0.5–7.8)
ERYTHROCYTE [DISTWIDTH] IN BLOOD BY AUTOMATED COUNT: 14.4 % (ref 11.9–14.6)
GLOBULIN SER CALC-MCNC: 4.7 G/DL (ref 2.3–3.5)
GLUCOSE BLD STRIP.AUTO-MCNC: 207 MG/DL (ref 65–100)
GLUCOSE BLD STRIP.AUTO-MCNC: 211 MG/DL (ref 65–100)
GLUCOSE BLD STRIP.AUTO-MCNC: 242 MG/DL (ref 65–100)
GLUCOSE BLD STRIP.AUTO-MCNC: 258 MG/DL (ref 65–100)
GLUCOSE BLD STRIP.AUTO-MCNC: 279 MG/DL (ref 65–100)
GLUCOSE SERPL-MCNC: 150 MG/DL (ref 65–100)
HCT VFR BLD AUTO: 31.6 % (ref 35.8–46.3)
HGB BLD-MCNC: 9.9 G/DL (ref 11.7–15.4)
IMM GRANULOCYTES # BLD: 0 K/UL (ref 0–0.5)
IMM GRANULOCYTES NFR BLD: 0.2 % (ref 0–5)
LYMPHOCYTES # BLD: 1.6 K/UL (ref 0.5–4.6)
LYMPHOCYTES NFR BLD: 19 % (ref 13–44)
MCH RBC QN AUTO: 27.3 PG (ref 26.1–32.9)
MCHC RBC AUTO-ENTMCNC: 31.3 G/DL (ref 31.4–35)
MCV RBC AUTO: 87.1 FL (ref 79.6–97.8)
MM INDURATION POC: 0 MM (ref 0–5)
MONOCYTES # BLD: 0.9 K/UL (ref 0.1–1.3)
MONOCYTES NFR BLD: 11 % (ref 4–12)
NEUTS SEG # BLD: 5.5 K/UL (ref 1.7–8.2)
NEUTS SEG NFR BLD: 67 % (ref 43–78)
PLATELET # BLD AUTO: 265 K/UL (ref 150–450)
PMV BLD AUTO: 10.4 FL (ref 10.8–14.1)
POTASSIUM SERPL-SCNC: 4.1 MMOL/L (ref 3.5–5.1)
PPD POC: NEGATIVE NEGATIVE
PROT SERPL-MCNC: 7.5 G/DL (ref 6.3–8.2)
RBC # BLD AUTO: 3.63 M/UL (ref 4.05–5.25)
SODIUM SERPL-SCNC: 137 MMOL/L (ref 136–145)
WBC # BLD AUTO: 8.3 K/UL (ref 4.3–11.1)

## 2017-10-04 PROCEDURE — 74011250637 HC RX REV CODE- 250/637: Performed by: INTERNAL MEDICINE

## 2017-10-04 PROCEDURE — 80053 COMPREHEN METABOLIC PANEL: CPT | Performed by: INTERNAL MEDICINE

## 2017-10-04 PROCEDURE — 97530 THERAPEUTIC ACTIVITIES: CPT

## 2017-10-04 PROCEDURE — 36415 COLL VENOUS BLD VENIPUNCTURE: CPT | Performed by: FAMILY MEDICINE

## 2017-10-04 PROCEDURE — 65270000029 HC RM PRIVATE

## 2017-10-04 PROCEDURE — 74011636637 HC RX REV CODE- 636/637: Performed by: INTERNAL MEDICINE

## 2017-10-04 PROCEDURE — 85025 COMPLETE CBC W/AUTO DIFF WBC: CPT | Performed by: FAMILY MEDICINE

## 2017-10-04 PROCEDURE — 74011250637 HC RX REV CODE- 250/637: Performed by: FAMILY MEDICINE

## 2017-10-04 PROCEDURE — 82962 GLUCOSE BLOOD TEST: CPT

## 2017-10-04 RX ORDER — INSULIN LISPRO 100 [IU]/ML
4 INJECTION, SOLUTION INTRAVENOUS; SUBCUTANEOUS
Status: DISCONTINUED | OUTPATIENT
Start: 2017-10-04 | End: 2017-10-06 | Stop reason: HOSPADM

## 2017-10-04 RX ADMIN — APIXABAN 5 MG: 2.5 TABLET, FILM COATED ORAL at 22:39

## 2017-10-04 RX ADMIN — PRAVASTATIN SODIUM 80 MG: 80 TABLET ORAL at 22:40

## 2017-10-04 RX ADMIN — POLYETHYLENE GLYCOL 3350 17 G: 17 POWDER, FOR SOLUTION ORAL at 08:01

## 2017-10-04 RX ADMIN — METOPROLOL TARTRATE 50 MG: 50 TABLET ORAL at 17:21

## 2017-10-04 RX ADMIN — METOPROLOL TARTRATE 50 MG: 50 TABLET ORAL at 08:02

## 2017-10-04 RX ADMIN — APIXABAN 5 MG: 2.5 TABLET, FILM COATED ORAL at 08:03

## 2017-10-04 RX ADMIN — INSULIN LISPRO 4 UNITS: 100 INJECTION, SOLUTION INTRAVENOUS; SUBCUTANEOUS at 17:23

## 2017-10-04 RX ADMIN — Medication 10 ML: at 12:22

## 2017-10-04 RX ADMIN — INSULIN LISPRO 6 UNITS: 100 INJECTION, SOLUTION INTRAVENOUS; SUBCUTANEOUS at 12:21

## 2017-10-04 RX ADMIN — ACETAMINOPHEN 650 MG: 325 TABLET ORAL at 20:32

## 2017-10-04 RX ADMIN — INSULIN LISPRO 4 UNITS: 100 INJECTION, SOLUTION INTRAVENOUS; SUBCUTANEOUS at 08:00

## 2017-10-04 RX ADMIN — INSULIN LISPRO 4 UNITS: 100 INJECTION, SOLUTION INTRAVENOUS; SUBCUTANEOUS at 17:22

## 2017-10-04 RX ADMIN — INSULIN LISPRO 6 UNITS: 100 INJECTION, SOLUTION INTRAVENOUS; SUBCUTANEOUS at 22:40

## 2017-10-04 RX ADMIN — VENLAFAXINE HYDROCHLORIDE 75 MG: 75 CAPSULE, EXTENDED RELEASE ORAL at 08:03

## 2017-10-04 RX ADMIN — INSULIN GLARGINE 15 UNITS: 100 INJECTION, SOLUTION SUBCUTANEOUS at 07:58

## 2017-10-04 RX ADMIN — AMLODIPINE BESYLATE 2.5 MG: 5 TABLET ORAL at 08:03

## 2017-10-04 NOTE — PROGRESS NOTES
Pt current iv removed due to clotted, refused another unless she needs it in an emergency.  States does not want another one tonight

## 2017-10-04 NOTE — PROGRESS NOTES
Received call and VM from patient's daughter Nathan Waggoner). Choices for STR:  (1) [facility]  / (2) [facility] / (3) [facility]. Referral placed to [facility] through [facility].

## 2017-10-04 NOTE — PROGRESS NOTES
Problem: Mobility Impaired (Adult and Pediatric)  Goal: *Acute Goals and Plan of Care (Insert Text)  LTG:  (1.)Ms. Gayla Leon will move from supine to sit and sit to supine , scoot up and down and roll side to side with MODIFIED INDEPENDENCE within 7 day(s). (2.)Ms. Gayla Leon will transfer from bed to chair and chair to bed with SUPERVISION using the least restrictive device within 7 day(s). (3.)Ms. Gayla Leon will ambulate with STAND BY ASSIST for 250 feet with the least restrictive device within 7 day(s), NWB L UE. (4.)Ms. Gayla Leon will perform 4 steps with HR and SBA within 7 days for safety ascending and descending stairs for home. (5.)Ms. Gayla Leon will chivo L UE sling with independence within 7 days for L UE support s/p L humerus fracture. ____________________________________________________________________________________________      PHYSICAL THERAPY: Daily Note, Treatment Day: 2nd and AM 10/4/2017  INPATIENT: Hospital Day: 4  [place] / Product Type: Medicare /       NWB L UE, in sling     NAME/AGE/GENDER: Raysa Donaldson is a 67 y.o. female              PRIMARY DIAGNOSIS: Hypoxia  Metabolic encephalopathy Hypoxia Hypoxia        ICD-10: Treatment Diagnosis:       · Generalized Muscle Weakness (M62.81)  · Difficulty in walking, Not elsewhere classified (R26.2)  · History of falling (Z91.81)   Precaution/Allergies:  Aspirin       ASSESSMENT:      Ms. Gayla Leon a lot less confused today but still unsteady. Tried a rosibel walker again today and a quad cane. It doesn't seem to help with either one. Ms. Gayla Leon tells me she wants to be home but she is so afraid she will fall. She admits she does not feel steady. She states \"I understand about going to rehab even though I want to go home. \"  This section established at most recent assessment   PROBLEM LIST (Impairments causing functional limitations):  1. Decreased ADL/Functional Activities  2.  Decreased Transfer Abilities  3. Decreased Ambulation Ability/Technique  4. Decreased Balance  5. Increased Pain  6. Decreased Activity Tolerance  7. Decreased Flexibility/Joint Mobility  8. Decreased Cognition    INTERVENTIONS PLANNED: (Benefits and precautions of physical therapy have been discussed with the patient.)  1. Balance Exercise  2. Bed Mobility  3. Family Education  4. Gait Training  5. Home Exercise Program (HEP)  6. Therapeutic Activites  7. Therapeutic Exercise/Strengthening  8. Transfer Training  9. Group Therapy      TREATMENT PLAN: Frequency/Duration: 3 times a week for duration of hospital stay  Rehabilitation Potential For Stated Goals: GOOD      RECOMMENDED REHABILITATION/EQUIPMENT: (at time of discharge pending progress): Due to the probability of continued deficits (see above) this patient will likely need continued skilled physical therapy after discharge. Equipment:   · TBD                   HISTORY:   History of Present Injury/Illness (Reason for Referral):  Patient history was obtained from the ER provider prior to seeing the patient. Patient is a 67 y.o. female who presents to the ER due to arm pain and AMS with hallucinations per family. She had a fall 4 days ago and suffered a humerus fx. She is to f/u with ortho as outpt. She came in tonight because she states her pain is not controlled. Family reports that she is confused and hearing family members who were not in the house. ER workup showed her to be hypoxic on room air. Pt denies any prior lung diseases. She did smoke for many years (52 pack years), but quit 5 years ago. Her mental status has improved since arrival and being on oxygen. She denies wheezing, but does report a dry cough. Past Medical History/Comorbidities:   Ms. 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); DM type 2 (diabetes mellitus, type 2) ([facility] ( 6 yrs); DM type 2 (diabetes mellitus, type 2) ([facility] [address] (4/17/2013); DVT (deep venous thrombosis) ([facility] [address] Essential hypertension; Essential hypertension (10/21/2016); GERD (gastroesophageal reflux disease) (4/17/2013); GERD (gastroesophageal reflux disease) (4/17/2013); History of non-Hodgkin's lymphoma (12 yrs ago); history of PUD (peptic ulcer disease) (4/17/2013); history of PUD (peptic ulcer disease) (4/17/2013); History of pulmonary embolus (PE) (20+ yrs); History of tobacco abuse (52 yrs); Hyperlipidemia (4/17/2013); Insomnia (4/17/2013); Insomnia (4/17/2013); Metabolic encephalopathy (16/3/5843); Non Hodgkin's lymphoma ([facility] [address] (4/17/2013); Obesity (BMI 30-39.9) (33.8); Pacemaker; Paroxysmal atrial fibrillation ([facility] [address] (6/6/2017); Psychiatric disorder; Pulmonary embolism ([address] and Thyroid disease. She also has no past medical history of Asthma; Autoimmune disease ([address] CAD (coronary artery disease); Chronic kidney disease; Chronic obstructive pulmonary disease ([address] DEMENTIA; Difficult intubation; Heart failure ([address] Infectious disease; Liver disease; Malignant hyperthermia due to anesthesia; Nausea & vomiting; Pseudocholinesterase deficiency; Seizures ([address] Sleep apnea; Stroke [facility]); or Unspecified adverse effect of anesthesia. Ms. Niall Hernadez  has a past surgical history that includes syed and bso; appendectomy; colectomy; partial thyroidectomy (5/00); cataract removal (2010); fracture tx; breast augmentation (40 yrs ago); neurological procedure unlisted; and pacemaker.   Social History/Living Environment:   Home Environment: Private residence  # Steps to Enter: 4  Rails to Enter: Yes  One/Two Story Residence: One story  Living Alone: No  Support Systems: Child(tato), Family member(s), Spouse/Significant Other/Partner  Patient Expects to be Discharged to[de-identified] Private residence  Current DME Used/Available at Home: Melody Templeton, rollator  Tub or Shower Type: Tub/Shower combination  Prior Level of Function/Work/Activity:  Lives with spouse per pt; independent with ADLs and driving per pt prior to fall; uses rollator walker  Personal Factors:          Sex:  female        Age:  67 y.o. Overall Behavior:  Agreeable to PT assessment, confused   Number of Personal Factors/Comorbidities that affect the Plan of Care:  Fall with humerus fracture L UE, confusion, DM 3+: HIGH COMPLEXITY   EXAMINATION:   Most Recent Physical Functioning:   Gross Assessment:                  Posture:     Balance:  Sitting: Impaired  Sitting - Static: Fair (occasional)  Sitting - Dynamic: Fair (occasional)  Standing: Impaired; With support  Standing - Static: Fair  Standing - Dynamic : Fair Bed Mobility:     Wheelchair Mobility:     Transfers:  Sit to Stand: Contact guard assistance;Minimum assistance  Stand to Sit: Contact guard assistance  Gait:     Base of Support: Widened  Speed/Viv: Slow;Pace decreased (<100 feet/min)  Step Length: Right shortened;Left shortened  Gait Abnormalities: Path deviations  Distance (ft): 40 Feet (ft) (x 2 with seated rest break)  Assistive Device: Walker rosibel;Cane, quad  Ambulation - Level of Assistance: Minimal assistance  Interventions: Verbal cues; Tactile cues; Safety awareness training;Manual cues       Body Structures Involved:  1. Muscles Body Functions Affected:  1. Movement Related Activities and Participation Affected:  1. General Tasks and Demands  2. Mobility  3. Self Care   Number of elements that affect the Plan of Care: 4+: HIGH COMPLEXITY   CLINICAL PRESENTATION:   Presentation: Evolving clinical presentation with changing clinical characteristics: MODERATE COMPLEXITY   CLINICAL DECISION MAKIN [employer] [facility] Mobility Inpatient Short Form  How much difficulty does the patient currently have. .. Unable A Lot A Little None   1.   Turning over in bed (including adjusting bedclothes, sheets and blankets)? [ ] 1   [ ] 2   [X] 3   [ ] 4   2. Sitting down on and standing up from a chair with arms ( e.g., wheelchair, bedside commode, etc.)   [ ] 1   [ ] 2   [X] 3   [ ] 4   3. Moving from lying on back to sitting on the side of the bed? [ ] 1   [ ] 2   [X] 3   [ ] 4   How much help from another person does the patient currently need. .. Total A Lot A Little None   4. Moving to and from a bed to a chair (including a wheelchair)? [ ] 1   [ ] 2   [X] 3   [ ] 4   5. Need to walk in hospital room? [ ] 1   [ ] 2   [X] 3   [ ] 4   6. Climbing 3-5 steps with a railing? [ ] 1   [ ] 2   [X] 3   [ ] 4   © 2007, Trustees of [address] [place], [place] [address], under license to [employer]. All rights reserved    Score:  Initial: 18 Most Recent: X (Date: -- )     Interpretation of Tool:  Represents activities that are increasingly more difficult (i.e. Bed mobility, Transfers, Gait). Score 24 23 22-20 19-15 14-10 9-7 6       Modifier CH CI CJ CK CL CM CN         · Mobility - Walking and Moving Around:               - CURRENT STATUS:    CK - 40%-59% impaired, limited or restricted               - GOAL STATUS:           CJ - 20%-39% impaired, limited or restricted               - D/C STATUS:                       ---------------To be determined---------------  [place] / [place] / Product Type: Medicare /       Medical Necessity:     · Patient is expected to demonstrate progress in strength, range of motion, balance and coordination to decrease assistance required with overall functional mobility, transfers, ambulation. · Patient demonstrates good rehab potential due to higher previous functional level. Reason for Services/Other Comments:  · Patient continues to require present interventions due to patient's inability to perform bed mobility, transfers, ambulation.    Use of outcome tool(s) and clinical judgement create a POC that gives a: Clear prediction of patient's progress: LOW COMPLEXITY                 TREATMENT:   (In addition to Assessment/Re-Assessment sessions the following treatments were rendered)   Pre-treatment Symptoms/Complaints: \"I want my husbands cooking\"  Pain: Initial:   Pain Intensity 1: 0  Post Session:  Worn out. Wanted to lay down. Therapeutic Activity: (   25): Therapeutic activities including Bed transfers, Chair transfers, Toilet transfers and Ambulation on level ground to improve mobility. Required moderate Verbal cues; Tactile cues; Safety awareness training;Manual cues to promote motor control of bilateral, upper extremity(s), lower extremity(s). Gait with quad cane, rosibel walker and nothing. Also bsc transfer. Braces/Orthotics/Lines/Etc:   · sling L UE  · O2 Device: Nasal cannula  Treatment/Session Assessment:    · Response to Treatment:  confusion  · Interdisciplinary Collaboration:  · Physical Therapist  · Registered Nurse  · After treatment position/precautions:  · Supine in bed, Bed alarm/tab alert on, Call light within reach and RN notified  · Compliance with Program/Exercises: Will assess as treatment progresses. · Recommendations/Intent for next treatment session: \"Next visit will focus on advancements to more challenging activities\".   Total Treatment Duration:PT Patient Time In/Time Out  Time In: 1030  Time Out: Fausto Munson, LESLY

## 2017-10-04 NOTE — PROGRESS NOTES
END OF SHIFT NOTE:    Intake/Output      Voiding: YES  Catheter: NO  Drain:              Stool:  1 occurrences. Stool Assessment  Stool Color: Balwinder Yonatan (10/04/17 0855)  Stool Appearance: Soft (10/04/17 0855)  Stool Amount: Large (10/04/17 0855)  Stool Source/Status: Rectum (10/04/17 0855)    Emesis:  0 occurrences. VITAL SIGNS  Patient Vitals for the past 12 hrs:   Temp Pulse Resp BP SpO2   10/04/17 1642 99.6 °F (37.6 °C) 71 18 139/87 95 %   10/04/17 1122 98.3 °F (36.8 °C) 70 18 135/74 94 %   10/04/17 0757 98.2 °F (36.8 °C) 70 19 141/73 96 %       Pain Assessment  Pain 1  Pain Scale 1: Visual (10/04/17 1112)  Pain Intensity 1: 0 (10/04/17 1112)  Patient Stated Pain Goal: 0 (10/04/17 0815)  Pain Reassessment 1: Yes (10/03/17 1957)  Pain Onset 1: pta (10/02/17 0942)  Pain Location 1: Arm (left arm from fall) (10/03/17 1905)  Pain Orientation 1: Left (10/02/17 1039)  Pain Description 1: Aching (10/02/17 1039)  Pain Intervention(s) 1: Medication (see MAR) (10/03/17 1905)    Ambulating  Yes    Additional Information:     Shift report given to oncoming nurse at the bedside.     Ree Part

## 2017-10-04 NOTE — PROGRESS NOTES
Progress Note    Patient: Nakia Ford MRN: [id number]  SSN: [id number]    YOB: 1945  Age: 67 y.o. Sex: female      Admit Date: 10/1/2017    LOS: 1 day     Subjective:     Patient was seen at bedside. Mental status has improved but she is still very fragile and unsteady on her feet. Case discussed with PT today. CM has sent referrals for STR. Objective:     Vitals:    10/03/17 2315 10/04/17 0401 10/04/17 0757 10/04/17 1122   BP: 125/70 149/74 141/73 135/74   Pulse: 67 61 70 70   Resp: 20 20 19 18   Temp: 98.7 °F (37.1 °C) 97.8 °F (36.6 °C) 98.2 °F (36.8 °C) 98.3 °F (36.8 °C)   SpO2: 97% 95% 96% 94%   Weight:       Height:            Intake and Output:  Current Shift: 10/04 0701 - 10/04 1900  In: 237 [P.O.:237]  Out: -   Last three shifts: 10/02 1901 - 10/04 0700  In: 421 [P.O.:421]  Out: -     Physical Exam:   GENERAL: alert,  appears stated age  EYE: conjunctivae/corneas clear. PERRL, EOM's intact. Fundi benign  LYMPHATIC: Cervical, supraclavicular, and axillary nodes normal.   THROAT & NECK: normal and no erythema or exudates noted. LUNG: clear to auscultation bilaterally  HEART: regular rate and rhythm, S1, S2 normal, no murmur, click, rub or gallop  ABDOMEN: soft, non-tender. Bowel sounds normal. No masses,  no organomegaly  EXTREMITIES:  Left arm in sling   SKIN: ecchymosis in the left arm and leg  NEUROLOGIC: negative  PSYCHIATRIC: alert and oriented     Lab/Data Review:  Lab results reviewed. For significant abnormal values and values requiring intervention, see assessment and plan.          Assessment:     Principal Problem:    Hypoxia (10/2/2017)      Overview: Probable COPD + obesity    Active Problems:    Tobacco abuse (4/17/2013)      Chronic bronchitis (HCC) (4/17/2013)      GERD (gastroesophageal reflux disease) (4/17/2013)      Overview: chronic      DM type 2 (diabetes mellitus, type 2) ([facility] (4/17/2013)      Essential hypertension (10/21/2016)      Pacemaker (1/13/2017)      Overview: 1.  Biotronik MRI Pacemaker - Jan 2017      Paroxysmal atrial fibrillation ([facility] (6/6/2017)      Closed fracture of shaft of left humerus (10/2/2017)      Overview: Fall with fx on 9/29/17      Morbid obesity ([facility] (60/4/2084)      Metabolic encephalopathy (62/7/8281)        Plan:     # Metabolic encephalopathy of unclear etiology  -could be secondary to the use of narcotics   -better today   -stopped Norco. Using tramadol prn for severe pain and tylenol for mild pain   -CT scan of the head did not show any acute lesion   -IV haldol prn      #hypoxia possibly is multifactorial   -chest x ray with hyperinflated lungs   -continue supplementary O2   -Incentive spirometer     #uncontrolled DM type II   -Lantus 15 U per day   -will add pre meal humalog     DVT prophylaxis: on eliquis       Signed By: Michelle Sue MD     October 4, 2017

## 2017-10-04 NOTE — PROGRESS NOTES
Follow-up visit with Ms. Felecia Pineda and her spouse. Offered spiritual interventions, including prayer.       Bijal Ansari 68  Board Certified

## 2017-10-04 NOTE — CDMP QUERY
Please clarify if this patient is being treated/managed for:    Auditory hallucinations in the setting of recent fracture, on narcotics, hearing family members that are not there, treated with haldol prn.     =>Other Explanation of clinical findings  =>Unable to Determine (no explanation of clinical findings)    The medical record reflects the following:    Risk Factors:  Pt with recent humerus fx, taking new narcotics, hypoxic on ABG    Clinical Indicators: family reports pt is hearing family members that are not in the home    Treatment: prn haldol and supplemental O2. Please clarify and document your clinical opinion in the progress notes and discharge summary including the definitive and/or presumptive diagnosis, (suspected or probable), related to the above clinical findings. Please include clinical findings supporting your diagnosis.     Thanks,  Gracy Beckman RN, CDS  Compliant Documentation Management Program  [phone]

## 2017-10-05 LAB
GLUCOSE BLD STRIP.AUTO-MCNC: 121 MG/DL (ref 65–100)
GLUCOSE BLD STRIP.AUTO-MCNC: 219 MG/DL (ref 65–100)
GLUCOSE BLD STRIP.AUTO-MCNC: 245 MG/DL (ref 65–100)
GLUCOSE BLD STRIP.AUTO-MCNC: 296 MG/DL (ref 65–100)
MM INDURATION POC: 0 MM (ref 0–5)
PPD POC: NEGATIVE NEGATIVE

## 2017-10-05 PROCEDURE — 74011636637 HC RX REV CODE- 636/637: Performed by: INTERNAL MEDICINE

## 2017-10-05 PROCEDURE — 74011250637 HC RX REV CODE- 250/637: Performed by: FAMILY MEDICINE

## 2017-10-05 PROCEDURE — 77030027138 HC INCENT SPIROMETER -A

## 2017-10-05 PROCEDURE — 77010033678 HC OXYGEN DAILY

## 2017-10-05 PROCEDURE — 74011250637 HC RX REV CODE- 250/637: Performed by: INTERNAL MEDICINE

## 2017-10-05 PROCEDURE — 65270000029 HC RM PRIVATE

## 2017-10-05 PROCEDURE — 94760 N-INVAS EAR/PLS OXIMETRY 1: CPT

## 2017-10-05 PROCEDURE — 82962 GLUCOSE BLOOD TEST: CPT

## 2017-10-05 RX ORDER — METFORMIN HYDROCHLORIDE 500 MG/1
500 TABLET ORAL 2 TIMES DAILY WITH MEALS
Status: DISCONTINUED | OUTPATIENT
Start: 2017-10-05 | End: 2017-10-06 | Stop reason: HOSPADM

## 2017-10-05 RX ORDER — GLIMEPIRIDE 2 MG/1
2 TABLET ORAL
Status: DISCONTINUED | OUTPATIENT
Start: 2017-10-05 | End: 2017-10-05

## 2017-10-05 RX ORDER — TRAMADOL HYDROCHLORIDE 50 MG/1
50 TABLET ORAL
Qty: 30 TAB | Refills: 0 | Status: SHIPPED | OUTPATIENT
Start: 2017-10-05 | End: 2017-10-26 | Stop reason: SDUPTHER

## 2017-10-05 RX ORDER — METFORMIN HYDROCHLORIDE 500 MG/1
500 TABLET ORAL 2 TIMES DAILY WITH MEALS
Qty: 60 TAB | Refills: 0 | Status: SHIPPED | OUTPATIENT
Start: 2017-10-05 | End: 2018-01-26 | Stop reason: SDUPTHER

## 2017-10-05 RX ORDER — CLONAZEPAM 0.5 MG/1
0.5 TABLET ORAL
Qty: 15 TAB | Refills: 0 | Status: SHIPPED | OUTPATIENT
Start: 2017-10-05 | End: 2018-01-26 | Stop reason: SDUPTHER

## 2017-10-05 RX ORDER — AMLODIPINE BESYLATE 5 MG/1
5 TABLET ORAL
Status: DISCONTINUED | OUTPATIENT
Start: 2017-10-06 | End: 2017-10-06 | Stop reason: HOSPADM

## 2017-10-05 RX ORDER — AMLODIPINE BESYLATE 5 MG/1
5 TABLET ORAL
Qty: 30 TAB | Refills: 0 | Status: SHIPPED | OUTPATIENT
Start: 2017-10-06 | End: 2018-01-26 | Stop reason: SDUPTHER

## 2017-10-05 RX ORDER — INSULIN LISPRO 100 [IU]/ML
INJECTION, SOLUTION INTRAVENOUS; SUBCUTANEOUS
Qty: 1 VIAL | Refills: 0 | Status: SHIPPED
Start: 2017-10-05 | End: 2018-05-07 | Stop reason: CLARIF

## 2017-10-05 RX ORDER — POLYETHYLENE GLYCOL 3350 17 G/17G
17 POWDER, FOR SOLUTION ORAL
Qty: 505 G | Refills: 0 | Status: SHIPPED | OUTPATIENT
Start: 2017-10-05 | End: 2018-05-07 | Stop reason: CLARIF

## 2017-10-05 RX ADMIN — PRAVASTATIN SODIUM 80 MG: 80 TABLET ORAL at 21:10

## 2017-10-05 RX ADMIN — TRAMADOL HYDROCHLORIDE 50 MG: 50 TABLET, FILM COATED ORAL at 21:10

## 2017-10-05 RX ADMIN — INSULIN LISPRO 4 UNITS: 100 INJECTION, SOLUTION INTRAVENOUS; SUBCUTANEOUS at 12:29

## 2017-10-05 RX ADMIN — METFORMIN HYDROCHLORIDE 500 MG: 500 TABLET, FILM COATED ORAL at 09:37

## 2017-10-05 RX ADMIN — VENLAFAXINE HYDROCHLORIDE 75 MG: 75 CAPSULE, EXTENDED RELEASE ORAL at 08:13

## 2017-10-05 RX ADMIN — APIXABAN 5 MG: 2.5 TABLET, FILM COATED ORAL at 08:13

## 2017-10-05 RX ADMIN — AMLODIPINE BESYLATE 2.5 MG: 5 TABLET ORAL at 08:13

## 2017-10-05 RX ADMIN — INSULIN GLARGINE 15 UNITS: 100 INJECTION, SOLUTION SUBCUTANEOUS at 08:13

## 2017-10-05 RX ADMIN — SITAGLIPTIN 100 MG: 100 TABLET, FILM COATED ORAL at 09:37

## 2017-10-05 RX ADMIN — INSULIN LISPRO 6 UNITS: 100 INJECTION, SOLUTION INTRAVENOUS; SUBCUTANEOUS at 12:29

## 2017-10-05 RX ADMIN — APIXABAN 5 MG: 2.5 TABLET, FILM COATED ORAL at 21:10

## 2017-10-05 RX ADMIN — INSULIN LISPRO 4 UNITS: 100 INJECTION, SOLUTION INTRAVENOUS; SUBCUTANEOUS at 07:30

## 2017-10-05 RX ADMIN — METOPROLOL TARTRATE 50 MG: 50 TABLET ORAL at 08:13

## 2017-10-05 RX ADMIN — METOPROLOL TARTRATE 50 MG: 50 TABLET ORAL at 16:03

## 2017-10-05 RX ADMIN — INSULIN LISPRO 4 UNITS: 100 INJECTION, SOLUTION INTRAVENOUS; SUBCUTANEOUS at 16:53

## 2017-10-05 RX ADMIN — METFORMIN HYDROCHLORIDE 500 MG: 500 TABLET, FILM COATED ORAL at 16:03

## 2017-10-05 RX ADMIN — ACETAMINOPHEN 650 MG: 325 TABLET ORAL at 16:03

## 2017-10-05 RX ADMIN — INSULIN LISPRO 4 UNITS: 100 INJECTION, SOLUTION INTRAVENOUS; SUBCUTANEOUS at 16:54

## 2017-10-05 NOTE — PROGRESS NOTES
0655[id number] Resting in bed. No needs voiced. No s/s of acute distress. 1809-  END OF SHIFT NOTE:    Intake/Output  10/05 0701 - 10/05 1900  In: 1200 [P.O.:1200]  Out: -    Voiding: YES  Catheter: NO  Drain:              Stool:  1 occurrences. Stool Assessment  Stool Color: Yasmin Forth (10/05/17 1114)  Stool Appearance: Soft (10/05/17 1114)  Stool Amount: Small (10/05/17 1114)  Stool Source/Status: Rectum (10/05/17 1114)    Emesis:  0 occurrences. VITAL SIGNS  Patient Vitals for the past 12 hrs:   Temp Pulse Resp BP SpO2   10/05/17 1451 97.9 °F (36.6 °C) 69 20 120/50 96 %   10/05/17 1113 98.1 °F (36.7 °C) 69 20 151/76 95 %   10/05/17 0812 - - - - 96 %   10/05/17 0727 98.6 °F (37 °C) 70 20 158/74 94 %       Pain Assessment  Pain 1  Pain Scale 1: Numeric (0 - 10) (10/05/17 1634)  Pain Intensity 1: 1 (10/05/17 1634)  Patient Stated Pain Goal: 0 (10/04/17 0815)  Pain Reassessment 1: Yes (10/05/17 1634)  Pain Onset 1: pta (10/02/17 0942)  Pain Location 1: Shoulder (10/05/17 1603)  Pain Orientation 1: Left (10/05/17 1603)  Pain Description 1: Aching (10/05/17 1603)  Pain Intervention(s) 1: Medication (see MAR) (10/05/17 1603)    Ambulating  Yes    Additional Information: Pts VSS and is in no acute distress. Pt should be discharged tomorrow to SNF for PT rehab. Pt has had good day with minimal pain complaints. Pt was weaned off oxygen and is now on room air. Shift report given to oncoming nurse at the bedside.     Dee Davis

## 2017-10-05 NOTE — PROGRESS NOTES
Spoke with Arlene (admissions) at [facility] / she is reviewing information on patient. Will send other information requested / she will call back after reviewing chart.

## 2017-10-05 NOTE — DISCHARGE SUMMARY
Discharge Summary     Patient: Abelardo Gonsalez MRN: [id number]  SSN: [id number]    YOB: 1945  Age: 67 y.o. Sex: female       Admit Date: 10/1/2017    Discharge Date: 10/5/2017      Admission Diagnoses: Hypoxia  Metabolic encephalopathy    Discharge Diagnoses:   Problem List as of 10/5/2017  Date Reviewed: 5/7/2017          Codes Class Noted - Resolved    Metabolic encephalopathy CTT-97-GD: G93.41  ICD-9-CM: 348.31  10/3/2017 - Present        * (Principal)Hypoxia ICD-10-CM: R09.02  ICD-9-CM: 799.02  10/2/2017 - Present    Overview Signed 10/2/2017  2:28 AM by Arielle Alcantar MD     Probable COPD + obesity             Closed fracture of shaft of left humerus (Chronic) ICD-10-CM: W87.908W  ICD-9-CM: 812.21  10/2/2017 - Present    Overview Signed 10/2/2017  2:00 AM by Arielle Alcantar MD     Fall with fx on 9/29/17             Morbid obesity ([facility] (Chronic) ICD-10-CM: E66.01  ICD-9-CM: 278.01  10/2/2017 - Present        Paroxysmal atrial fibrillation ([facility] [address] (Chronic) ICD-10-CM: I48.0  ICD-9-CM: 427.31  6/6/2017 - Present        Pacemaker ICD-10-CM: Z95.0  ICD-9-CM: V45.01  1/13/2017 - Present    Overview Signed 1/13/2017  6:14 PM by Korin Coto MD     1. [employer]ron[employer] MRI Pacemaker - Jan 2017             Atrial flutter [facility]) (Chronic) ICD-10-CM: P59.52  ICD-9-CM: 427.32  10/21/2016 - Present    Overview Signed 12/22/2016  9:25 AM by Korin Coto MD     1. Atrial Flutter - Dec 2015 - Ablation of right-sided isthmus-dependent atypical clock-wise atrial flutter.   2. ARACELY Showed RENO Velocity of 25cm/sec             Essential hypertension (Chronic) ICD-10-CM: I10  ICD-9-CM: 401.9  10/21/2016 - Present        H/O breast implant ICD-10-CM: Z98.82  ICD-9-CM: V43.82  4/17/2013 - Present        Tobacco abuse (Chronic) ICD-10-CM: Z72.0  ICD-9-CM: 305.1  4/17/2013 - Present        Chronic bronchitis (HCC) (Chronic) ICD-10-CM: F54  ICD-9-CM: 491.9  4/17/2013 - Present        Anxiety ICD-10-CM: F41.9  ICD-9-CM: 300.00  4/17/2013 - Present        Insomnia ICD-10-CM: G47.00  ICD-9-CM: 780.52  4/17/2013 - Present        GERD (gastroesophageal reflux disease) (Chronic) ICD-10-CM: K21.9  ICD-9-CM: 530.81  4/17/2013 - Present    Overview Signed 4/17/2013  4:27 PM by Rowan Morrow LPN     chronic             Hyperlipidemia ICD-10-CM: E78.5  ICD-9-CM: 272.4  4/17/2013 - Present        DM type 2 (diabetes mellitus, type 2) (HCC) (Chronic) ICD-10-CM: E11.9  ICD-9-CM: 250.00  4/17/2013 - Present        RESOLVED: Bradycardia ICD-10-CM: R00.1  ICD-9-CM: 427.89  1/12/2017 - 10/2/2017        RESOLVED: Non Hodgkin's lymphoma ([facility] ICD-10-CM: C85.90  ICD-9-CM: 202.80  4/17/2013 - 10/2/2017        RESOLVED: history of PUD (peptic ulcer disease) ICD-10-CM: K27.9  ICD-9-CM: 533.90  4/17/2013 - 10/2/2017               Discharge Condition: [facility] Course:     Patient is a 67 y.o. female with a PMH of DM type II, HTN, Afib on anticoagulation with eliquis  who presented to the ER for the first time on 9/27 after she tripped and fell on her left arm. She was diagnosed with a fracture of the left proximal humerus, she received pain meds and was sent to see ortho as an outpatient. She came back on 10/1  due to arm pain and AMS with hallucinations per family. Family reported that she was confused and hearing family members who were not in the house. ER workup showed her to be hypoxic on room air. Pt denied any prior lung diseases. She did smoke for many years (52 pack years), but quit 5 years ago. Her chest x ray was read as normal, but further evaluation showed hyperinflated lungs consistent with COPD/emphysema. She remained confused for 48 h but eventually her mental status improved. Most likely she had delirium due to a combination of pain, narcotic use and benzos and sedatives used at home ( was on ambien and clonazepam prn).  She is still requiring supplementary O2 but most likely within the next days she will be able to be taper off supplementary O2. She is very fragile and it has been considered she will benefit from inpatient rehab. It has been considered a combination of insulin and sulfonilureas could be too risky at her age ( she was using glimepiride and insulin at home). She is being discharged on long acting insulin + metformin+ [place] and [place]. Please follow BS levels. She will need to be seen by Ortho in the next days after being discharged. GENERAL: alert,  appears stated age  EYE: conjunctivae/corneas clear. PERRL, EOM's intact. Fundi benign  LYMPHATIC: Cervical, supraclavicular, and axillary nodes normal.   THROAT & NECK: normal and no erythema or exudates noted. LUNG: clear to auscultation bilaterally  HEART: regular rate and rhythm, S1, S2 normal, no murmur, click, rub or gallop  ABDOMEN: soft, non-tender. Bowel sounds normal. No masses,  no organomegaly  EXTREMITIES:  left arm in sling. Ecchymosis. SKIN: multiple ecchymosis on the left arm and leg   NEUROLOGIC: non focal   PSYCHIATRIC: alert         Consults: none    Significant Diagnostic Studies: see hospital course     Disposition: home    Discharge Medications:   Current Discharge Medication List      START taking these medications    Details   amLODIPine (NORVASC) 5 mg tablet Take 1 Tab by mouth daily (after breakfast). Qty: 30 Tab, Refills: 0      insulin lispro (HUMALOG) 100 unit/mL injection humalog SS TID before meals  Qty: 1 Vial, Refills: 0      SITagliptin (JANUVIA) 100 mg tablet Take 1 Tab by mouth daily. Qty: 30 Tab, Refills: 0      traMADol (ULTRAM) 50 mg tablet Take 1 Tab by mouth every eight (8) hours as needed. Max Daily Amount: 150 mg.  Qty: 30 Tab, Refills: 0         CONTINUE these medications which have CHANGED    Details   polyethylene glycol (MIRALAX) 17 gram/dose powder Take 17 g by mouth daily as needed.  1 tablespoon with 8 oz of water daily  Qty: 505 g, Refills: 0      clonazePAM (KLONOPIN) 0.5 mg tablet Take 1 Tab by mouth two (2) times daily as needed. Max Daily Amount: 1 mg. For anxiety  Qty: 15 Tab, Refills: 0      metFORMIN (GLUCOPHAGE) 500 mg tablet Take 1 Tab by mouth two (2) times daily (with meals). Qty: 60 Tab, Refills: 0         CONTINUE these medications which have NOT CHANGED    Details   ondansetron (ZOFRAN ODT) 4 mg disintegrating tablet Take 1 Tab by mouth every eight (8) hours as needed for Nausea. Qty: 11 Tab, Refills: 1      metoprolol tartrate (LOPRESSOR) 50 mg tablet Take 1 Tab by mouth two (2) times a day. Qty: 60 Tab, Refills: 6    Associated Diagnoses: Bradycardia      insulin degludec (TRESIBA FLEXTOUCH U-100) 100 unit/mL (3 mL) inpn 10 Units by SubCUTAneous route daily. Qty: 5 Pen, Refills: 6    Associated Diagnoses: Type 2 diabetes mellitus without complication, without long-term current use of insulin ([facility])      Insulin Needles, Disposable, (RELION PEN NEEDLES) 32 gauge x 5/32\" ndle Use daily with insulin  Qty: 100 Pen Needle, Refills: 6    Associated Diagnoses: Type 2 diabetes mellitus without complication, without long-term current use of insulin ([facility])      pravastatin (PRAVACHOL) 40 mg tablet Take 2 Tabs by mouth nightly. Qty: 180 Tab, Refills: 3    Associated Diagnoses: Mixed hyperlipidemia      venlafaxine-SR (EFFEXOR-XR) 75 mg capsule Take 1 Cap by mouth daily. Qty: 90 Cap, Refills: 3      apixaban (ELIQUIS) 5 mg tablet Take 1 Tab by mouth two (2) times a day. Qty: 14 Tab, Refills: 0    Associated Diagnoses: Atrial fibrillation, unspecified type ([facility])      fluticasone (FLONASE) 50 mcg/actuation nasal spray 2 Sprays by Both Nostrils route daily. Qty: 1 Bottle, Refills: 12    Associated Diagnoses:  Allergic rhinitis, unspecified allergic rhinitis type         STOP taking these medications       HYDROcodone-acetaminophen (NORCO) 5-325 mg per tablet Comments:   Reason for Stopping:         cephALEXin (KEFLEX) 500 mg capsule Comments:   Reason for Stopping:         OTHER Comments:   Reason for Stopping: meloxicam (MOBIC) 15 mg tablet Comments:   Reason for Stopping:         glimepiride (AMARYL) 4 mg tablet Comments:   Reason for Stopping:         temazepam (RESTORIL) 30 mg capsule Comments:   Reason for Stopping:               Activity: Activity as tolerated  Diet: Diabetic Diet  Wound Care: None needed    Follow-up Appointments   Procedures    FOLLOW UP VISIT Appointment in: One Week PCP     PCP     Standing Status:   Standing     Number of Occurrences:   1     Order Specific Question:   Appointment in     Answer: One Week    FOLLOW UP VISIT Appointment in: 3 - 5 Days Orthopedic surgery. Left humeral fracture     Orthopedic surgery.  Left humeral fracture     Standing Status:   Standing     Number of Occurrences:   1     Standing Expiration Date:   10/6/2017     Order Specific Question:   Appointment in     Answer:   3 - 5 Days       Signed By: Cosme Mejia MD     October 5, 2017

## 2017-10-05 NOTE — PROGRESS NOTES
END OF SHIFT NOTE:    Intake/Output      Voiding: YES  Catheter: NO  Drain:              Stool:  0 occurrences. Stool Assessment  Stool Color: Hernan Plush (10/05/17 0727)  Stool Appearance: Soft (10/05/17 0727)  Stool Amount: Large (10/05/17 0727)  Stool Source/Status: Rectum (10/05/17 0727)    Emesis:  0 occurrences. VITAL SIGNS  Patient Vitals for the past 12 hrs:   Temp Pulse Resp BP SpO2   10/05/17 0727 98.6 °F (37 °C) 70 20 158/74 94 %   10/05/17 0413 98.5 °F (36.9 °C) 64 20 150/79 96 %   10/05/17 0050 98.4 °F (36.9 °C) 62 20 145/78 94 %   10/04/17 2010 98.7 °F (37.1 °C) 69 19 143/75 96 %       Pain Assessment  Pain 1  Pain Scale 1: Visual (10/05/17 0230)  Pain Intensity 1: 0 (10/05/17 0230)  Patient Stated Pain Goal: 0 (10/04/17 0815)  Pain Reassessment 1: Patient sleeping (10/04/17 2100)  Pain Onset 1: pta (10/02/17 0942)  Pain Location 1: Arm; Shoulder (10/04/17 2025)  Pain Orientation 1: Left (10/04/17 2025)  Pain Description 1: Aching (10/04/17 2025)  Pain Intervention(s) 1: Medication (see MAR) (10/04/17 2025)    Ambulating  No    Additional Information: Patient rested well through the night. Uneventful night. Shift report given to oncoming nurse Joi Farias RN at the bedside.     Antonella Marie

## 2017-10-05 NOTE — PROGRESS NOTES
Per Gabriel Herrera [facility]) , patient accepted for tomorrow. Will get a room assignment this afternoon or in the morning.

## 2017-10-05 NOTE — PROGRESS NOTES
Problem: Falls - Risk of  Goal: *Absence of Falls  Document Catrina Fall Risk and appropriate interventions in the flowsheet.    Outcome: Progressing Towards Goal  Fall Risk Interventions:  Mobility Interventions: Patient to call before getting OOB     Mentation Interventions: Adequate sleep, hydration, pain control     Medication Interventions: Patient to call before getting OOB     Elimination Interventions: Call light in reach     History of Falls Interventions: Consult care management for discharge planning

## 2017-10-06 VITALS
TEMPERATURE: 97.9 F | SYSTOLIC BLOOD PRESSURE: 132 MMHG | HEIGHT: 72 IN | DIASTOLIC BLOOD PRESSURE: 66 MMHG | OXYGEN SATURATION: 94 % | WEIGHT: 251.1 LBS | RESPIRATION RATE: 20 BRPM | BODY MASS INDEX: 34.01 KG/M2 | HEART RATE: 76 BPM

## 2017-10-06 LAB
GLUCOSE BLD STRIP.AUTO-MCNC: 224 MG/DL (ref 65–100)
GLUCOSE BLD STRIP.AUTO-MCNC: 277 MG/DL (ref 65–100)

## 2017-10-06 PROCEDURE — 74011250637 HC RX REV CODE- 250/637: Performed by: INTERNAL MEDICINE

## 2017-10-06 PROCEDURE — 74011636637 HC RX REV CODE- 636/637: Performed by: INTERNAL MEDICINE

## 2017-10-06 PROCEDURE — 82962 GLUCOSE BLOOD TEST: CPT

## 2017-10-06 PROCEDURE — 74011250637 HC RX REV CODE- 250/637: Performed by: FAMILY MEDICINE

## 2017-10-06 RX ORDER — INSULIN DEGLUDEC 100 U/ML
15 INJECTION, SOLUTION SUBCUTANEOUS DAILY
Qty: 5 PEN | Refills: 6 | Status: SHIPPED | OUTPATIENT
Start: 2017-10-06 | End: 2018-03-19 | Stop reason: SDUPTHER

## 2017-10-06 RX ADMIN — INSULIN LISPRO 4 UNITS: 100 INJECTION, SOLUTION INTRAVENOUS; SUBCUTANEOUS at 07:50

## 2017-10-06 RX ADMIN — VENLAFAXINE HYDROCHLORIDE 75 MG: 75 CAPSULE, EXTENDED RELEASE ORAL at 06:52

## 2017-10-06 RX ADMIN — INSULIN GLARGINE 15 UNITS: 100 INJECTION, SOLUTION SUBCUTANEOUS at 07:50

## 2017-10-06 RX ADMIN — TRAMADOL HYDROCHLORIDE 50 MG: 50 TABLET, FILM COATED ORAL at 06:52

## 2017-10-06 RX ADMIN — CLONAZEPAM 0.5 MG: 1 TABLET ORAL at 03:07

## 2017-10-06 RX ADMIN — SITAGLIPTIN 100 MG: 100 TABLET, FILM COATED ORAL at 06:53

## 2017-10-06 RX ADMIN — METFORMIN HYDROCHLORIDE 500 MG: 500 TABLET, FILM COATED ORAL at 06:52

## 2017-10-06 RX ADMIN — ACETAMINOPHEN 650 MG: 325 TABLET ORAL at 06:53

## 2017-10-06 RX ADMIN — APIXABAN 5 MG: 2.5 TABLET, FILM COATED ORAL at 06:53

## 2017-10-06 RX ADMIN — AMLODIPINE BESYLATE 5 MG: 5 TABLET ORAL at 06:53

## 2017-10-06 RX ADMIN — INSULIN LISPRO 4 UNITS: 100 INJECTION, SOLUTION INTRAVENOUS; SUBCUTANEOUS at 07:51

## 2017-10-06 RX ADMIN — METOPROLOL TARTRATE 50 MG: 50 TABLET ORAL at 06:53

## 2017-10-06 NOTE — PROGRESS NOTES
Per [address] (admissions) [facility] / patient can go to room #300  / call report to #[phone].

## 2017-10-06 NOTE — PROGRESS NOTES
Problem: Falls - Risk of  Goal: *Absence of Falls  Document Catrina Fall Risk and appropriate interventions in the flowsheet.    Outcome: Progressing Towards Goal  Fall Risk Interventions:  Mobility Interventions: Patient to call before getting OOB     Mentation Interventions: Adequate sleep, hydration, pain control, Door open when patient unattended, Reorient patient, Room close to nurse's station     Medication Interventions: Teach patient to arise slowly, Patient to call before getting OOB     Elimination Interventions: Call light in reach, Patient to call for help with toileting needs     History of Falls Interventions: Evaluate medications/consider consulting pharmacy, Investigate reason for fall, Room close to nurse's station, Door open when patient unattended

## 2017-10-06 NOTE — PROGRESS NOTES
Spoke with patient's daughter Jose Guerin via telephone / aware of room assignment and transfer time for [facility] / [facility] she will call her father and make him aware.

## 2017-10-06 NOTE — PROGRESS NOTES
0645-Bedside report received from [facility]. Resting in bed. No needs voiced. No s/s of acute distress. 0932-TRANSFER - OUT REPORT:    Verbal report given to Pamela Shepherd LPN (name) on Rosalie Jaquez  being transferred to [facility] room 300 for routine progression of care       Report consisted of patients Situation, Background, Assessment and   Recommendations(SBAR). Information from the following report(s) SBAR, Kardex, Intake/Output, MAR and Recent Results was reviewed with the receiving nurse. Lines:       Opportunity for questions and clarification was provided. Patient transported with:   Tech  1028-Pt has no IV access and will be leaving with transport at 1130.   1132-Pt with transport en route to rehab facility.

## 2017-10-06 NOTE — PROGRESS NOTES
Patient will be discharged to SNF and states \" I have already had the flu shot, I don't need it \". Primary RN will call report and remove IV.

## 2017-10-06 NOTE — DISCHARGE SUMMARY
Discharge Summary     Patient: Sriram Elizalde MRN: [id number]  SSN: [id number]    YOB: 1945  Age: 67 y.o. Sex: female       Admit Date: 10/1/2017    Discharge Date: 10/6/2017      Admission Diagnoses: Hypoxia  Metabolic encephalopathy    Discharge Diagnoses:   Problem List as of 10/6/2017  Date Reviewed: 5/7/2017          Codes Class Noted - Resolved    Metabolic encephalopathy JLQ-77-XP: G93.41  ICD-9-CM: 348.31  10/3/2017 - Present        * (Principal)Hypoxia ICD-10-CM: R09.02  ICD-9-CM: 799.02  10/2/2017 - Present    Overview Signed 10/2/2017  2:28 AM by Arsalan Sheriff MD     Probable COPD + obesity             Closed fracture of shaft of left humerus (Chronic) ICD-10-CM: I19.014F  ICD-9-CM: 812.21  10/2/2017 - Present    Overview Signed 10/2/2017  2:00 AM by Arsalan Sheriff MD     Fall with fx on 9/29/17             Morbid obesity ([address] (Chronic) ICD-10-CM: E66.01  ICD-9-CM: 278.01  10/2/2017 - Present        Paroxysmal atrial fibrillation ([address] (Chronic) ICD-10-CM: I48.0  ICD-9-CM: 427.31  6/6/2017 - Present        Pacemaker ICD-10-CM: Z95.0  ICD-9-CM: V45.01  1/13/2017 - Present    Overview Signed 1/13/2017  6:14 PM by Bi Saunders MD     1. Bi[employer]ron[employer] MRI Pacemaker - Jan 2017             Atrial flutter [facility]) (Chronic) ICD-10-CM: I24.11  ICD-9-CM: 427.32  10/21/2016 - Present    Overview Signed 12/22/2016  9:25 AM by Bi Saunders MD     1. Atrial Flutter - Dec 2015 - Ablation of right-sided isthmus-dependent atypical clock-wise atrial flutter.   2. ARACELY Showed [place] Velocity of 25cm/sec             Essential hypertension (Chronic) ICD-10-CM: I10  ICD-9-CM: 401.9  10/21/2016 - Present        H/O breast implant ICD-10-CM: Z98.82  ICD-9-CM: V43.82  4/17/2013 - Present        Tobacco abuse (Chronic) ICD-10-CM: Z72.0  ICD-9-CM: 305.1  4/17/2013 - Present        Chronic bronchitis (HCC) (Chronic) ICD-10-CM: C25  ICD-9-CM: 491.9  4/17/2013 - Present        Anxiety ICD-10-CM: F41.9  ICD-9-CM: 300.00  4/17/2013 - Present        Insomnia ICD-10-CM: G47.00  ICD-9-CM: 780.52  4/17/2013 - Present        GERD (gastroesophageal reflux disease) (Chronic) ICD-10-CM: K21.9  ICD-9-CM: 530.81  4/17/2013 - Present    Overview Signed 4/17/2013  4:27 PM by Alyssa Lynn LPN     chronic             Hyperlipidemia ICD-10-CM: E78.5  ICD-9-CM: 272.4  4/17/2013 - Present        DM type 2 (diabetes mellitus, type 2) (HCC) (Chronic) ICD-10-CM: E11.9  ICD-9-CM: 250.00  4/17/2013 - Present        RESOLVED: Bradycardia ICD-10-CM: R00.1  ICD-9-CM: 427.89  1/12/2017 - 10/2/2017        RESOLVED: Non Hodgkin's lymphoma ([facility] ICD-10-CM: C85.90  ICD-9-CM: 202.80  4/17/2013 - 10/2/2017        RESOLVED: history of PUD (peptic ulcer disease) ICD-10-CM: K27.9  ICD-9-CM: 533.90  4/17/2013 - 10/2/2017               Discharge Condition: [facility] [place] operated by [facility] Course:     (updated discharge summary)     Patient is a 67 y. o. female with a PMH of DM type II, HTN, Afib on anticoagulation with eliquis  who presented to the ER for the first time on 9/27 after she tripped and fell on her left arm. She was diagnosed with a fracture of the left proximal humerus, she received pain meds and was sent to see ortho as an outpatient. She came back on 10/1  due to arm pain and AMS with hallucinations per family.   Family reported that she was confused and hearing family members who were not in the house.  ER workup showed her to be hypoxic on room air.  Pt denied any prior lung diseases.  She did smoke for many years (52 pack years), but quit 5 years ago. Her chest x ray was read as normal, but further evaluation showed hyperinflated lungs consistent with COPD/emphysema. She remained confused for 48 h but eventually her mental status improved. Most likely she had delirium due to a combination of pain, narcotic use and benzos and sedatives used at home ( was on ambien and clonazepam prn).  She was requiring supplementary O2 but yesterday it was tapered off . She should continue using the incentive spirometer on a daily basis. She is very fragile and it has been considered she will benefit from inpatient rehab. We think a combination of insulin and sulfonilureas could be too risky at her age ( she was using glimepiride and insulin at home). She is being discharged on long acting insulin + metformin+ [place] and [place]. Please follow BS levels. She will need to be seen by Ortho in the next days after being discharged.      GENERAL: alert,  appears stated age  EYE: conjunctivae/corneas clear. PERRL, EOM's intact. Fundi benign  LYMPHATIC: Cervical, supraclavicular, and axillary nodes normal.   THROAT & NECK: normal and no erythema or exudates noted. LUNG: clear to auscultation bilaterally  HEART: regular rate and rhythm, S1, S2 normal, no murmur, click, rub or gallop  ABDOMEN: soft, non-tender. Bowel sounds normal. No masses,  no organomegaly  EXTREMITIES:  left arm in sling. Ecchymosis. SKIN: multiple ecchymosis on the left arm and leg   NEUROLOGIC: non focal   PSYCHIATRIC: alert        Consults: None    Significant Diagnostic Studies: see hospital course     Disposition: inpatient rehab     Discharge Medications:   Current Discharge Medication List      START taking these medications    Details   amLODIPine (NORVASC) 5 mg tablet Take 1 Tab by mouth daily (after breakfast). Qty: 30 Tab, Refills: 0      insulin lispro (HUMALOG) 100 unit/mL injection humalog SS TID before meals  Qty: 1 Vial, Refills: 0      SITagliptin (JANUVIA) 100 mg tablet Take 1 Tab by mouth daily. Qty: 30 Tab, Refills: 0      traMADol (ULTRAM) 50 mg tablet Take 1 Tab by mouth every eight (8) hours as needed. Max Daily Amount: 150 mg.  Qty: 30 Tab, Refills: 0         CONTINUE these medications which have CHANGED    Details   insulin degludec (TRESIBA FLEXTOUCH U-100) 100 unit/mL (3 mL) inpn 15 Units by SubCUTAneous route daily.   Qty: 5 Pen, Refills: 6    Associated Diagnoses: Type 2 diabetes mellitus without complication, without long-term current use of insulin ([facility])      polyethylene glycol (MIRALAX) 17 gram/dose powder Take 17 g by mouth daily as needed. 1 tablespoon with 8 oz of water daily  Qty: 505 g, Refills: 0      clonazePAM (KLONOPIN) 0.5 mg tablet Take 1 Tab by mouth two (2) times daily as needed. Max Daily Amount: 1 mg. For anxiety  Qty: 15 Tab, Refills: 0      metFORMIN (GLUCOPHAGE) 500 mg tablet Take 1 Tab by mouth two (2) times daily (with meals). Qty: 60 Tab, Refills: 0         CONTINUE these medications which have NOT CHANGED    Details   ondansetron (ZOFRAN ODT) 4 mg disintegrating tablet Take 1 Tab by mouth every eight (8) hours as needed for Nausea. Qty: 11 Tab, Refills: 1      metoprolol tartrate (LOPRESSOR) 50 mg tablet Take 1 Tab by mouth two (2) times a day. Qty: 60 Tab, Refills: 6    Associated Diagnoses: Bradycardia      Insulin Needles, Disposable, (RELION PEN NEEDLES) 32 gauge x 5/32\" ndle Use daily with insulin  Qty: 100 Pen Needle, Refills: 6    Associated Diagnoses: Type 2 diabetes mellitus without complication, without long-term current use of insulin ([facility])      pravastatin (PRAVACHOL) 40 mg tablet Take 2 Tabs by mouth nightly. Qty: 180 Tab, Refills: 3    Associated Diagnoses: Mixed hyperlipidemia      venlafaxine-SR (EFFEXOR-XR) 75 mg capsule Take 1 Cap by mouth daily. Qty: 90 Cap, Refills: 3      apixaban (ELIQUIS) 5 mg tablet Take 1 Tab by mouth two (2) times a day. Qty: 14 Tab, Refills: 0    Associated Diagnoses: Atrial fibrillation, unspecified type ([facility])      fluticasone (FLONASE) 50 mcg/actuation nasal spray 2 Sprays by Both Nostrils route daily. Qty: 1 Bottle, Refills: 12    Associated Diagnoses:  Allergic rhinitis, unspecified allergic rhinitis type         STOP taking these medications       HYDROcodone-acetaminophen (NORCO) 5-325 mg per tablet Comments:   Reason for Stopping:         cephALEXin (KEFLEX) 500 mg capsule Comments:   Reason for Stopping: OTHER Comments:   Reason for Stopping:         meloxicam (MOBIC) 15 mg tablet Comments:   Reason for Stopping:         glimepiride (AMARYL) 4 mg tablet Comments:   Reason for Stopping:         temazepam (RESTORIL) 30 mg capsule Comments:   Reason for Stopping:               Activity: Activity as tolerated  Diet: Diabetic Diet  Wound Care: None needed    Follow-up Appointments   Procedures    FOLLOW UP VISIT Appointment in: One Week PCP     PCP     Standing Status:   Standing     Number of Occurrences:   1     Order Specific Question:   Appointment in     Answer: One Week    FOLLOW UP VISIT Appointment in: 3 - 5 Days Orthopedic surgery. Left humeral fracture     Orthopedic surgery.  Left humeral fracture     Standing Status:   Standing     Number of Occurrences:   1     Standing Expiration Date:   10/6/2017     Order Specific Question:   Appointment in     Answer:   3 - 5 Days       Signed By: Celine Crook MD     October 6, 2017

## 2017-10-07 ENCOUNTER — PATIENT OUTREACH (OUTPATIENT)
Dept: CASE MANAGEMENT | Age: 72
End: 2017-10-07

## 2017-10-07 NOTE — PROGRESS NOTES
Per chart review patient was discharged to SNF following hospital visit. HUMBERTO care coordinator will f/u with patient in 21 days for CANDELARIA [place] outreach. This note will not be viewable in [address]

## 2017-12-18 PROBLEM — E11.21 TYPE 2 DIABETES MELLITUS WITH NEPHROPATHY (HCC): Status: ACTIVE | Noted: 2017-12-18

## 2018-01-19 ENCOUNTER — HOSPITAL ENCOUNTER (OUTPATIENT)
Dept: INTERVENTIONAL RADIOLOGY/VASCULAR | Age: 73
Discharge: HOME | End: 2018-01-19
Attending: ORTHOPAEDIC SURGERY

## 2018-02-19 ENCOUNTER — HOSPITAL ENCOUNTER (OUTPATIENT)
Dept: CT IMAGING | Age: 73
Discharge: HOME | End: 2018-02-19
Attending: FAMILY MEDICINE
Payer: MEDICARE

## 2018-02-19 DIAGNOSIS — R41.82 ALTERED MENTAL STATUS, UNSPECIFIED ALTERED MENTAL STATUS TYPE: ICD-10-CM

## 2018-02-19 DIAGNOSIS — R51.9 NONINTRACTABLE EPISODIC HEADACHE, UNSPECIFIED HEADACHE TYPE: ICD-10-CM

## 2018-02-19 PROCEDURE — 70450 CT HEAD/BRAIN W/O DYE: CPT

## 2018-05-07 ENCOUNTER — HOSPITAL ENCOUNTER (OUTPATIENT)
Dept: SURGERY | Age: 73
Discharge: HOME | End: 2018-05-07
Payer: MEDICARE

## 2018-05-07 VITALS
BODY MASS INDEX: 33.32 KG/M2 | TEMPERATURE: 97.6 F | SYSTOLIC BLOOD PRESSURE: 155 MMHG | HEART RATE: 70 BPM | WEIGHT: 238 LBS | HEIGHT: 71 IN | OXYGEN SATURATION: 94 % | DIASTOLIC BLOOD PRESSURE: 85 MMHG | RESPIRATION RATE: 16 BRPM

## 2018-05-07 LAB — GLUCOSE BLD STRIP.AUTO-MCNC: 127 MG/DL (ref 65–100)

## 2018-05-07 PROCEDURE — 82962 GLUCOSE BLOOD TEST: CPT

## 2018-05-07 RX ORDER — GLUCOSAMINE SULFATE 1500 MG
1000 POWDER IN PACKET (EA) ORAL DAILY
COMMUNITY

## 2018-05-07 NOTE — PERIOP NOTES
Patient verified name, , and surgery as listed in [facility]. Patient provided medical/health information and PTA medications to the best of their ability. Pt is a poor historian. She did not know pertinent information. TYPE  CASE:1B  Orders per surgeon: Received and dated. Labs per surgeon: none  Labs per anesthesia protocol: POC glucose  EKG  :  See note- not needed per Dr Alicia Sanchez    Patient provided with and instructed on education handouts including Guide to Surgery, blood transfusions, pain management, and hand hygiene for the family and community, and [facility] brochure. preop and instructions given per hospital policy. Instructed patient to continue previous medications as prescribed prior to surgery unless otherwise directed and to take the following medications the day of surgery according to anesthesia guidelines : norvasc, lopressor, ultram prn, effexor, and to decrease pm insulin Tresiba to 80% usual dose- 9 units. Instructed patient to hold  the following medications: Eliquis- last dose 5//18 pm (tonight), fish oil, vitamin D3. Original medication prescription bottles not visualized during patient appointment. Patient teach back successful and patient demonstrates knowledge of instruction.

## 2018-05-07 NOTE — PERIOP NOTES
12- notified Dr Brisa Huitron of pt's surgery, last A1C= 9.2 in Jan '18, has 48 yr hx of smoking- quit 2008, last EKG in Jan 2017 showed \"possible anteroseptal infarct\"- confirmed by cardiologist.    OK to use that EKG for this surgery per Dr Brisa Huitron.

## 2018-05-07 NOTE — PERIOP NOTES
Case is scheduled for 1320- left message on Marybeth's line, medical assistant for the surgeon, to see if she could move pt to the morning (as pt and  said they were suppose to be scheduled) in light of pt being an insulin dependent diabetic.   Requested that she call back pt

## 2018-05-09 ENCOUNTER — ANESTHESIA EVENT (OUTPATIENT)
Dept: SURGERY | Age: 73
End: 2018-05-09
Payer: MEDICARE

## 2018-05-09 RX ORDER — ONDANSETRON 2 MG/ML
4 INJECTION INTRAMUSCULAR; INTRAVENOUS
Status: CANCELLED | OUTPATIENT
Start: 2018-05-09

## 2018-05-09 RX ORDER — SODIUM CHLORIDE 0.9 % (FLUSH) 0.9 %
5-10 SYRINGE (ML) INJECTION AS NEEDED
Status: CANCELLED | OUTPATIENT
Start: 2018-05-09

## 2018-05-09 RX ORDER — ALBUTEROL SULFATE 0.83 MG/ML
2.5 SOLUTION RESPIRATORY (INHALATION) AS NEEDED
Status: CANCELLED | OUTPATIENT
Start: 2018-05-09

## 2018-05-09 RX ORDER — HYDROMORPHONE HYDROCHLORIDE 2 MG/ML
0.5 INJECTION, SOLUTION INTRAMUSCULAR; INTRAVENOUS; SUBCUTANEOUS
Status: CANCELLED | OUTPATIENT
Start: 2018-05-09

## 2018-05-10 ENCOUNTER — HOSPITAL ENCOUNTER (OUTPATIENT)
Age: 73
Setting detail: OUTPATIENT SURGERY
Discharge: HOME | End: 2018-05-10
Attending: ORTHOPAEDIC SURGERY | Admitting: ORTHOPAEDIC SURGERY
Payer: MEDICARE

## 2018-05-10 ENCOUNTER — ANESTHESIA (OUTPATIENT)
Dept: SURGERY | Age: 73
End: 2018-05-10
Payer: MEDICARE

## 2018-05-10 ENCOUNTER — APPOINTMENT (OUTPATIENT)
Dept: GENERAL RADIOLOGY | Age: 73
End: 2018-05-10
Attending: ORTHOPAEDIC SURGERY
Payer: MEDICARE

## 2018-05-10 VITALS
TEMPERATURE: 97.4 F | WEIGHT: 238 LBS | BODY MASS INDEX: 33.19 KG/M2 | HEART RATE: 70 BPM | SYSTOLIC BLOOD PRESSURE: 181 MMHG | RESPIRATION RATE: 16 BRPM | DIASTOLIC BLOOD PRESSURE: 87 MMHG | OXYGEN SATURATION: 92 %

## 2018-05-10 LAB — GLUCOSE BLD STRIP.AUTO-MCNC: 127 MG/DL (ref 65–100)

## 2018-05-10 PROCEDURE — 77030002916 HC SUT ETHLN J&J -A: Performed by: ORTHOPAEDIC SURGERY

## 2018-05-10 PROCEDURE — C1713 ANCHOR/SCREW BN/BN,TIS/BN: HCPCS | Performed by: ORTHOPAEDIC SURGERY

## 2018-05-10 PROCEDURE — 77030025281 HC SPLNT ORTHGLS 1 BSNM -B: Performed by: ORTHOPAEDIC SURGERY

## 2018-05-10 PROCEDURE — 74011250636 HC RX REV CODE- 250/636: Performed by: ANESTHESIOLOGY

## 2018-05-10 PROCEDURE — 77030002933 HC SUT MCRYL J&J -A: Performed by: ORTHOPAEDIC SURGERY

## 2018-05-10 PROCEDURE — 76942 ECHO GUIDE FOR BIOPSY: CPT | Performed by: ORTHOPAEDIC SURGERY

## 2018-05-10 PROCEDURE — 76210000026 HC REC RM PH II 1 TO 1.5 HR: Performed by: ORTHOPAEDIC SURGERY

## 2018-05-10 PROCEDURE — 74011250636 HC RX REV CODE- 250/636: Performed by: ORTHOPAEDIC SURGERY

## 2018-05-10 PROCEDURE — 77030006788 HC BLD SAW OSC STRY -B: Performed by: ORTHOPAEDIC SURGERY

## 2018-05-10 PROCEDURE — 77030000032 HC CUF TRNQT ZIMM -B: Performed by: ORTHOPAEDIC SURGERY

## 2018-05-10 PROCEDURE — 76010000160 HC OR TIME 0.5 TO 1 HR INTENSV-TIER 1: Performed by: ORTHOPAEDIC SURGERY

## 2018-05-10 PROCEDURE — 74011250637 HC RX REV CODE- 250/637: Performed by: ANESTHESIOLOGY

## 2018-05-10 PROCEDURE — 77030018836 HC SOL IRR NACL ICUM -A: Performed by: ORTHOPAEDIC SURGERY

## 2018-05-10 PROCEDURE — 94640 AIRWAY INHALATION TREATMENT: CPT

## 2018-05-10 PROCEDURE — 82962 GLUCOSE BLOOD TEST: CPT

## 2018-05-10 PROCEDURE — 74011250636 HC RX REV CODE- 250/636

## 2018-05-10 PROCEDURE — 76010010054 HC POST OP PAIN BLOCK: Performed by: ORTHOPAEDIC SURGERY

## 2018-05-10 PROCEDURE — 77030003862 HC BIT DRL SYNT -B: Performed by: ORTHOPAEDIC SURGERY

## 2018-05-10 PROCEDURE — 74011000250 HC RX REV CODE- 250: Performed by: ANESTHESIOLOGY

## 2018-05-10 PROCEDURE — 76210000063 HC OR PH I REC FIRST 0.5 HR: Performed by: ORTHOPAEDIC SURGERY

## 2018-05-10 PROCEDURE — C1769 GUIDE WIRE: HCPCS | Performed by: ORTHOPAEDIC SURGERY

## 2018-05-10 PROCEDURE — 74011000250 HC RX REV CODE- 250

## 2018-05-10 PROCEDURE — 76060000032 HC ANESTHESIA 0.5 TO 1 HR: Performed by: ORTHOPAEDIC SURGERY

## 2018-05-10 PROCEDURE — 77030011640 HC PAD GRND REM COVD -A: Performed by: ORTHOPAEDIC SURGERY

## 2018-05-10 PROCEDURE — 77030003602 HC NDL NRV BLK BBMI -B: Performed by: ANESTHESIOLOGY

## 2018-05-10 DEVICE — IMPLANTABLE DEVICE: Type: IMPLANTABLE DEVICE | Site: FOOT | Status: FUNCTIONAL

## 2018-05-10 DEVICE — SCREW BNE L30MM DIA4MM CANC TI SELF DRL ST CANN LOK SHT: Type: IMPLANTABLE DEVICE | Site: FOOT | Status: FUNCTIONAL

## 2018-05-10 RX ORDER — BUPIVACAINE HYDROCHLORIDE 5 MG/ML
INJECTION, SOLUTION EPIDURAL; INTRACAUDAL AS NEEDED
Status: DISCONTINUED | OUTPATIENT
Start: 2018-05-10 | End: 2018-05-10 | Stop reason: HOSPADM

## 2018-05-10 RX ORDER — FENTANYL CITRATE 50 UG/ML
INJECTION, SOLUTION INTRAMUSCULAR; INTRAVENOUS AS NEEDED
Status: DISCONTINUED | OUTPATIENT
Start: 2018-05-10 | End: 2018-05-10 | Stop reason: HOSPADM

## 2018-05-10 RX ORDER — PROPOFOL 10 MG/ML
INJECTION, EMULSION INTRAVENOUS
Status: DISCONTINUED | OUTPATIENT
Start: 2018-05-10 | End: 2018-05-10 | Stop reason: HOSPADM

## 2018-05-10 RX ORDER — PROPOFOL 10 MG/ML
INJECTION, EMULSION INTRAVENOUS AS NEEDED
Status: DISCONTINUED | OUTPATIENT
Start: 2018-05-10 | End: 2018-05-10 | Stop reason: HOSPADM

## 2018-05-10 RX ORDER — METOPROLOL TARTRATE 50 MG/1
50 TABLET ORAL ONCE
Status: COMPLETED | OUTPATIENT
Start: 2018-05-10 | End: 2018-05-10

## 2018-05-10 RX ORDER — LIDOCAINE HYDROCHLORIDE 10 MG/ML
0.1 INJECTION INFILTRATION; PERINEURAL AS NEEDED
Status: DISCONTINUED | OUTPATIENT
Start: 2018-05-10 | End: 2018-05-10 | Stop reason: HOSPADM

## 2018-05-10 RX ORDER — ALBUTEROL SULFATE 0.83 MG/ML
2.5 SOLUTION RESPIRATORY (INHALATION)
Status: COMPLETED | OUTPATIENT
Start: 2018-05-10 | End: 2018-05-10

## 2018-05-10 RX ORDER — LIDOCAINE HYDROCHLORIDE 20 MG/ML
INJECTION, SOLUTION EPIDURAL; INFILTRATION; INTRACAUDAL; PERINEURAL AS NEEDED
Status: DISCONTINUED | OUTPATIENT
Start: 2018-05-10 | End: 2018-05-10 | Stop reason: HOSPADM

## 2018-05-10 RX ORDER — SODIUM CHLORIDE, SODIUM LACTATE, POTASSIUM CHLORIDE, CALCIUM CHLORIDE 600; 310; 30; 20 MG/100ML; MG/100ML; MG/100ML; MG/100ML
1000 INJECTION, SOLUTION INTRAVENOUS CONTINUOUS
Status: DISCONTINUED | OUTPATIENT
Start: 2018-05-10 | End: 2018-05-10 | Stop reason: HOSPADM

## 2018-05-10 RX ORDER — ACETAMINOPHEN 500 MG
1000 TABLET ORAL ONCE
Status: COMPLETED | OUTPATIENT
Start: 2018-05-10 | End: 2018-05-10

## 2018-05-10 RX ORDER — CEFAZOLIN SODIUM/WATER 2 G/20 ML
2 SYRINGE (ML) INTRAVENOUS
Status: COMPLETED | OUTPATIENT
Start: 2018-05-10 | End: 2018-05-10

## 2018-05-10 RX ORDER — SODIUM CHLORIDE 0.9 % (FLUSH) 0.9 %
5-10 SYRINGE (ML) INJECTION AS NEEDED
Status: DISCONTINUED | OUTPATIENT
Start: 2018-05-10 | End: 2018-05-10 | Stop reason: HOSPADM

## 2018-05-10 RX ORDER — SODIUM CHLORIDE 0.9 % (FLUSH) 0.9 %
5-10 SYRINGE (ML) INJECTION EVERY 8 HOURS
Status: DISCONTINUED | OUTPATIENT
Start: 2018-05-10 | End: 2018-05-10 | Stop reason: HOSPADM

## 2018-05-10 RX ADMIN — METOPROLOL TARTRATE 50 MG: 50 TABLET ORAL at 06:50

## 2018-05-10 RX ADMIN — PROPOFOL 20 MG: 10 INJECTION, EMULSION INTRAVENOUS at 07:15

## 2018-05-10 RX ADMIN — PROPOFOL 50 MCG/KG/MIN: 10 INJECTION, EMULSION INTRAVENOUS at 07:15

## 2018-05-10 RX ADMIN — FENTANYL CITRATE 25 MCG: 50 INJECTION, SOLUTION INTRAMUSCULAR; INTRAVENOUS at 07:15

## 2018-05-10 RX ADMIN — FENTANYL CITRATE 25 MCG: 50 INJECTION, SOLUTION INTRAMUSCULAR; INTRAVENOUS at 07:45

## 2018-05-10 RX ADMIN — SODIUM CHLORIDE, SODIUM LACTATE, POTASSIUM CHLORIDE, AND CALCIUM CHLORIDE 1000 ML: 600; 310; 30; 20 INJECTION, SOLUTION INTRAVENOUS at 07:07

## 2018-05-10 RX ADMIN — ALBUTEROL SULFATE 2.5 MG: 2.5 SOLUTION RESPIRATORY (INHALATION) at 07:05

## 2018-05-10 RX ADMIN — FENTANYL CITRATE 25 MCG: 50 INJECTION, SOLUTION INTRAMUSCULAR; INTRAVENOUS at 07:30

## 2018-05-10 RX ADMIN — ACETAMINOPHEN 1000 MG: 500 TABLET, FILM COATED ORAL at 06:00

## 2018-05-10 RX ADMIN — BUPIVACAINE HYDROCHLORIDE 30 ML: 5 INJECTION, SOLUTION EPIDURAL; INTRACAUDAL at 07:02

## 2018-05-10 RX ADMIN — FENTANYL CITRATE 25 MCG: 50 INJECTION, SOLUTION INTRAMUSCULAR; INTRAVENOUS at 07:53

## 2018-05-10 RX ADMIN — LIDOCAINE HYDROCHLORIDE 80 MG: 20 INJECTION, SOLUTION EPIDURAL; INFILTRATION; INTRACAUDAL; PERINEURAL at 07:15

## 2018-05-10 RX ADMIN — LIDOCAINE HYDROCHLORIDE 20 MG: 20 INJECTION, SOLUTION EPIDURAL; INFILTRATION; INTRACAUDAL; PERINEURAL at 07:39

## 2018-05-10 RX ADMIN — Medication 2 G: at 07:13

## 2018-05-10 NOTE — ANESTHESIA PROCEDURE NOTES
Peripheral Block    Start time: 5/10/2018 6:52 AM  End time: 5/10/2018 6:59 AM  Performed by: Jean Carlos Ellis  Authorized by: Jean Carlos Ellis       Pre-procedure:    Indications: at surgeon's request and post-op pain management    Preanesthetic Checklist: patient identified, risks and benefits discussed, site marked, timeout performed, anesthesia consent given and patient being monitored    Timeout Time: 06:52          Block Type:   Block Type:  Popliteal  Laterality:  Left  Monitoring:  Continuous pulse ox, frequent vital sign checks, heart rate, oxygen and responsive to questions  Injection Technique:  Single shot  Procedures: ultrasound guided    Prep: chlorhexidine    Location:  Lower thigh  Needle Type:  Stimuplex  Needle Gauge:  20 G  Needle Localization:  Ultrasound guidance  Medication Injected:  0.5%  bupivacaine  Volume (mL):  18    Assessment:  Number of attempts:  1  Injection Assessment:  Incremental injection every 5 mL, local visualized surrounding nerve on ultrasound, negative aspiration for blood, no intravascular symptoms, no paresthesia and ultrasound image on chart  Patient tolerance:  Patient tolerated the procedure well with no immediate complications

## 2018-05-10 NOTE — ANESTHESIA PROCEDURE NOTES
Peripheral Block    Start time: 5/10/2018 6:59 AM  End time: 5/10/2018 7:02 AM  Performed by: Von Crocker  Authorized by: Von Crocker       Pre-procedure: Indications: at surgeon's request and post-op pain management    Preanesthetic Checklist: patient identified, risks and benefits discussed, site marked, timeout performed, anesthesia consent given and patient being monitored    Timeout Time: 06:59          Block Type:   Block Type:   Adductor canal  Laterality:  Left  Monitoring:  Continuous pulse ox, frequent vital sign checks, heart rate, oxygen and responsive to questions  Injection Technique:  Single shot  Procedures: ultrasound guided    Patient Position: supine  Prep: chlorhexidine    Location:  Mid thigh  Needle Gauge:  20 G  Needle Localization:  Ultrasound guidance  Medication Injected:  0.5%  bupivacaine  Volume (mL):  12    Assessment:  Number of attempts:  1  Injection Assessment:  Incremental injection every 5 mL, local visualized surrounding nerve on ultrasound, negative aspiration for blood, no intravascular symptoms, no paresthesia and ultrasound image on chart  Patient tolerance:  Patient tolerated the procedure well with no immediate complications

## 2018-05-10 NOTE — OP NOTES
[facility] REPORT    Name:Doug CORREA  MR#: [id number]  : 1945  ACCOUNT #: [de-identified]   DATE OF SERVICE: 05/10/2018    PREOPERATIVE DIAGNOSIS:  Left unstable tarsometatarsal joint dislocation. POSTOPERATIVE DIAGNOSIS:  Left unstable tarsometatarsal joint dislocation. PROCEDURES PERFORMED:   1. Open reduction and internal fixation of left 1st and 2nd tarsometatarsal joint injuries. 2.  Primary arthrodesis of left 1st and 2nd tarsometatarsal joints. SURGEON:  Elena Concepcion MD         ANESTHESIA:  Popliteal block and monitored anesthesia care. ESTIMATED BLOOD LOSS:  Minimal.    TOURNIQUET TIME:  31 minutes at 250 mmHg. ANTIBIOTIC PROPHYLAXIS:  Ancef given prior to procedure. INDICATION:  The patient is a 77-year-old white female who sustained a left unstable mid foot injury, presents today for operative fixation. Risks and benefits of procedure including but not limited to the risks of anesthetic complications of myocardial infarction, stroke and death, as well as surgical complications to include damage to nerves and blood vessels, risk for infection, incomplete pain relief, and need for additional surgery have been discussed with the patient. She understands the risks and wishes to proceed with surgery at this time. DETAILS OF PROCEDURE:  The patient's correct operative site was marked with indelible ink in the preoperative holding area. A block was placed by the department of anesthesia. The patient was taken to the operating room and placed supine. After a preoperative surgical timeout, the left lower extremity was identified as the correct surgical site, prepped and draped in standard sterile fashion with ChloraPrep solution. A dorsal approach to the 1st and 2nd tarsometatarsal joints was then performed at that time, taking care to protect the neurovascular bundle.   Instability in the 1st and 2nd TMT joints was identified. There was a significant fracture through the cartilage of the first TMT joint. Therefore, it was elected to proceed with arthrodesis at that time. Both the 1st and 2nd tarsometatarsal joints were repaired using curette followed by an osteotome and brought into desired clinical alignment. Both joints were then fixed using a dorsal plate of the first TMT joint and cannulated screws on the second tarsometatarsal joint at that time as well, which were noted to be adequately placed under image intensification. Wounds were then irrigated and closed using Monocryl and nylon sutures. Sterile dressings were then applied followed by well-padded posterior splint. Anesthesia was discontinued. The patient was subsequently transferred out of recovery bed to the recovery room in satisfactory condition. She appeared to tolerate the procedure well. There were no apparent surgical or anesthetic complications. All needle and sponge counts were correct.       MD Meghan Coy / Magdalena Louis  D: 05/10/2018 14:06     T: 05/10/2018 15:37  JOB #: [id number]

## 2018-05-10 NOTE — ANESTHESIA POSTPROCEDURE EVALUATION
Post-Anesthesia Evaluation and Assessment    Patient: David Queen MRN: [id number]  SSN: [id number]    YOB: 1945  Age: 68 y.o. Sex: female       Cardiovascular Function/Vital Signs  Visit Vitals    /83    Pulse 69    Temp 36.3 °C (97.4 °F)    Resp 16    Wt 108 kg (238 lb)    SpO2 93%    BMI 33.19 kg/m2       Patient is status post total IV anesthesia anesthesia for Procedure(s):  LEFT 1ST AND 2ND TARSOMETATARSAL OPEN REDUCTION INTERNAL FIXATION AND FUSION. Nausea/Vomiting: None    Postoperative hydration reviewed and adequate. Pain:  Pain Scale 1: Numeric (0 - 10) (05/10/18 0722)  Pain Intensity 1: 8 (05/10/18 0702)   Managed    Neurological Status:   Neuro (WDL): Within Defined Limits (05/10/18 0616)   At baseline    Mental Status and Level of Consciousness: Arousable    Pulmonary Status:   O2 Device: Nasal cannula (05/10/18 0811)   Adequate oxygenation and airway patent    Complications related to anesthesia: None    Post-anesthesia assessment completed.  No concerns    Signed By: Eryn Jalloh MD     May 10, 2018

## 2018-05-10 NOTE — BRIEF OP NOTE
BRIEF OPERATIVE NOTE    Date of Procedure: 5/10/2018   Preoperative Diagnosis: Dislocation of tarsometatarsal joint of left foot, initial encounter [S93.325A]  Postoperative Diagnosis: Dislocation of tarsometatarsal joint of left foot    Procedure(s):  LEFT 1ST AND 2ND TARSOMETATARSAL OPEN REDUCTION INTERNAL FIXATION AND FUSION  Surgeon(s) and Role:     * Ilan Roger MD - Primary         Surgical Assistant: no    Surgical Staff:  Circ-1: Marylen Crofts. [place], RN  Radiology Technician: Andree Linder, RT, R, CT  Scrub Tech-1: Clearance [place]  Event Time In   Incision Start 0725   Incision Close 0754     Anesthesia: Regional   Estimated Blood Loss: no  Specimens: * No specimens in log *   Findings: no  Complications: no  Implants:   Implant Name Type Inv.  Item Serial No.  Lot No. LRB No. Used Action   PLATE BNE 1/3 TBLR 2H 25 TI --  - [id number]  PLATE BNE 1/3 TBLR 2H 25 TI --   [employer] [place] [id number] Left 1 Implanted   SCR BNE CANC FT 4X28MM TI --  - [id number]  SCR BNE CANC FT 4X28MM TI --   [employer] [place] [id number] Left 1 Implanted   SCR BNE CANC FT 4X26MM TI --  - [id number]  SCR BNE CANC FT 4X26MM TI --   [facility] Left 1 Implanted   SCR [facility]STIAN[facility] THRD 4X26 TI --  - [id number]  SCR CALVIN [facility] THRD 4X26 TI --   [facility] Left 1 Implanted   SCR CALVIN [employer] THRD 4X30 TI --  - [id number]   [facility]    [employer] [place] [id number] Left 1 Implanted

## 2018-05-10 NOTE — ANESTHESIA PREPROCEDURE EVALUATION
Anesthetic History   No history of anesthetic complications            Review of Systems / Medical History  Patient summary reviewed and pertinent labs reviewed    Pulmonary    COPD      Smoker (Former smoker - quit 5 years ago)      Comments: Resting O2 in high 80s, patient report her breathing to be normal.   Neuro/Psych   Within defined limits           Cardiovascular    Hypertension        Dysrhythmias : atrial flutter and atrial fibrillation      Exercise tolerance: <4 METS  Comments: Echo 11/2016 - normal LVEF   GI/Hepatic/Renal     GERD: well controlled      PUD     Endo/Other    Diabetes: well controlled, type 2  Hypothyroidism: well controlled  Obesity and arthritis     Other Findings   Comments: H/o DVT and PE (hospitalzied for 1 month) over 15 yrs ago             Physical Exam    Airway  Mallampati: III  TM Distance: < 4 cm  Neck ROM: normal range of motion   Mouth opening: Normal     Cardiovascular    Rhythm: irregular  Rate: normal      Pertinent negatives: No murmur, JVD and peripheral edema   Dental    Dentition: Lower dentition intact and Upper partial plate     Pulmonary        Wheezes         Abdominal  GI exam deferred       Other Findings            Anesthetic Plan    ASA: 3  Anesthesia type: total IV anesthesia      Post-op pain plan if not by surgeon: peripheral nerve block single    Induction: Intravenous  Anesthetic plan and risks discussed with: Patient      Pop-saph TIVA  With pre-op albuterol

## 2018-05-10 NOTE — PERIOP NOTES
Notified Dr Rebecca Porras of pt low O2 Sat and pt did not take Metoprolol. Received orders for Metoprolol 50 mg po now and Albuterol nebulizer now. Called and notified respiratory therapy about nebulizer.

## 2018-05-10 NOTE — IP AVS SNAPSHOT
[address] [place] [address] 
[phone] Patient: Christine Easley MRN: [id number] RVO:6/0/1521 About your hospitalization You were admitted on:  May 10, 2018 You last received care in the:  [facility] OP PACU You were discharged on:  May 10, 2018 Why you were hospitalized Your primary diagnosis was:  Not on File Follow-up Information None Your Scheduled Appointments Wednesday June 06, 2018  1:00 PM EDT Office Visit with Prisca Beltre MD  
[facility] CARDIOLOGY WILL OFFICE ([address] [place] [address]) [address] [place] [address]  
[phone] Wednesday June 13, 2018  2:00 PM EDT Office Visit with Lonny Downs MD  
[facility] ([address] [place] [address]) [place] [phone] Bring all medications, insurance and prescription cards and please be prepared to pay any copay that may be due. Tuesday June 26, 2018  9:40 AM EDT  
OFFICE DEVICE CHECKS with WILL/ROMULO DEVICE [facility] CARDIOLOGY WILL OFFICE ([address] [place] [address]) [address] [place] [address]  
[phone] This upcoming device check will take place IN OUR OFFICE. Please arrive 15 minutes early to review any necessary paperwork requirements. If you have any further questions or need to change this appointment, we are happy to help and can be reached at [phone]. Discharge Orders None A check mary indicates which time of day the medication should be taken. My Medications ASK your doctor about these medications Instructions Each Dose to Equal  
 Morning Noon Evening Bedtime  
 amLODIPine 5 mg tablet Commonly known as:  Radames Edward Your last dose was: Your next dose is: Take 1 Tab by mouth daily (after breakfast). 5 mg apixaban 5 mg tablet Commonly known as:  Jonny Garcia Your last dose was: Your next dose is: Take 1 Tab by mouth two (2) times a day. 5 mg  
    
   
   
   
  
 clonazePAM 2 mg tablet Commonly known as:  Mulugeta Larios Your last dose was: Your next dose is: Take 1 Tab by mouth nightly. Max Daily Amount: 2 mg. For anxiety 2 mg FISH -160-1,000 mg Cap Generic drug:  omega 3-dha-epa-fish oil Your last dose was: Your next dose is: Take  by mouth two (2) times a day. Last dose 5/7/18  
     
   
   
   
  
 glimepiride 4 mg tablet Commonly known as:  AMARYL Your last dose was: Your next dose is: Take 1 Tab by mouth two (2) times a day. 4 mg  
    
   
   
   
  
 insulin degludec 100 unit/mL (3 mL) Inpn Commonly known as:  TRESIBA FLEXTOUCH U-100 Your last dose was: Your next dose is:    
   
   
 15 Units by SubCUTAneous route daily. 15 Units  
    
   
   
   
  
 meloxicam 15 mg tablet Commonly known as:  MOBIC Your last dose was: Your next dose is: Take 1 Tab by mouth daily. 15 mg  
    
   
   
   
  
 metFORMIN 1,000 mg tablet Commonly known as:  GLUCOPHAGE Your last dose was: Your next dose is: Take 1 Tab by mouth two (2) times daily (with meals). 1000 mg  
    
   
   
   
  
 metoprolol tartrate 50 mg tablet Commonly known as:  LOPRESSOR Your last dose was: Your next dose is: Take 1 Tab by mouth two (2) times a day. 50 mg  
    
   
   
   
  
 traMADol 50 mg tablet Commonly known as:  ULTRAM  
   
Your last dose was: Your next dose is: Take 1 Tab by mouth every six (6) hours as needed for Pain. Max Daily Amount: 200 mg.  
 50 mg  
    
   
   
   
  
 venlafaxine-SR 75 mg capsule Commonly known as:  EFFEXOR-XR  
   
 Your last dose was: Your next dose is: Take 1 Cap by mouth daily. 75 mg  
    
   
   
   
  
 VITAMIN D3 1,000 unit Cap Generic drug:  cholecalciferol Your last dose was: Your next dose is: Take  by mouth daily. Last dose 5/7/18 Opioid Education Prescription Opioids: What You Need to Know: 
 
 
ACTIVITY Get out of bed frequently and move legs to reduce risk of blood clots. Elevate foot (feet) for 48 hours. NO ICE Use wheel chair/ walker at all times. No weight bearing on operative foot. at anytime DIET Clear liquids until no nausea or vomiting; then light diet for the first day. Advance to regular diet on second day, unless your doctor orders otherwise. Avoid greasy and spicy today PAIN Take pain medications as directed by your doctor. Call your doctor if pain is NOT relieved by medication. DO NOT take aspirin or blood thinners until directed by your doctor. Take pain pills with food to reduce nausea May cause constipation. Use stool softener. Begin taking pills at dinner even if still numb DRESSING CARE Keep clean and dry until follow up appointment. Wrap in plastic bag to keep dry. Wearing a Cast: Care Instructions Your Care Instructions A cast protects a broken bone or other injury. Most casts are made of fiberglass, but plaster casts are still sometimes used. Once a cast is on, you can't remove it yourself. Your doctor will take it off. Follow-up care is a key part of your treatment and safety.  Be sure to make and go to all appointments, and call your doctor if you are having problems. It's also a good idea to know your test results and keep a list of the medicines you take. How can you care for yourself at home? General care · Follow your doctor's instructions for when you can first put weight on the cast. Fiberglass casts dry quickly and are soon ready to bear weight. But plaster casts may take several days before they are hard enough to use. When it's okay to put weight on your cast, do not stand or walk on it unless it is designed for walking. · Prop up the injured arm or leg on a pillow anytime you sit or lie down during the next 3 days. Try to keep it above the level of your heart. This will help reduce swelling. · If the fingers or toes on the limb with the cast were not injured, wiggle them every now and then. This helps move the blood and fluids in the injured limb. · Be safe with medicines. Read and follow all instructions on the label. ¨ If the doctor gave you a prescription medicine for pain, take it as prescribed. ¨ If you are not taking a prescription pain medicine, ask your doctor if you can take an over-the-counter medicine. · Keep up your muscle strength and tone as much as you can while protecting your injured limb or joint. Your doctor may want you to tense and relax the muscles protected by the cast. Check with your doctor or your physical or occupational therapist for instructions. Water and your cast 
· Keep your cast dry. · Tape a sheet of plastic to cover your cast when you take a shower or bath or when you have any other contact with water. Moisture can collect under the cast and cause skin irritation and itching. It can make infection more likely if you have had surgery or have a wound under the cast. 
Cast and skin care · Try blowing cool air from a hair dryer or fan into the cast to help relieve itching. Never stick items under your cast to scratch the skin.  
· Don't use oils or lotions near your cast. If the skin gets red or irritated around the edge of the cast, you may pad the edges with a soft material or use tape to cover them. When should you call for help? Call your doctor now or seek immediate medical care if: 
? · You have increased or severe pain. ? · You feel a warm or painful spot under the cast.  
? · You have problems with your cast. For example: ¨ The skin under the cast burns or stings. ¨ The cast feels too tight. ¨ There is a lot of swelling near the cast. (Some swelling is normal.) ¨ You have a new fever. ¨ There is drainage or a bad smell coming from the cast.  
? · Your foot or hand is cool or pale or changes color. ? · You have trouble moving your fingers or toes. ? · You have symptoms of a blood clot in your arm or leg (called a deep vein thrombosis). These may include: 
¨ Pain in the arm, calf, back of the knee, thigh, or groin. ¨ Redness and swelling in the arm, leg, or groin. ? Watch closely for changes in your health, and be sure to contact your doctor if: 
? · The cast is breaking apart. ? · You are not getting better as expected. Where can you learn more? Go to http://james-jannet.info/. Enter 454 1022 in the search box to learn more about \"Wearing a Cast: Care Instructions. \" Current as of: March 21, 2017 Content Version: 11.4 © 2850-5520 [employer]. Care instructions adapted under license by [employer] (which disclaims liability or warranty for this information). If you have questions about a medical condition or this instruction, always ask your healthcare professional. [address] any warranty or liability for your use of this information. FOLLOW-UP PHONE CALLS Calls will be made by nursing staff. If you have any problems, call your doctor as needed. CALL YOUR DOCTOR IF YOU HAVE Excessive bleeding that does not stop after holding mild pressure over the area. Temperature of 101 degrees or above. Redness, excessive swelling or bruising, and/or green or yellow, smelly discharge from incision. Loss of sensation - cold, white or blue toes. AFTER ANESTHESIA For the first 24 hours and while taking narcotics for pain: DO NOT Drive, Drink Alcoholic beverages, or make important Decisions. Be aware of dizziness following anesthesia and while taking pain medication. OTHER INSTRUCTIONS: RESUME ELIQUIS TODAY APPOINTMENT DATE/TIME: Office will call you to schedule a follow up appointment YOUR DOCTOR'S PHONE NUMBER [phone] DISCHARGE SUMMARY from Nurse PATIENT INSTRUCTIONS: 
 
After general anesthesia or intravenous sedation, for 24 hours or while taking prescription Narcotics: · Limit your activities · Do not drive and operate hazardous machinery · Do not make important personal or business decisions · Do  not drink alcoholic beverages · If you have not urinated within 8 hours after discharge, please contact your surgeon on call. *  Please give a list of your current medications to your Primary Care Provider. *  Please update this list whenever your medications are discontinued, doses are 
    changed, or new medications (including over-the-counter products) are added. *  Please carry medication information at all times in case of emergency situations. These are general instructions for a healthy lifestyle: No smoking/ No tobacco products/ Avoid exposure to second hand smoke Surgeon General's Warning:  Quitting smoking now greatly reduces serious risk to your health. Obesity, smoking, and sedentary lifestyle greatly increases your risk for illness A healthy diet, regular physical exercise & weight monitoring are important for maintaining a healthy lifestyle You may be retaining fluid if you have a history of heart failure or if you experience any of the following symptoms:  Weight gain of 3 pounds or more overnight or 5 pounds in a week, increased swelling in our hands or feet or shortness of breath while lying flat in bed. Please call your doctor as soon as you notice any of these symptoms; do not wait until your next office visit. Recognize signs and symptoms of STROKE: 
 
F-face looks uneven A-arms unable to move or move unevenly S-speech slurred or non-existent T-time-call 911 as soon as signs and symptoms begin-DO NOT go Back to bed or wait to see if you get better-TIME IS BRAIN. ACO Transitions of Care Introducing [employer] offers a voluntary care coordination program to provide high quality service and care to [facility] fee-for-service beneficiaries. Anaid Alcantar was designed to help you enhance your health and well-being through the following services: ? Transitions of Care  support for individuals who are transitioning from one care setting to another (example: Hospital to home). ? Chronic and Complex Care Coordination  support for individuals and caregivers of those with serious or chronic illnesses or with more than one chronic (ongoing) condition and those who take a number of different medications. If you meet specific medical criteria, a [facility] [address] may call you directly to coordinate your care with your primary care physician and your other care providers. For questions about the [facility] programs, please, contact your physicians office. For general questions or additional information about Accountable Care Organizations: 
Please visit www.medicare.gov/acos. html or call 1-800-MEDICARE (5-169.351.3102) TTY users should call 1-743.649.9474. Introducing [facility]! [facility] introduces AdExtent patient portal. Now you can access parts of your medical record, email your doctor's office, and request medication refills online. 1. In your internet browser, go to [email or web address] 2. Click on the First Time User? Click Here link in the Sign In box. You will see the New Member Sign Up page. 3. Enter your Guestmob Access Code exactly as it appears below. You will not need to use this code after youve completed the sign-up process. If you do not sign up before the expiration date, you must request a new code. · Guestmob Access Code: [email or web address] Expires: 5/17/2018 11:24 AM 
 
4. Enter the last four digits of your Social Security Number (xxxx) and Date of Birth (mm/dd/yyyy) as indicated and click Submit. You will be taken to the next sign-up page. 5. Create a ActiveTrakt ID. This will be your Guestmob login ID and cannot be changed, so think of one that is secure and easy to remember. 6. Create a Guestmob password. You can change your password at any time. 7. Enter your Password Reset Question and Answer. This can be used at a later time if you forget your password. 8. Enter your e-mail address. You will receive e-mail notification when new information is available in [address] 9. Click Sign Up. You can now view and download portions of your medical record. 10. Click the Download Summary menu link to download a portable copy of your medical information. If you have questions, please visit the Frequently Asked Questions section of the Guestmob website. Remember, Guestmob is NOT to be used for urgent needs. For medical emergencies, dial 911. Now available from your iPhone and Android! Introducing Sachin Miramontes As a [employer] patient, I wanted to make you aware of our electronic visit tool called Sachin Miramontes. [employer] 24/7 allows you to connect within minutes with a medical provider 24 hours a day, seven days a week via a mobile device or tablet or logging into a secure website from your computer. You can access Sachin Miramontes from anywhere in the [place]. A virtual visit might be right for you when you have a simple condition and feel like you just dont want to get out of bed, or cant get away from work for an appointment, when your regular [facility] provider is not available (evenings, weekends or holidays), or when youre out of town and need minor care. Electronic visits cost only $49 and if the [employer]  24/7 provider determines a prescription is needed to treat your condition, one can be electronically transmitted to a nearby pharmacy*. Please take a moment to enroll today if you have not already done so. The enrollment process is free and takes just a few minutes. To enroll, please download the [employer] 24/[employer] chris to your tablet or phone, or visit [email or web address] to enroll on your computer. And, as an [address] [place] [address] patient with a [employer] account, the results of your visits will be scanned into your electronic medical record and your primary care provider will be able to view the scanned results. We urge you to continue to see your regular [facility] provider for your ongoing medical care. And while your primary care provider may not be the one available when you seek a [employer] virtual visit, the peace of mind you get from getting a real diagnosis real time can be priceless. For more information on [employer], view our Frequently Asked Questions (FAQs) at [email or web address]. Sincerely, 
 
Loyd Guerrero MD 
Chief Medical Officer Claudia Terry *:  certain medications cannot be prescribed via [employer] Unresulted Labs-Please follow up with your PCP about these lab tests Order Current Status NC XR TECHNOLOGIST SERVICE In process Providers Seen During Your Hospitalization Provider Specialty Primary office phone Loren Reyes MD Orthopedic Surgery [phone] Your Primary Care Physician (PCP) Primary Care Physician Office Phone Office Fax Trenton Crocker [phone] You are allergic to the following Allergen Reactions Aspirin Nausea and Vomiting Recent Documentation Weight BMI OB Status Smoking Status 108 kg 33.19 kg/m2 Hysterectomy Former Smoker Emergency Contacts Name Discharge Info Relation Home Work Mobile Jose Alberto Amaya DISCHARGE CAREGIVER [3] Spouse [3] [phone] Brenda Ray DISCHARGE CAREGIVER [3] Daughter [21] [phone] Patient Belongings The following personal items are in your possession at time of discharge: 
  Dental Appliances: Uppers         Home Medications: None   Jewelry: None  Clothing: Footwear, Pants, Shirt    Other Valuables: None Please provide this summary of care documentation to your next provider. Signatures-by signing, you are acknowledging that this After Visit Summary has been reviewed with you and you have received a copy. Patient Signature:  ____________________________________________________________ Date:  ____________________________________________________________  
  
Celestine Padilla Provider Signature:  ____________________________________________________________ Date:  ____________________________________________________________

## 2018-05-10 NOTE — IP AVS SNAPSHOT
[address] 
[phone] Patient: Kd Alcantar MRN: [id number] YJO:[birth year] A check mary indicates which time of day the medication should be taken. My Medications ASK your doctor about these medications Instructions Each Dose to Equal  
 Morning Noon Evening Bedtime  
 amLODIPine 5 mg tablet Commonly known as:  Leia Bette Your last dose was: Your next dose is: Take 1 Tab by mouth daily (after breakfast). 5 mg  
    
   
   
   
  
 apixaban 5 mg tablet Commonly known as:  Arbutus Bon Your last dose was: Your next dose is: Take 1 Tab by mouth two (2) times a day. 5 mg  
    
   
   
   
  
 clonazePAM 2 mg tablet Commonly known as:  Raydelchadwick Leong Your last dose was: Your next dose is: Take 1 Tab by mouth nightly. Max Daily Amount: 2 mg. For anxiety 2 mg FISH -160-1,000 mg Cap Generic drug:  omega 3-dha-epa-fish oil Your last dose was: Your next dose is: Take  by mouth two (2) times a day. Last dose 5/7/18  
     
   
   
   
  
 glimepiride 4 mg tablet Commonly known as:  AMARYL Your last dose was: Your next dose is: Take 1 Tab by mouth two (2) times a day. 4 mg  
    
   
   
   
  
 insulin degludec 100 unit/mL (3 mL) Inpn Commonly known as:  TRESIBA FLEXTOUCH U-100 Your last dose was: Your next dose is:    
   
   
 15 Units by SubCUTAneous route daily. 15 Units  
    
   
   
   
  
 meloxicam 15 mg tablet Commonly known as:  MOBIC Your last dose was: Your next dose is: Take 1 Tab by mouth daily. 15 mg  
    
   
   
   
  
 metFORMIN 1,000 mg tablet Commonly known as:  GLUCOPHAGE Your last dose was: Your next dose is: Take 1 Tab by mouth two (2) times daily (with meals). 1000 mg  
    
   
   
   
  
 metoprolol tartrate 50 mg tablet Commonly known as:  LOPRESSOR Your last dose was: Your next dose is: Take 1 Tab by mouth two (2) times a day. 50 mg  
    
   
   
   
  
 traMADol 50 mg tablet Commonly known as:  ULTRAM  
   
Your last dose was: Your next dose is: Take 1 Tab by mouth every six (6) hours as needed for Pain. Max Daily Amount: 200 mg.  
 50 mg  
    
   
   
   
  
 venlafaxine-SR 75 mg capsule Commonly known as:  EFFEXOR-XR Your last dose was: Your next dose is: Take 1 Cap by mouth daily. 75 mg  
    
   
   
   
  
 VITAMIN D3 1,000 unit Cap Generic drug:  cholecalciferol Your last dose was: Your next dose is: Take  by mouth daily. Last dose 5/7/18

## 2018-05-10 NOTE — DISCHARGE INSTRUCTIONS
INSTRUCTIONS FOLLOWING FOOT SURGERY    ACTIVITY  Get out of bed frequently and move legs to reduce risk of blood clots. Elevate foot (feet) for 48 hours. NO ICE   Use wheel chair/ walker at all times. No weight bearing on operative foot. at anytime       DIET  Clear liquids until no nausea or vomiting; then light diet for the first day. Advance to regular diet on second day, unless your doctor orders otherwise. Avoid greasy and spicy today    PAIN  Take pain medications as directed by your doctor. Call your doctor if pain is NOT relieved by medication. DO NOT take aspirin or blood thinners until directed by your doctor. Take pain pills with food to reduce nausea  May cause constipation. Use stool softener. Begin taking pills at dinner even if still numb    DRESSING CARE  Keep clean and dry until follow up appointment. Wrap in plastic bag to keep dry. Wearing a Cast: Care Instructions  Your Care Instructions  A cast protects a broken bone or other injury. Most casts are made of fiberglass, but plaster casts are still sometimes used. Once a cast is on, you can't remove it yourself. Your doctor will take it off. Follow-up care is a key part of your treatment and safety. Be sure to make and go to all appointments, and call your doctor if you are having problems. It's also a good idea to know your test results and keep a list of the medicines you take. How can you care for yourself at home? General care  · Follow your doctor's instructions for when you can first put weight on the cast. Fiberglass casts dry quickly and are soon ready to bear weight. But plaster casts may take several days before they are hard enough to use. When it's okay to put weight on your cast, do not stand or walk on it unless it is designed for walking. · Prop up the injured arm or leg on a pillow anytime you sit or lie down during the next 3 days. Try to keep it above the level of your heart. This will help reduce swelling.   · If the fingers or toes on the limb with the cast were not injured, wiggle them every now and then. This helps move the blood and fluids in the injured limb. · Be safe with medicines. Read and follow all instructions on the label. ¨ If the doctor gave you a prescription medicine for pain, take it as prescribed. ¨ If you are not taking a prescription pain medicine, ask your doctor if you can take an over-the-counter medicine. · Keep up your muscle strength and tone as much as you can while protecting your injured limb or joint. Your doctor may want you to tense and relax the muscles protected by the cast. Check with your doctor or your physical or occupational therapist for instructions. Water and your cast  · Keep your cast dry. · Tape a sheet of plastic to cover your cast when you take a shower or bath or when you have any other contact with water. Moisture can collect under the cast and cause skin irritation and itching. It can make infection more likely if you have had surgery or have a wound under the cast.  Cast and skin care  · Try blowing cool air from a hair dryer or fan into the cast to help relieve itching. Never stick items under your cast to scratch the skin. · Don't use oils or lotions near your cast. If the skin gets red or irritated around the edge of the cast, you may pad the edges with a soft material or use tape to cover them. When should you call for help? Call your doctor now or seek immediate medical care if:  ? · You have increased or severe pain. ? · You feel a warm or painful spot under the cast.   ? · You have problems with your cast. For example:  ¨ The skin under the cast burns or stings. ¨ The cast feels too tight. ¨ There is a lot of swelling near the cast. (Some swelling is normal.)  ¨ You have a new fever. ¨ There is drainage or a bad smell coming from the cast.   ? · Your foot or hand is cool or pale or changes color. ? · You have trouble moving your fingers or toes.    ? · You have symptoms of a blood clot in your arm or leg (called a deep vein thrombosis). These may include:  ¨ Pain in the arm, calf, back of the knee, thigh, or groin. ¨ Redness and swelling in the arm, leg, or groin. ? Watch closely for changes in your health, and be sure to contact your doctor if:  ? · The cast is breaking apart. ? · You are not getting better as expected. Where can you learn more? Go to http://james-jannet.info/. Enter 454 [id number] in the search box to learn more about \"Wearing a Cast: Care Instructions. \"  Current as of: March 21, 2017  Content Version: 11.4  © 2319-6031 [employer]. Care instructions adapted under license by [employer] (which disclaims liability or warranty for this information). If you have questions about a medical condition or this instruction, always ask your healthcare professional. [place] [address] any warranty or liability for your use of this information. FOLLOW-UP PHONE CALLS  Calls will be made by nursing staff. If you have any problems, call your doctor as needed. CALL YOUR DOCTOR IF YOU HAVE  Excessive bleeding that does not stop after holding mild pressure over the area. Temperature of 101 degrees or above. Redness, excessive swelling or bruising, and/or green or yellow, smelly discharge from incision. Loss of sensation - cold, white or blue toes. AFTER ANESTHESIA  For the first 24 hours and while taking narcotics for pain: DO NOT Drive, Drink Alcoholic beverages, or make important Decisions. Be aware of dizziness following anesthesia and while taking pain medication.     OTHER INSTRUCTIONS: RESUME HERNAN TODAY     APPOINTMENT DATE/TIME: Office will call you to schedule a follow up appointment    YOUR DOCTOR'S PHONE NUMBER Gordon Gonzlao from Nurse    PATIENT INSTRUCTIONS:    After general anesthesia or intravenous sedation, for 24 hours or while taking prescription Narcotics:  · Limit your activities  · Do not drive and operate hazardous machinery  · Do not make important personal or business decisions  · Do  not drink alcoholic beverages  · If you have not urinated within 8 hours after discharge, please contact your surgeon on call. *  Please give a list of your current medications to your Primary Care Provider. *  Please update this list whenever your medications are discontinued, doses are      changed, or new medications (including over-the-counter products) are added. *  Please carry medication information at all times in case of emergency situations. These are general instructions for a healthy lifestyle:    No smoking/ No tobacco products/ Avoid exposure to second hand smoke    Surgeon General's Warning:  Quitting smoking now greatly reduces serious risk to your health. Obesity, smoking, and sedentary lifestyle greatly increases your risk for illness    A healthy diet, regular physical exercise & weight monitoring are important for maintaining a healthy lifestyle    You may be retaining fluid if you have a history of heart failure or if you experience any of the following symptoms:  Weight gain of 3 pounds or more overnight or 5 pounds in a week, increased swelling in our hands or feet or shortness of breath while lying flat in bed. Please call your doctor as soon as you notice any of these symptoms; do not wait until your next office visit. Recognize signs and symptoms of STROKE:    F-face looks uneven    A-arms unable to move or move unevenly    S-speech slurred or non-existent    T-time-call 911 as soon as signs and symptoms begin-DO NOT go       Back to bed or wait to see if you get better-TIME IS BRAIN.

## 2018-12-17 ENCOUNTER — HOME HEALTH ADMISSION (OUTPATIENT)
Dept: HOME HEALTH SERVICES | Facility: HOME HEALTH | Age: 73
End: 2018-12-17
Payer: MEDICARE

## 2018-12-18 ENCOUNTER — HOME CARE VISIT (OUTPATIENT)
Dept: SCHEDULING | Facility: HOME HEALTH | Age: 73
End: 2018-12-18
Payer: MEDICARE

## 2018-12-18 VITALS
SYSTOLIC BLOOD PRESSURE: 142 MMHG | DIASTOLIC BLOOD PRESSURE: 84 MMHG | HEART RATE: 80 BPM | RESPIRATION RATE: 17 BRPM | TEMPERATURE: 97.4 F

## 2018-12-18 PROCEDURE — 3331090002 HH PPS REVENUE DEBIT

## 2018-12-18 PROCEDURE — 3331090001 HH PPS REVENUE CREDIT

## 2018-12-18 PROCEDURE — G0151 HHCP-SERV OF PT,EA 15 MIN: HCPCS

## 2018-12-18 PROCEDURE — 400013 HH SOC

## 2018-12-19 PROCEDURE — 3331090002 HH PPS REVENUE DEBIT

## 2018-12-19 PROCEDURE — 3331090001 HH PPS REVENUE CREDIT

## 2018-12-20 ENCOUNTER — HOME CARE VISIT (OUTPATIENT)
Dept: SCHEDULING | Facility: HOME HEALTH | Age: 73
End: 2018-12-20
Payer: MEDICARE

## 2018-12-20 VITALS
HEART RATE: 100 BPM | TEMPERATURE: 97.8 F | RESPIRATION RATE: 16 BRPM | SYSTOLIC BLOOD PRESSURE: 158 MMHG | DIASTOLIC BLOOD PRESSURE: 82 MMHG

## 2018-12-20 PROCEDURE — 3331090001 HH PPS REVENUE CREDIT

## 2018-12-20 PROCEDURE — G0157 HHC PT ASSISTANT EA 15: HCPCS

## 2018-12-20 PROCEDURE — 3331090002 HH PPS REVENUE DEBIT

## 2018-12-21 PROCEDURE — 3331090002 HH PPS REVENUE DEBIT

## 2018-12-21 PROCEDURE — 3331090001 HH PPS REVENUE CREDIT

## 2018-12-22 PROCEDURE — 3331090001 HH PPS REVENUE CREDIT

## 2018-12-22 PROCEDURE — 3331090002 HH PPS REVENUE DEBIT

## 2018-12-23 PROCEDURE — 3331090001 HH PPS REVENUE CREDIT

## 2018-12-23 PROCEDURE — 3331090002 HH PPS REVENUE DEBIT

## 2018-12-24 PROCEDURE — 3331090001 HH PPS REVENUE CREDIT

## 2018-12-24 PROCEDURE — 3331090002 HH PPS REVENUE DEBIT

## 2018-12-25 PROCEDURE — 3331090001 HH PPS REVENUE CREDIT

## 2018-12-25 PROCEDURE — 3331090002 HH PPS REVENUE DEBIT

## 2018-12-26 ENCOUNTER — HOME CARE VISIT (OUTPATIENT)
Dept: SCHEDULING | Facility: HOME HEALTH | Age: 73
End: 2018-12-26
Payer: MEDICARE

## 2018-12-26 PROCEDURE — G0157 HHC PT ASSISTANT EA 15: HCPCS

## 2018-12-26 PROCEDURE — 3331090001 HH PPS REVENUE CREDIT

## 2018-12-26 PROCEDURE — 3331090002 HH PPS REVENUE DEBIT

## 2018-12-27 VITALS
HEART RATE: 68 BPM | DIASTOLIC BLOOD PRESSURE: 80 MMHG | RESPIRATION RATE: 18 BRPM | SYSTOLIC BLOOD PRESSURE: 150 MMHG | TEMPERATURE: 97.5 F

## 2018-12-27 PROCEDURE — 3331090001 HH PPS REVENUE CREDIT

## 2018-12-27 PROCEDURE — 3331090002 HH PPS REVENUE DEBIT

## 2018-12-28 ENCOUNTER — HOME CARE VISIT (OUTPATIENT)
Dept: SCHEDULING | Facility: HOME HEALTH | Age: 73
End: 2018-12-28
Payer: MEDICARE

## 2018-12-28 PROCEDURE — 3331090002 HH PPS REVENUE DEBIT

## 2018-12-28 PROCEDURE — G0157 HHC PT ASSISTANT EA 15: HCPCS

## 2018-12-28 PROCEDURE — 3331090001 HH PPS REVENUE CREDIT

## 2018-12-29 VITALS
HEART RATE: 84 BPM | SYSTOLIC BLOOD PRESSURE: 160 MMHG | DIASTOLIC BLOOD PRESSURE: 70 MMHG | RESPIRATION RATE: 18 BRPM | TEMPERATURE: 97.6 F

## 2018-12-29 PROCEDURE — 3331090001 HH PPS REVENUE CREDIT

## 2018-12-29 PROCEDURE — 3331090002 HH PPS REVENUE DEBIT

## 2018-12-30 PROCEDURE — 3331090002 HH PPS REVENUE DEBIT

## 2018-12-30 PROCEDURE — 3331090001 HH PPS REVENUE CREDIT

## 2018-12-31 PROCEDURE — 3331090002 HH PPS REVENUE DEBIT

## 2018-12-31 PROCEDURE — 3331090001 HH PPS REVENUE CREDIT

## 2019-01-01 PROCEDURE — 3331090001 HH PPS REVENUE CREDIT

## 2019-01-01 PROCEDURE — 3331090002 HH PPS REVENUE DEBIT

## 2019-01-02 ENCOUNTER — HOME CARE VISIT (OUTPATIENT)
Dept: SCHEDULING | Facility: HOME HEALTH | Age: 74
End: 2019-01-02
Payer: MEDICARE

## 2019-01-02 PROCEDURE — 3331090001 HH PPS REVENUE CREDIT

## 2019-01-02 PROCEDURE — 3331090002 HH PPS REVENUE DEBIT

## 2019-01-02 PROCEDURE — G0157 HHC PT ASSISTANT EA 15: HCPCS

## 2019-01-03 VITALS
RESPIRATION RATE: 18 BRPM | DIASTOLIC BLOOD PRESSURE: 70 MMHG | HEART RATE: 80 BPM | SYSTOLIC BLOOD PRESSURE: 112 MMHG | TEMPERATURE: 97.4 F

## 2019-01-03 PROCEDURE — 3331090002 HH PPS REVENUE DEBIT

## 2019-01-03 PROCEDURE — 3331090001 HH PPS REVENUE CREDIT

## 2019-01-04 ENCOUNTER — HOME CARE VISIT (OUTPATIENT)
Dept: HOME HEALTH SERVICES | Facility: HOME HEALTH | Age: 74
End: 2019-01-04
Payer: MEDICARE

## 2019-01-04 PROCEDURE — 3331090001 HH PPS REVENUE CREDIT

## 2019-01-04 PROCEDURE — 3331090002 HH PPS REVENUE DEBIT

## 2019-01-05 PROCEDURE — 3331090001 HH PPS REVENUE CREDIT

## 2019-01-05 PROCEDURE — 3331090002 HH PPS REVENUE DEBIT

## 2019-01-06 PROCEDURE — 3331090001 HH PPS REVENUE CREDIT

## 2019-01-06 PROCEDURE — 3331090002 HH PPS REVENUE DEBIT

## 2019-01-07 ENCOUNTER — HOME CARE VISIT (OUTPATIENT)
Dept: SCHEDULING | Facility: HOME HEALTH | Age: 74
End: 2019-01-07
Payer: MEDICARE

## 2019-01-07 VITALS
SYSTOLIC BLOOD PRESSURE: 136 MMHG | TEMPERATURE: 97.2 F | HEART RATE: 72 BPM | DIASTOLIC BLOOD PRESSURE: 82 MMHG | RESPIRATION RATE: 18 BRPM

## 2019-01-07 PROCEDURE — 3331090002 HH PPS REVENUE DEBIT

## 2019-01-07 PROCEDURE — G0151 HHCP-SERV OF PT,EA 15 MIN: HCPCS

## 2019-01-07 PROCEDURE — 3331090001 HH PPS REVENUE CREDIT

## 2019-01-08 PROCEDURE — 3331090001 HH PPS REVENUE CREDIT

## 2019-01-08 PROCEDURE — 3331090002 HH PPS REVENUE DEBIT

## 2019-01-09 PROCEDURE — 3331090002 HH PPS REVENUE DEBIT

## 2019-01-09 PROCEDURE — 3331090001 HH PPS REVENUE CREDIT

## 2019-01-10 PROCEDURE — 3331090002 HH PPS REVENUE DEBIT

## 2019-01-10 PROCEDURE — 3331090001 HH PPS REVENUE CREDIT

## 2019-01-11 ENCOUNTER — HOME CARE VISIT (OUTPATIENT)
Dept: SCHEDULING | Facility: HOME HEALTH | Age: 74
End: 2019-01-11
Payer: MEDICARE

## 2019-01-11 PROCEDURE — G0157 HHC PT ASSISTANT EA 15: HCPCS

## 2019-01-11 PROCEDURE — 3331090001 HH PPS REVENUE CREDIT

## 2019-01-11 PROCEDURE — 3331090002 HH PPS REVENUE DEBIT

## 2019-01-12 PROCEDURE — 3331090002 HH PPS REVENUE DEBIT

## 2019-01-12 PROCEDURE — 3331090001 HH PPS REVENUE CREDIT

## 2019-01-13 VITALS
DIASTOLIC BLOOD PRESSURE: 78 MMHG | SYSTOLIC BLOOD PRESSURE: 142 MMHG | RESPIRATION RATE: 18 BRPM | TEMPERATURE: 97.3 F | HEART RATE: 60 BPM

## 2019-01-13 PROCEDURE — 3331090001 HH PPS REVENUE CREDIT

## 2019-01-13 PROCEDURE — 3331090002 HH PPS REVENUE DEBIT

## 2019-01-14 PROCEDURE — 3331090001 HH PPS REVENUE CREDIT

## 2019-01-14 PROCEDURE — 3331090002 HH PPS REVENUE DEBIT

## 2019-01-15 ENCOUNTER — HOME CARE VISIT (OUTPATIENT)
Dept: SCHEDULING | Facility: HOME HEALTH | Age: 74
End: 2019-01-15
Payer: MEDICARE

## 2019-01-15 VITALS
TEMPERATURE: 97.5 F | RESPIRATION RATE: 18 BRPM | SYSTOLIC BLOOD PRESSURE: 130 MMHG | DIASTOLIC BLOOD PRESSURE: 82 MMHG | HEART RATE: 84 BPM

## 2019-01-15 PROCEDURE — G0157 HHC PT ASSISTANT EA 15: HCPCS

## 2019-01-15 PROCEDURE — 3331090001 HH PPS REVENUE CREDIT

## 2019-01-15 PROCEDURE — 3331090002 HH PPS REVENUE DEBIT

## 2019-01-16 PROCEDURE — 3331090001 HH PPS REVENUE CREDIT

## 2019-01-16 PROCEDURE — 3331090002 HH PPS REVENUE DEBIT

## 2019-01-17 ENCOUNTER — HOME CARE VISIT (OUTPATIENT)
Dept: SCHEDULING | Facility: HOME HEALTH | Age: 74
End: 2019-01-17
Payer: MEDICARE

## 2019-01-17 VITALS
RESPIRATION RATE: 16 BRPM | OXYGEN SATURATION: 92 % | HEART RATE: 80 BPM | SYSTOLIC BLOOD PRESSURE: 134 MMHG | DIASTOLIC BLOOD PRESSURE: 84 MMHG | TEMPERATURE: 97.7 F

## 2019-01-17 PROCEDURE — 3331090001 HH PPS REVENUE CREDIT

## 2019-01-17 PROCEDURE — G0157 HHC PT ASSISTANT EA 15: HCPCS

## 2019-01-17 PROCEDURE — 3331090002 HH PPS REVENUE DEBIT

## 2019-01-18 PROCEDURE — 3331090001 HH PPS REVENUE CREDIT

## 2019-01-18 PROCEDURE — 3331090002 HH PPS REVENUE DEBIT

## 2019-01-19 PROCEDURE — 3331090001 HH PPS REVENUE CREDIT

## 2019-01-19 PROCEDURE — 3331090002 HH PPS REVENUE DEBIT

## 2019-01-20 PROCEDURE — 3331090002 HH PPS REVENUE DEBIT

## 2019-01-20 PROCEDURE — 3331090001 HH PPS REVENUE CREDIT

## 2019-01-21 PROCEDURE — 3331090001 HH PPS REVENUE CREDIT

## 2019-01-21 PROCEDURE — 3331090002 HH PPS REVENUE DEBIT

## 2019-01-22 ENCOUNTER — HOME CARE VISIT (OUTPATIENT)
Dept: SCHEDULING | Facility: HOME HEALTH | Age: 74
End: 2019-01-22
Payer: MEDICARE

## 2019-01-22 PROCEDURE — 3331090001 HH PPS REVENUE CREDIT

## 2019-01-22 PROCEDURE — G0157 HHC PT ASSISTANT EA 15: HCPCS

## 2019-01-22 PROCEDURE — 3331090002 HH PPS REVENUE DEBIT

## 2019-01-23 VITALS
HEART RATE: 84 BPM | RESPIRATION RATE: 18 BRPM | TEMPERATURE: 97.8 F | DIASTOLIC BLOOD PRESSURE: 62 MMHG | SYSTOLIC BLOOD PRESSURE: 120 MMHG

## 2019-01-23 PROCEDURE — 3331090002 HH PPS REVENUE DEBIT

## 2019-01-23 PROCEDURE — 3331090001 HH PPS REVENUE CREDIT

## 2019-01-24 ENCOUNTER — HOME CARE VISIT (OUTPATIENT)
Dept: SCHEDULING | Facility: HOME HEALTH | Age: 74
End: 2019-01-24
Payer: MEDICARE

## 2019-01-24 ENCOUNTER — HOME CARE VISIT (OUTPATIENT)
Dept: HOME HEALTH SERVICES | Facility: HOME HEALTH | Age: 74
End: 2019-01-24
Payer: MEDICARE

## 2019-01-24 VITALS
HEART RATE: 80 BPM | SYSTOLIC BLOOD PRESSURE: 144 MMHG | RESPIRATION RATE: 16 BRPM | TEMPERATURE: 97.9 F | DIASTOLIC BLOOD PRESSURE: 72 MMHG

## 2019-01-24 PROCEDURE — 3331090002 HH PPS REVENUE DEBIT

## 2019-01-24 PROCEDURE — 3331090001 HH PPS REVENUE CREDIT

## 2019-01-24 PROCEDURE — G0157 HHC PT ASSISTANT EA 15: HCPCS

## 2019-01-25 PROCEDURE — 3331090001 HH PPS REVENUE CREDIT

## 2019-01-25 PROCEDURE — 3331090002 HH PPS REVENUE DEBIT

## 2019-01-26 PROCEDURE — 3331090002 HH PPS REVENUE DEBIT

## 2019-01-26 PROCEDURE — 3331090001 HH PPS REVENUE CREDIT

## 2019-01-27 PROCEDURE — 3331090002 HH PPS REVENUE DEBIT

## 2019-01-27 PROCEDURE — 3331090001 HH PPS REVENUE CREDIT

## 2019-01-28 ENCOUNTER — HOME CARE VISIT (OUTPATIENT)
Dept: SCHEDULING | Facility: HOME HEALTH | Age: 74
End: 2019-01-28
Payer: MEDICARE

## 2019-01-28 PROCEDURE — 3331090002 HH PPS REVENUE DEBIT

## 2019-01-28 PROCEDURE — 3331090001 HH PPS REVENUE CREDIT

## 2019-01-28 PROCEDURE — G0151 HHCP-SERV OF PT,EA 15 MIN: HCPCS

## 2019-01-28 PROCEDURE — 3331090003 HH PPS REVENUE ADJ

## 2019-01-29 VITALS
HEART RATE: 78 BPM | DIASTOLIC BLOOD PRESSURE: 74 MMHG | OXYGEN SATURATION: 96 % | SYSTOLIC BLOOD PRESSURE: 121 MMHG | TEMPERATURE: 99.3 F | RESPIRATION RATE: 17 BRPM

## 2019-01-29 PROCEDURE — 3331090002 HH PPS REVENUE DEBIT

## 2019-01-29 PROCEDURE — 3331090001 HH PPS REVENUE CREDIT

## 2019-02-08 ENCOUNTER — HOME HEALTH ADMISSION (OUTPATIENT)
Dept: HOME HEALTH SERVICES | Facility: HOME HEALTH | Age: 74
End: 2019-02-08
Payer: MEDICARE

## 2019-02-10 ENCOUNTER — HOME CARE VISIT (OUTPATIENT)
Dept: SCHEDULING | Facility: HOME HEALTH | Age: 74
End: 2019-02-10
Payer: MEDICARE

## 2019-02-10 PROCEDURE — G0151 HHCP-SERV OF PT,EA 15 MIN: HCPCS

## 2019-02-10 PROCEDURE — 3331090002 HH PPS REVENUE DEBIT

## 2019-02-10 PROCEDURE — 3331090001 HH PPS REVENUE CREDIT

## 2019-02-10 PROCEDURE — 400013 HH SOC

## 2019-02-11 PROCEDURE — 3331090001 HH PPS REVENUE CREDIT

## 2019-02-11 PROCEDURE — 3331090002 HH PPS REVENUE DEBIT

## 2019-02-12 ENCOUNTER — HOME CARE VISIT (OUTPATIENT)
Dept: SCHEDULING | Facility: HOME HEALTH | Age: 74
End: 2019-02-12
Payer: MEDICARE

## 2019-02-12 VITALS
DIASTOLIC BLOOD PRESSURE: 84 MMHG | TEMPERATURE: 97.3 F | HEART RATE: 88 BPM | RESPIRATION RATE: 18 BRPM | SYSTOLIC BLOOD PRESSURE: 146 MMHG

## 2019-02-12 PROCEDURE — 3331090002 HH PPS REVENUE DEBIT

## 2019-02-12 PROCEDURE — G0151 HHCP-SERV OF PT,EA 15 MIN: HCPCS

## 2019-02-12 PROCEDURE — 3331090001 HH PPS REVENUE CREDIT

## 2019-02-13 PROCEDURE — 3331090001 HH PPS REVENUE CREDIT

## 2019-02-13 PROCEDURE — 3331090002 HH PPS REVENUE DEBIT

## 2019-02-14 ENCOUNTER — HOME CARE VISIT (OUTPATIENT)
Dept: SCHEDULING | Facility: HOME HEALTH | Age: 74
End: 2019-02-14
Payer: MEDICARE

## 2019-02-14 PROCEDURE — 3331090002 HH PPS REVENUE DEBIT

## 2019-02-14 PROCEDURE — 3331090001 HH PPS REVENUE CREDIT

## 2019-02-14 PROCEDURE — G0157 HHC PT ASSISTANT EA 15: HCPCS

## 2019-02-15 VITALS
TEMPERATURE: 97.3 F | HEART RATE: 66 BPM | SYSTOLIC BLOOD PRESSURE: 158 MMHG | DIASTOLIC BLOOD PRESSURE: 80 MMHG | RESPIRATION RATE: 18 BRPM

## 2019-02-15 PROCEDURE — 3331090001 HH PPS REVENUE CREDIT

## 2019-02-15 PROCEDURE — 3331090002 HH PPS REVENUE DEBIT

## 2019-02-16 PROCEDURE — 3331090001 HH PPS REVENUE CREDIT

## 2019-02-16 PROCEDURE — 3331090002 HH PPS REVENUE DEBIT

## 2019-02-17 PROCEDURE — 3331090001 HH PPS REVENUE CREDIT

## 2019-02-17 PROCEDURE — 3331090002 HH PPS REVENUE DEBIT

## 2019-02-18 PROCEDURE — 3331090002 HH PPS REVENUE DEBIT

## 2019-02-18 PROCEDURE — 3331090001 HH PPS REVENUE CREDIT

## 2019-02-19 ENCOUNTER — HOME CARE VISIT (OUTPATIENT)
Dept: SCHEDULING | Facility: HOME HEALTH | Age: 74
End: 2019-02-19
Payer: MEDICARE

## 2019-02-19 PROCEDURE — G0157 HHC PT ASSISTANT EA 15: HCPCS

## 2019-02-19 PROCEDURE — 3331090001 HH PPS REVENUE CREDIT

## 2019-02-19 PROCEDURE — 3331090002 HH PPS REVENUE DEBIT

## 2019-02-20 VITALS
SYSTOLIC BLOOD PRESSURE: 110 MMHG | TEMPERATURE: 97.8 F | DIASTOLIC BLOOD PRESSURE: 70 MMHG | HEART RATE: 60 BPM | RESPIRATION RATE: 18 BRPM

## 2019-02-20 PROCEDURE — 3331090002 HH PPS REVENUE DEBIT

## 2019-02-20 PROCEDURE — 3331090001 HH PPS REVENUE CREDIT

## 2019-02-21 ENCOUNTER — HOME CARE VISIT (OUTPATIENT)
Dept: SCHEDULING | Facility: HOME HEALTH | Age: 74
End: 2019-02-21
Payer: MEDICARE

## 2019-02-21 PROCEDURE — 3331090002 HH PPS REVENUE DEBIT

## 2019-02-21 PROCEDURE — G0157 HHC PT ASSISTANT EA 15: HCPCS

## 2019-02-21 PROCEDURE — 3331090001 HH PPS REVENUE CREDIT

## 2019-02-22 ENCOUNTER — HOME CARE VISIT (OUTPATIENT)
Dept: HOME HEALTH SERVICES | Facility: HOME HEALTH | Age: 74
End: 2019-02-22
Payer: MEDICARE

## 2019-02-22 VITALS
HEART RATE: 70 BPM | RESPIRATION RATE: 18 BRPM | TEMPERATURE: 97.7 F | DIASTOLIC BLOOD PRESSURE: 80 MMHG | SYSTOLIC BLOOD PRESSURE: 130 MMHG

## 2019-02-22 PROCEDURE — 3331090002 HH PPS REVENUE DEBIT

## 2019-02-22 PROCEDURE — 3331090001 HH PPS REVENUE CREDIT

## 2019-02-23 PROCEDURE — 3331090001 HH PPS REVENUE CREDIT

## 2019-02-23 PROCEDURE — 3331090002 HH PPS REVENUE DEBIT

## 2019-02-24 PROCEDURE — 3331090001 HH PPS REVENUE CREDIT

## 2019-02-24 PROCEDURE — 3331090002 HH PPS REVENUE DEBIT

## 2019-02-25 PROCEDURE — 3331090002 HH PPS REVENUE DEBIT

## 2019-02-25 PROCEDURE — 3331090001 HH PPS REVENUE CREDIT

## 2019-02-26 PROCEDURE — 3331090001 HH PPS REVENUE CREDIT

## 2019-02-26 PROCEDURE — 3331090002 HH PPS REVENUE DEBIT

## 2019-02-27 ENCOUNTER — HOME CARE VISIT (OUTPATIENT)
Dept: SCHEDULING | Facility: HOME HEALTH | Age: 74
End: 2019-02-27
Payer: MEDICARE

## 2019-02-27 PROCEDURE — 3331090001 HH PPS REVENUE CREDIT

## 2019-02-27 PROCEDURE — 3331090002 HH PPS REVENUE DEBIT

## 2019-02-27 PROCEDURE — G0157 HHC PT ASSISTANT EA 15: HCPCS

## 2019-02-28 VITALS
RESPIRATION RATE: 18 BRPM | DIASTOLIC BLOOD PRESSURE: 70 MMHG | SYSTOLIC BLOOD PRESSURE: 122 MMHG | TEMPERATURE: 98.1 F | HEART RATE: 86 BPM

## 2019-02-28 PROCEDURE — 3331090002 HH PPS REVENUE DEBIT

## 2019-02-28 PROCEDURE — 3331090001 HH PPS REVENUE CREDIT

## 2019-03-01 PROCEDURE — 3331090002 HH PPS REVENUE DEBIT

## 2019-03-01 PROCEDURE — 3331090001 HH PPS REVENUE CREDIT

## 2019-03-02 PROCEDURE — 3331090002 HH PPS REVENUE DEBIT

## 2019-03-02 PROCEDURE — 3331090001 HH PPS REVENUE CREDIT

## 2019-03-03 PROCEDURE — 3331090001 HH PPS REVENUE CREDIT

## 2019-03-03 PROCEDURE — 3331090002 HH PPS REVENUE DEBIT

## 2019-03-04 PROCEDURE — 3331090001 HH PPS REVENUE CREDIT

## 2019-03-04 PROCEDURE — 3331090002 HH PPS REVENUE DEBIT

## 2019-03-05 ENCOUNTER — HOME CARE VISIT (OUTPATIENT)
Dept: SCHEDULING | Facility: HOME HEALTH | Age: 74
End: 2019-03-05
Payer: MEDICARE

## 2019-03-05 PROCEDURE — 3331090001 HH PPS REVENUE CREDIT

## 2019-03-05 PROCEDURE — 3331090002 HH PPS REVENUE DEBIT

## 2019-03-05 PROCEDURE — G0151 HHCP-SERV OF PT,EA 15 MIN: HCPCS

## 2019-03-06 VITALS
TEMPERATURE: 98.3 F | RESPIRATION RATE: 19 BRPM | SYSTOLIC BLOOD PRESSURE: 122 MMHG | HEART RATE: 78 BPM | DIASTOLIC BLOOD PRESSURE: 71 MMHG

## 2019-03-06 PROCEDURE — 3331090002 HH PPS REVENUE DEBIT

## 2019-03-06 PROCEDURE — 3331090001 HH PPS REVENUE CREDIT

## 2019-03-07 PROCEDURE — 3331090002 HH PPS REVENUE DEBIT

## 2019-03-07 PROCEDURE — 3331090001 HH PPS REVENUE CREDIT

## 2019-03-08 PROCEDURE — 3331090002 HH PPS REVENUE DEBIT

## 2019-03-08 PROCEDURE — 3331090001 HH PPS REVENUE CREDIT

## 2019-03-09 PROCEDURE — 3331090001 HH PPS REVENUE CREDIT

## 2019-03-09 PROCEDURE — 3331090002 HH PPS REVENUE DEBIT

## 2019-03-10 PROCEDURE — 3331090002 HH PPS REVENUE DEBIT

## 2019-03-10 PROCEDURE — 3331090001 HH PPS REVENUE CREDIT

## 2019-03-11 ENCOUNTER — HOME CARE VISIT (OUTPATIENT)
Dept: SCHEDULING | Facility: HOME HEALTH | Age: 74
End: 2019-03-11
Payer: MEDICARE

## 2019-03-11 VITALS
HEART RATE: 73 BPM | SYSTOLIC BLOOD PRESSURE: 132 MMHG | RESPIRATION RATE: 21 BRPM | TEMPERATURE: 97.8 F | DIASTOLIC BLOOD PRESSURE: 76 MMHG

## 2019-03-11 PROCEDURE — 3331090003 HH PPS REVENUE ADJ

## 2019-03-11 PROCEDURE — 3331090002 HH PPS REVENUE DEBIT

## 2019-03-11 PROCEDURE — 3331090001 HH PPS REVENUE CREDIT

## 2019-03-11 PROCEDURE — G0151 HHCP-SERV OF PT,EA 15 MIN: HCPCS

## 2019-04-14 ENCOUNTER — ANESTHESIA EVENT (OUTPATIENT)
Dept: SURGERY | Age: 74
DRG: 492 | End: 2019-04-14
Payer: MEDICARE

## 2019-04-14 ENCOUNTER — APPOINTMENT (OUTPATIENT)
Dept: CT IMAGING | Age: 74
DRG: 492 | End: 2019-04-14
Attending: EMERGENCY MEDICINE
Payer: MEDICARE

## 2019-04-14 ENCOUNTER — HOSPITAL ENCOUNTER (INPATIENT)
Age: 74
LOS: 8 days | Discharge: NURSING, REHAB OR LONG-TERM CARE | DRG: 492 | End: 2019-04-22
Attending: EMERGENCY MEDICINE | Admitting: FAMILY MEDICINE
Payer: MEDICARE

## 2019-04-14 ENCOUNTER — APPOINTMENT (OUTPATIENT)
Dept: GENERAL RADIOLOGY | Age: 74
DRG: 492 | End: 2019-04-14
Attending: EMERGENCY MEDICINE
Payer: MEDICARE
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
## 2019-04-14 DIAGNOSIS — S82.832A CLOSED FRACTURE OF DISTAL END OF LEFT FIBULA, UNSPECIFIED FRACTURE MORPHOLOGY, INITIAL ENCOUNTER: Primary | ICD-10-CM

## 2019-04-14 DIAGNOSIS — R09.02 HYPOXIA: ICD-10-CM

## 2019-04-14 PROBLEM — S82.899A ANKLE FRACTURE: Status: ACTIVE | Noted: 2019-04-14

## 2019-04-14 LAB
ALBUMIN SERPL-MCNC: 3.7 G/DL (ref 3.2–4.6)
ALBUMIN/GLOB SERPL: 0.7 {RATIO} (ref 1.2–3.5)
ALP SERPL-CCNC: 81 U/L (ref 50–136)
ALT SERPL-CCNC: 20 U/L (ref 12–65)
ANION GAP SERPL CALC-SCNC: 8 MMOL/L (ref 7–16)
AST SERPL-CCNC: 45 U/L (ref 15–37)
BACTERIA URNS QL MICRO: NORMAL /HPF
BASOPHILS # BLD: 0.1 K/UL (ref 0–0.2)
BASOPHILS NFR BLD: 1 % (ref 0–2)
BILIRUB SERPL-MCNC: 1 MG/DL (ref 0.2–1.1)
BUN SERPL-MCNC: 20 MG/DL (ref 8–23)
CALCIUM SERPL-MCNC: 9.9 MG/DL (ref 8.3–10.4)
CASTS URNS QL MICRO: NORMAL /LPF
CHLORIDE SERPL-SCNC: 99 MMOL/L (ref 98–107)
CO2 SERPL-SCNC: 26 MMOL/L (ref 21–32)
CREAT SERPL-MCNC: 1.48 MG/DL (ref 0.6–1)
DIFFERENTIAL METHOD BLD: ABNORMAL
EOSINOPHIL # BLD: 0.1 K/UL (ref 0–0.8)
EOSINOPHIL NFR BLD: 1 % (ref 0.5–7.8)
EPI CELLS #/AREA URNS HPF: NORMAL /HPF
ERYTHROCYTE [DISTWIDTH] IN BLOOD BY AUTOMATED COUNT: 14.4 % (ref 11.9–14.6)
GLOBULIN SER CALC-MCNC: 5.4 G/DL (ref 2.3–3.5)
GLUCOSE BLD STRIP.AUTO-MCNC: 168 MG/DL (ref 65–100)
GLUCOSE BLD STRIP.AUTO-MCNC: 203 MG/DL (ref 65–100)
GLUCOSE BLD STRIP.AUTO-MCNC: 209 MG/DL (ref 65–100)
GLUCOSE BLD STRIP.AUTO-MCNC: 213 MG/DL (ref 65–100)
GLUCOSE SERPL-MCNC: 241 MG/DL (ref 65–100)
HCT VFR BLD AUTO: 48.6 % (ref 35.8–46.3)
HGB BLD-MCNC: 14.5 G/DL (ref 11.7–15.4)
IMM GRANULOCYTES # BLD AUTO: 0.1 K/UL (ref 0–0.5)
IMM GRANULOCYTES NFR BLD AUTO: 1 % (ref 0–5)
LYMPHOCYTES # BLD: 1.9 K/UL (ref 0.5–4.6)
LYMPHOCYTES NFR BLD: 15 % (ref 13–44)
MCH RBC QN AUTO: 26.8 PG (ref 26.1–32.9)
MCHC RBC AUTO-ENTMCNC: 29.8 G/DL (ref 31.4–35)
MCV RBC AUTO: 89.8 FL (ref 79.6–97.8)
MONOCYTES # BLD: 1.1 K/UL (ref 0.1–1.3)
MONOCYTES NFR BLD: 9 % (ref 4–12)
NEUTS SEG # BLD: 9.4 K/UL (ref 1.7–8.2)
NEUTS SEG NFR BLD: 74 % (ref 43–78)
NRBC # BLD: 0 K/UL (ref 0–0.2)
PLATELET # BLD AUTO: 274 K/UL (ref 150–450)
PMV BLD AUTO: 11.2 FL (ref 9.4–12.3)
POTASSIUM SERPL-SCNC: 5.5 MMOL/L (ref 3.5–5.1)
PROT SERPL-MCNC: 9.1 G/DL (ref 6.3–8.2)
RBC # BLD AUTO: 5.41 M/UL (ref 4.05–5.2)
RBC #/AREA URNS HPF: NORMAL /HPF
SODIUM SERPL-SCNC: 133 MMOL/L (ref 136–145)
WBC # BLD AUTO: 12.7 K/UL (ref 4.3–11.1)
WBC URNS QL MICRO: NORMAL /HPF

## 2019-04-14 PROCEDURE — 74011250636 HC RX REV CODE- 250/636: Performed by: EMERGENCY MEDICINE

## 2019-04-14 PROCEDURE — 65270000029 HC RM PRIVATE

## 2019-04-14 PROCEDURE — 74011000302 HC RX REV CODE- 302: Performed by: FAMILY MEDICINE

## 2019-04-14 PROCEDURE — 80053 COMPREHEN METABOLIC PANEL: CPT

## 2019-04-14 PROCEDURE — 86580 TB INTRADERMAL TEST: CPT | Performed by: FAMILY MEDICINE

## 2019-04-14 PROCEDURE — 70450 CT HEAD/BRAIN W/O DYE: CPT

## 2019-04-14 PROCEDURE — 74011250637 HC RX REV CODE- 250/637: Performed by: INTERNAL MEDICINE

## 2019-04-14 PROCEDURE — 74011636637 HC RX REV CODE- 636/637: Performed by: FAMILY MEDICINE

## 2019-04-14 PROCEDURE — 71111 X-RAY EXAM RIBS/CHEST4/> VWS: CPT

## 2019-04-14 PROCEDURE — 81003 URINALYSIS AUTO W/O SCOPE: CPT | Performed by: EMERGENCY MEDICINE

## 2019-04-14 PROCEDURE — 81015 MICROSCOPIC EXAM OF URINE: CPT

## 2019-04-14 PROCEDURE — 74011250637 HC RX REV CODE- 250/637: Performed by: FAMILY MEDICINE

## 2019-04-14 PROCEDURE — 85025 COMPLETE CBC W/AUTO DIFF WBC: CPT

## 2019-04-14 PROCEDURE — 96360 HYDRATION IV INFUSION INIT: CPT | Performed by: EMERGENCY MEDICINE

## 2019-04-14 PROCEDURE — 73610 X-RAY EXAM OF ANKLE: CPT

## 2019-04-14 PROCEDURE — 99285 EMERGENCY DEPT VISIT HI MDM: CPT | Performed by: EMERGENCY MEDICINE

## 2019-04-14 PROCEDURE — 82962 GLUCOSE BLOOD TEST: CPT

## 2019-04-14 PROCEDURE — 74011000250 HC RX REV CODE- 250: Performed by: NURSE PRACTITIONER

## 2019-04-14 RX ORDER — LIDOCAINE 4 G/100G
1 PATCH TOPICAL EVERY 24 HOURS
Status: COMPLETED | OUTPATIENT
Start: 2019-04-14 | End: 2019-04-16

## 2019-04-14 RX ORDER — INSULIN LISPRO 100 [IU]/ML
INJECTION, SOLUTION INTRAVENOUS; SUBCUTANEOUS
Status: DISCONTINUED | OUTPATIENT
Start: 2019-04-14 | End: 2019-04-22 | Stop reason: HOSPADM

## 2019-04-14 RX ORDER — SODIUM CHLORIDE 0.9 % (FLUSH) 0.9 %
5-40 SYRINGE (ML) INJECTION EVERY 8 HOURS
Status: DISCONTINUED | OUTPATIENT
Start: 2019-04-14 | End: 2019-04-16 | Stop reason: SDUPTHER

## 2019-04-14 RX ORDER — CLONAZEPAM 0.5 MG/1
2 TABLET ORAL 2 TIMES DAILY
Status: DISCONTINUED | OUTPATIENT
Start: 2019-04-14 | End: 2019-04-16

## 2019-04-14 RX ORDER — SODIUM POLYSTYRENE SULFONATE 4.1 MEQ/G
30 POWDER, FOR SUSPENSION ORAL; RECTAL ONCE
Status: COMPLETED | OUTPATIENT
Start: 2019-04-14 | End: 2019-04-14

## 2019-04-14 RX ORDER — TRAZODONE HYDROCHLORIDE 50 MG/1
100 TABLET ORAL
Status: DISCONTINUED | OUTPATIENT
Start: 2019-04-14 | End: 2019-04-22 | Stop reason: HOSPADM

## 2019-04-14 RX ORDER — ACETAMINOPHEN 325 MG/1
650 TABLET ORAL
Status: DISCONTINUED | OUTPATIENT
Start: 2019-04-14 | End: 2019-04-22 | Stop reason: HOSPADM

## 2019-04-14 RX ORDER — GLIMEPIRIDE 4 MG/1
4 TABLET ORAL 2 TIMES DAILY WITH MEALS
Status: DISCONTINUED | OUTPATIENT
Start: 2019-04-14 | End: 2019-04-15

## 2019-04-14 RX ORDER — SODIUM CHLORIDE 0.9 % (FLUSH) 0.9 %
5-40 SYRINGE (ML) INJECTION AS NEEDED
Status: DISCONTINUED | OUTPATIENT
Start: 2019-04-14 | End: 2019-04-16 | Stop reason: SDUPTHER

## 2019-04-14 RX ORDER — DIPHENOXYLATE HYDROCHLORIDE AND ATROPINE SULFATE 2.5; .025 MG/1; MG/1
2 TABLET ORAL
Status: DISCONTINUED | OUTPATIENT
Start: 2019-04-14 | End: 2019-04-22 | Stop reason: HOSPADM

## 2019-04-14 RX ORDER — HYDROCODONE BITARTRATE AND ACETAMINOPHEN 5; 325 MG/1; MG/1
1 TABLET ORAL
Status: DISCONTINUED | OUTPATIENT
Start: 2019-04-14 | End: 2019-04-16

## 2019-04-14 RX ORDER — INSULIN GLARGINE 100 [IU]/ML
10 INJECTION, SOLUTION SUBCUTANEOUS
Status: DISCONTINUED | OUTPATIENT
Start: 2019-04-14 | End: 2019-04-17

## 2019-04-14 RX ORDER — VENLAFAXINE HYDROCHLORIDE 75 MG/1
150 CAPSULE, EXTENDED RELEASE ORAL DAILY
Status: DISCONTINUED | OUTPATIENT
Start: 2019-04-15 | End: 2019-04-22 | Stop reason: HOSPADM

## 2019-04-14 RX ORDER — MORPHINE SULFATE 2 MG/ML
1 INJECTION, SOLUTION INTRAMUSCULAR; INTRAVENOUS
Status: DISCONTINUED | OUTPATIENT
Start: 2019-04-14 | End: 2019-04-16

## 2019-04-14 RX ORDER — DONEPEZIL HYDROCHLORIDE 5 MG/1
10 TABLET, FILM COATED ORAL
Status: DISCONTINUED | OUTPATIENT
Start: 2019-04-14 | End: 2019-04-22 | Stop reason: HOSPADM

## 2019-04-14 RX ORDER — ONDANSETRON 2 MG/ML
4 INJECTION INTRAMUSCULAR; INTRAVENOUS
Status: DISCONTINUED | OUTPATIENT
Start: 2019-04-14 | End: 2019-04-16 | Stop reason: SDUPTHER

## 2019-04-14 RX ADMIN — INSULIN LISPRO 4 UNITS: 100 INJECTION, SOLUTION INTRAVENOUS; SUBCUTANEOUS at 12:39

## 2019-04-14 RX ADMIN — Medication 10 ML: at 21:37

## 2019-04-14 RX ADMIN — INSULIN GLARGINE 10 UNITS: 100 INJECTION, SOLUTION SUBCUTANEOUS at 16:37

## 2019-04-14 RX ADMIN — DONEPEZIL HYDROCHLORIDE 10 MG: 5 TABLET, FILM COATED ORAL at 21:31

## 2019-04-14 RX ADMIN — SODIUM POLYSTYRENE SULFONATE 30 G: 4.1 POWDER, FOR SUSPENSION ORAL; RECTAL at 18:14

## 2019-04-14 RX ADMIN — INSULIN LISPRO 2 UNITS: 100 INJECTION, SOLUTION INTRAVENOUS; SUBCUTANEOUS at 21:35

## 2019-04-14 RX ADMIN — Medication 10 ML: at 14:22

## 2019-04-14 RX ADMIN — TRAZODONE HYDROCHLORIDE 100 MG: 50 TABLET ORAL at 21:31

## 2019-04-14 RX ADMIN — CLONAZEPAM 2 MG: 0.5 TABLET ORAL at 18:14

## 2019-04-14 RX ADMIN — INSULIN LISPRO 4 UNITS: 100 INJECTION, SOLUTION INTRAVENOUS; SUBCUTANEOUS at 16:36

## 2019-04-14 RX ADMIN — TUBERCULIN PURIFIED PROTEIN DERIVATIVE 5 UNITS: 5 INJECTION, SOLUTION INTRADERMAL at 12:39

## 2019-04-14 RX ADMIN — SODIUM CHLORIDE 1000 ML: 900 INJECTION, SOLUTION INTRAVENOUS at 02:15

## 2019-04-14 RX ADMIN — INSULIN LISPRO 4 UNITS: 100 INJECTION, SOLUTION INTRAVENOUS; SUBCUTANEOUS at 07:30

## 2019-04-14 NOTE — PROGRESS NOTES
04/14/19 1211 Dual Skin Pressure Injury Assessment Dual Skin Pressure Injury Assessment WDL Second Care Provider (Based on Facility Policy) Ishmael Martinez RN Skin Integumentary Skin Integumentary (WDL) X Skin Color Appropriate for ethnicity; Ecchymosis (comment) 
(BUE) Skin Condition/Temp Warm;Dry Skin Integrity Abrasion 
(RUE) Turgor Non-tenting Hair Growth Present Varicosities Absent

## 2019-04-14 NOTE — H&P
HOSPITALIST H&P/CONSULTNAME:  Nas Olmedo Age:  76 y.o. 
:   1945 MRN:   [id number] PCP: Lina Mason MD 
Consulting MD: Treatment Team: Attending Provider: Renetta Bryant MD; Primary Nurse: Shante Johnson RN 
HPI:  
Patient is a 73BCC with PMH significant for afib on Eliquis, DM2, who presents after a fall. Patient reportedly rolled out of her bed onto the floor and was wrapped up in bedsheets, so she could not get up on her own. Patient's  could also not get her up, so he called their daughter (who is currently at bedside). She arrived and called EMS. Of note, patient apparently also sustained a fall injury yesterday, injuring her ankle. Pt was found to have an ankle fracture on XR, and her ankle is currently splinted. While in the ER, patient became slightly hypoxic on room air. Improved with supplemental oxygen. She denies over SOB or dyspnea. Denies cough/chest pain. Patient will be admitted for PT and further evaluation/intervention for her ankle fracture which will likely need STR. Complete ROS done and is as stated in HPI or otherwise negative Past Medical History:  
Diagnosis Date  Anxiety 2013  Arthritis  Atrial flutter ([address] 10/21/2016  Atrial flutter ([address]  Bradycardia 2017  Cancer ([address]   
 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  
 has right kidney  DM type 2 (diabetes mellitus, type 2) ([address]  6 yrs  
 type 2; A1C= 9.2 in 2018- does not check glucose  DM type 2 (diabetes mellitus, type 2) ([address] 2013  DVT (deep venous thrombosis) ([address]  Essential hypertension  Essential hypertension 10/21/2016  GERD (gastroesophageal reflux disease) 2013  
 chronic  GERD (gastroesophageal reflux disease) 2013  History of non-Hodgkin's lymphoma 12 yrs ago  
 remission  history of PUD (peptic ulcer disease) 4/17/2013  
 history of PUD (peptic ulcer disease) 4/17/2013  History of pulmonary embolus (PE) 20+ yrs  
 had a fx  History of tobacco abuse 52 yrs  
 quit 1 yr ago  Hyperlipidemia 4/17/2013  Insomnia 4/17/2013  Insomnia 4/17/2013  Metabolic encephalopathy 72/7/9613  Non Hodgkin's lymphoma ([facility] 4/17/2013  Obesity (BMI 30-39. 9) BMI- 33.2 (5/7/18)  Pacemaker  Paroxysmal atrial fibrillation ([facility] 6/6/2017  Poor historian 05/07/2018  Psychiatric disorder  Pulmonary embolism ([facility] [facility]  Thyroid disease   
 partial thyroidectomy Past Surgical History:  
Procedure Laterality Date  HX APPENDECTOMY  HX BREAST AUGMENTATION  40 yrs ago  
 rupture with removal and replacement  HX CATARACT REMOVAL  2010  
 bilateral  
 HX COLECTOMY  HX FRACTURE TX    
 right arm with pinning and hardware removal  
 HX PACEMAKER    
 HX PARTIAL THYROIDECTOMY  5/00 \"rosibel\"  HX KENYA AND BSO  NEUROLOGICAL PROCEDURE UNLISTED    
 back surgery Prior to Admission Medications Prescriptions Last Dose Informant Patient Reported? Taking? Insulin Needles, Disposable, 32 gauge x 5/32\" ndle   No No  
Sig: USE ONE PEN NEEDLE ONCE DAILY WITH INSULIN  
OTHER   No No  
Sig: Diabetic shoes   
(E11.40,  Z79.4) Type 2 diabetes mellitus with diabetic neuropathy, with long-term current use of insulin (HCC)  
acetaminophen (TYLENOL) 500 mg tablet   Yes No  
Sig: Take 500 mg by mouth every six (6) hours as needed for Pain. apixaban (ELIQUIS) 5 mg tablet   No No  
Sig: Take 1 Tab by mouth two (2) times a day. cholecalciferol (VITAMIN D3) 1,000 unit cap   Yes No  
Sig: Take 1,000 Units by mouth daily. Last dose 5/7/18   
clonazePAM (KLONOPIN) 2 mg tablet   No No  
Sig: Take 1 Tab by mouth two (2) times a day. Max Daily Amount: 4 mg. For anxiety diphenoxylate-atropine (LOMOTIL) 2.5-0.025 mg per tablet   No No  
 Sig: Take 2 Tabs by mouth four (4) times daily as needed for Diarrhea. Max Daily Amount: 8 Tabs. donepezil (ARICEPT) 10 mg tablet   No No  
Sig: Take 1 Tab by mouth nightly. 1/2 tab po qhs x 7 days, then whole  
glimepiride (AMARYL) 4 mg tablet   No No  
Sig: Take 1 Tab by mouth two (2) times a day. insulin degludec (TRESIBA FLEXTOUCH U-100) 100 unit/mL (3 mL) inpn   No No  
Sig: 10 Units by SubCUTAneous route Daily (before dinner). metFORMIN (GLUCOPHAGE) 500 mg tablet   No No  
Sig: TAKE 1 TABLET BY MOUTH TWICE DAILY WITH MEALS  
nystatin (MYCOSTATIN) powder   No No  
Sig: Apply  to affected area four (4) times daily. Patient taking differently: Apply 10 g to affected area four (4) times daily. omega 3-dha-epa-fish oil (FISH OIL) 100-160-1,000 mg cap   Yes No  
Sig: Take 1 Cap by mouth two (2) times a day. traZODone (DESYREL) 100 mg tablet   No No  
Sig: Take 1 Tab by mouth nightly. venlafaxine-SR (EFFEXOR-XR) 150 mg capsule   No No  
Sig: Take 1 Cap by mouth daily. Facility-Administered Medications: None Allergies Allergen Reactions  Aspirin Nausea and Vomiting Social History Tobacco Use  Smoking status: Former Smoker Packs/day: 1.00 Years: 52.00 Pack years: 52.00 Last attempt to quit: 2012 Years since quittin.7  Smokeless tobacco: Never Used Substance Use Topics  Alcohol use: No  
  Alcohol/week: 0.0 oz Family History Problem Relation Age of Onset  Cancer Mother   
     colon/stomach  Dementia Father   
     alzheimers  Cancer Sister   
     breast  
 Liver Disease Maternal Uncle Objective:  
 
Visit Vitals /69 Pulse (!) 103 Temp 97.4 °F (36.3 °C) Resp 20 Ht 5' 11\" (1.803 m) Wt 107 kg (236 lb) SpO2 92% BMI 32.92 kg/m² Temp (24hrs), Av.4 °F (36.3 °C), Min:97.4 °F (36.3 °C), Max:97.4 °F (36.3 °C) Oxygen Therapy O2 Sat (%): 92 % (19 0401) Pulse via Oximetry: 91 beats per minute (04/14/19 0401) O2 Device: Room air (04/14/19 0141) Physical Exam: 
General:    Alert, cooperative, no distress, appears stated age. Head:   Normocephalic, without obvious abnormality, atraumatic. Nose:  Nares normal. No drainage or sinus tenderness. Lungs:   Clear to auscultation bilaterally. No Wheezing or Rhonchi. No rales. Heart:   Regular rate and rhythm,  no murmur, rub or gallop. Abdomen:   Soft, non-tender. Not distended. Bowel sounds normal.  
Extremities: No cyanosis. No edema. No clubbing. L ankle is splinted Skin:     Texture, turgor normal. Ecchymoses on BUE. Not Jaundiced Neurologic: Alert and oriented x 3, no focal deficits Data Review:  
Recent Results (from the past 24 hour(s)) CBC WITH AUTOMATED DIFF Collection Time: 04/14/19  1:59 AM  
Result Value Ref Range WBC 12.7 (H) 4.3 - 11.1 K/uL  
 RBC 5.41 (H) 4.05 - 5.2 M/uL  
 HGB 14.5 11.7 - 15.4 g/dL HCT 48.6 (H) 35.8 - 46.3 % MCV 89.8 79.6 - 97.8 FL  
 MCH 26.8 26.1 - 32.9 PG  
 MCHC 29.8 (L) 31.4 - 35.0 g/dL  
 RDW 14.4 11.9 - 14.6 % PLATELET 701 288 - 228 K/uL MPV 11.2 9.4 - 12.3 FL ABSOLUTE NRBC 0.00 0.0 - 0.2 K/uL  
 DF AUTOMATED NEUTROPHILS 74 43 - 78 % LYMPHOCYTES 15 13 - 44 % MONOCYTES 9 4.0 - 12.0 % EOSINOPHILS 1 0.5 - 7.8 % BASOPHILS 1 0.0 - 2.0 % IMMATURE GRANULOCYTES 1 0.0 - 5.0 %  
 ABS. NEUTROPHILS 9.4 (H) 1.7 - 8.2 K/UL  
 ABS. LYMPHOCYTES 1.9 0.5 - 4.6 K/UL  
 ABS. MONOCYTES 1.1 0.1 - 1.3 K/UL  
 ABS. EOSINOPHILS 0.1 0.0 - 0.8 K/UL  
 ABS. BASOPHILS 0.1 0.0 - 0.2 K/UL  
 ABS. IMM. GRANS. 0.1 0.0 - 0.5 K/UL METABOLIC PANEL, COMPREHENSIVE Collection Time: 04/14/19  1:59 AM  
Result Value Ref Range Sodium 133 (L) 136 - 145 mmol/L Potassium 5.5 (H) 3.5 - 5.1 mmol/L Chloride 99 98 - 107 mmol/L  
 CO2 26 21 - 32 mmol/L Anion gap 8 7 - 16 mmol/L Glucose 241 (H) 65 - 100 mg/dL  BUN 20 8 - 23 MG/DL  
 Creatinine 1.48 (H) 0.6 - 1.0 MG/DL  
 GFR est AA 44 (L) >60 ml/min/1.73m2 GFR est non-AA 37 (L) >60 ml/min/1.73m2 Calcium 9.9 8.3 - 10.4 MG/DL Bilirubin, total 1.0 0.2 - 1.1 MG/DL  
 ALT (SGPT) 20 12 - 65 U/L  
 AST (SGOT) 45 (H) 15 - 37 U/L Alk. phosphatase 81 50 - 136 U/L Protein, total 9.1 (H) 6.3 - 8.2 g/dL Albumin 3.7 3.2 - 4.6 g/dL Globulin 5.4 (H) 2.3 - 3.5 g/dL A-G Ratio 0.7 (L) 1.2 - 3.5 Imaging Dre Ruano Simon Bolton Assessment and Plan: Active Hospital Problems Diagnosis Date Noted  Ankle fracture 04/14/2019  Fall 04/14/2019  Hypoxia 10/02/2017 Probable COPD + obesity  Paroxysmal atrial fibrillation ([facility] [address] 06/06/2017  DM type 2 (diabetes mellitus, type 2) ([facility] [facility] 04/17/2013 PLAN Falls - Will consult with PT 
- Will place PPD in case STR is needed - Fall precautions L Ankle Fracture - In splint from ED 
- PRN pain meds 
- Consult with Ortho for further recommendations Hypoxia - Low O2 sats on RA 
- Stable now on 2L - Pt has h/o unexplained hypoxia in the past as well, per daughter, as listed above, thought to be 2/2 possibly COPD/obesity hypoventilation - No current wheezing - Will wean O2 as tolerated - CXR clear Atrial Fibrillation 
- Continue home meds - On Eliquis 
- Hgb stable with recent injuries, not evidence of acute bleeding - No chest pain, rate controlled DM2 
- Home meds 
- SSI Anticipated discharge: 2-3 days, pending clinical course Signed By: Renuka Hilton MD   
 April 14, 2019

## 2019-04-14 NOTE — ED PROVIDER NOTES
Tiny Sprague is a 76 y.o. female seen on 4/14/2019 at 3:37 AM in the [facility] EMERGENCY DEPT in room ERA/ A. Chief Complaint Patient presents with  Fall HPI:  60-year-old female presenting to the emergency department for evaluation after a fall. She is accompanied by her daughter helps provide history. Apparently tonight she rolled out of bed falling to the floor. She was apparently wrapped in blankets and unable to stand up under her own power. The patient's  called the daughter who came over and was unable to help get her up either. As a result she called EMS. Apparently the patient had a fall yesterday injuring her ankle. It is discolored, deformed, and swollen. He is also complaining of right rib pain. She is also complaining of of having hit her head. She is on apixiban. Historian: patient, daughter REVIEW OF SYSTEMS Review of Systems Constitutional: Negative for fever. HENT: Negative. Eyes: Negative. Respiratory: Negative for cough, chest tightness, shortness of breath and wheezing. Cardiovascular: Chest pain:  chest wall pain. Gastrointestinal: Negative for abdominal distention, abdominal pain, constipation, diarrhea and vomiting. Endocrine: Negative. Genitourinary: Negative for dysuria, flank pain, frequency and urgency. Musculoskeletal: Positive for arthralgias and joint swelling. Skin: Positive for color change. Neurological: Negative for dizziness, syncope and headaches. Psychiatric/Behavioral: Negative. All other systems reviewed and are negative. PAST MEDICAL HISTORY Past Medical History:  
Diagnosis Date  Anxiety 4/17/2013  Arthritis  Atrial flutter ([address] 10/21/2016  Atrial flutter ([address]  Bradycardia 1/12/2017  Cancer ([address]   
 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  
 type 2; A1C= 9.2 in 2018- does not check glucose  DM type 2 (diabetes mellitus, type 2) ([facility] 2013  DVT (deep venous thrombosis) ([facility]  Essential hypertension  Essential hypertension 10/21/2016  GERD (gastroesophageal reflux disease) 2013  
 chronic  GERD (gastroesophageal reflux disease) 2013  History of non-Hodgkin's lymphoma 12 yrs ago  
 remission  history of PUD (peptic ulcer disease) 2013  
 history of PUD (peptic ulcer disease) 2013  History of pulmonary embolus (PE) 20+ yrs  
 had a fx  History of tobacco abuse 52 yrs  
 quit 1 yr ago  Hyperlipidemia 2013  Insomnia 2013  Insomnia 2013  Metabolic encephalopathy 71/3/9665  Non Hodgkin's lymphoma ([facility] 2013  Obesity (BMI 30-39. 9) BMI- 33.2 (18)  Pacemaker  Paroxysmal atrial fibrillation ([facility] 2017  Poor historian 2018  Psychiatric disorder  Pulmonary embolism ([facility]  Thyroid disease   
 partial thyroidectomy Past Surgical History:  
Procedure Laterality Date  HX APPENDECTOMY  HX BREAST AUGMENTATION  40 yrs ago  
 rupture with removal and replacement  HX CATARACT REMOVAL    
 bilateral  
 HX COLECTOMY  HX FRACTURE TX    
 right arm with pinning and hardware removal  
 HX PACEMAKER    
 HX PARTIAL THYROIDECTOMY   \"rosibel\"  HX KENYA AND BSO  NEUROLOGICAL PROCEDURE UNLISTED    
 back surgery Social History Socioeconomic History  Marital status:  Spouse name: Not on file  Number of children: Not on file  Years of education: Not on file  Highest education level: Not on file Tobacco Use  Smoking status: Former Smoker Packs/day: 1.00 Years: 52.00 Pack years: 52.00 Last attempt to quit: 2012 Years since quittin.7  Smokeless tobacco: Never Used Substance and Sexual Activity  Alcohol use: No  
  Alcohol/week: 0.0 oz  Drug use: No  
 Sexual activity: Not Currently Prior to Admission Medications Prescriptions Last Dose Informant Patient Reported? Taking? Insulin Needles, Disposable, 32 gauge x 5/32\" ndle   No No  
Sig: USE ONE PEN NEEDLE ONCE DAILY WITH INSULIN  
OTHER   No No  
Sig: Diabetic shoes   
(E11.40,  Z79.4) Type 2 diabetes mellitus with diabetic neuropathy, with long-term current use of insulin (HCC)  
acetaminophen (TYLENOL) 500 mg tablet   Yes No  
Sig: Take 500 mg by mouth every six (6) hours as needed for Pain. apixaban (ELIQUIS) 5 mg tablet   No No  
Sig: Take 1 Tab by mouth two (2) times a day. cholecalciferol (VITAMIN D3) 1,000 unit cap   Yes No  
Sig: Take 1,000 Units by mouth daily. Last dose 5/7/18   
clonazePAM (KLONOPIN) 2 mg tablet   No No  
Sig: Take 1 Tab by mouth two (2) times a day. Max Daily Amount: 4 mg. For anxiety diphenoxylate-atropine (LOMOTIL) 2.5-0.025 mg per tablet   No No  
Sig: Take 2 Tabs by mouth four (4) times daily as needed for Diarrhea. Max Daily Amount: 8 Tabs. donepezil (ARICEPT) 10 mg tablet   No No  
Sig: Take 1 Tab by mouth nightly. 1/2 tab po qhs x 7 days, then whole  
glimepiride (AMARYL) 4 mg tablet   No No  
Sig: Take 1 Tab by mouth two (2) times a day. insulin degludec (TRESIBA FLEXTOUCH U-100) 100 unit/mL (3 mL) inpn   No No  
Sig: 10 Units by SubCUTAneous route Daily (before dinner). metFORMIN (GLUCOPHAGE) 500 mg tablet   No No  
Sig: TAKE 1 TABLET BY MOUTH TWICE DAILY WITH MEALS  
nystatin (MYCOSTATIN) powder   No No  
Sig: Apply  to affected area four (4) times daily. Patient taking differently: Apply 10 g to affected area four (4) times daily. omega 3-dha-epa-fish oil (FISH OIL) 100-160-1,000 mg cap   Yes No  
Sig: Take 1 Cap by mouth two (2) times a day. traZODone (DESYREL) 100 mg tablet   No No  
Sig: Take 1 Tab by mouth nightly. venlafaxine-SR (EFFEXOR-XR) 150 mg capsule   No No  
Sig: Take 1 Cap by mouth daily. Facility-Administered Medications: None Allergies Allergen Reactions  Aspirin Nausea and Vomiting PHYSICAL EXAM    
 
Vitals:  
 04/14/19 0148 BP: 109/71 Pulse: 91  
Resp: 20 Temp: 97.4 °F (36.3 °C) SpO2: 93% Vital signs were reviewed. Physical Exam  
Constitutional: She is oriented to person, place, and time. She appears well-developed and well-nourished. No distress. HENT:  
Head: Normocephalic and atraumatic. Eyes: Pupils are equal, round, and reactive to light. EOM are normal.  
Neck: Normal range of motion. Neck supple. Cardiovascular: Normal rate, regular rhythm, normal heart sounds and intact distal pulses. Exam reveals no gallop and no friction rub. No murmur heard. Pulmonary/Chest: Effort normal and breath sounds normal. No stridor. No respiratory distress. She has no wheezes. She exhibits tenderness ( R lateral chest wall TTP). Abdominal: Soft. Bowel sounds are normal. She exhibits no distension and no mass. There is no tenderness. There is no rebound and no guarding. Musculoskeletal: She exhibits edema, tenderness and deformity. Significant ecchymosis, edema and deformity of the left lower extremity, the left foot is seems slightly externally rotated, there is a 2+ dorsalis pedis pulse, good capillary refill in the toes, full range of motion of the toes, she has slight limitation range of motion of ankle secondary to discomfort, hips are nontender Neurological: She is alert and oriented to person, place, and time. No sensory deficit. No focal neuro deficits Skin: Skin is warm and dry. Capillary refill takes less than 2 seconds. No rash noted. She is not diaphoretic. No erythema. Psychiatric: She has a normal mood and affect. Her behavior is normal.  
Vitals reviewed.  
  
 
MEDICAL DECISION MAKING  
 
 Differential Diagnosis: ankle fracture, rib fracture, intracranial injury MDM Number of Diagnoses or Management Options Amount and/or Complexity of Data Reviewed Clinical lab tests: ordered and reviewed Tests in the radiology section of CPT®: ordered and reviewed Risk of Complications, Morbidity, and/or Mortality Presenting problems: high Diagnostic procedures: high Management options: high Procedures ED Course:  Patient is noted to be hypoxic on room air with a room air saturation of 87-88%. 5:51 AM 
Xray of ankle shows bowser b distal fib fx. No disruption of mortise or significant displacement. No reduction needed. Will place posterior and stirrup splint in ER. X-ray of the chest shows no acute rib fracture. This was likely represents chest wall contusion. Given her multiple falls in the last 2 days, her recent ankle fracture, and concurrent eloquent disuse, I think she would benefit from an admission to the hospital for evaluation by physical therapy, treatment for her ankle fracture and further investigation to her hypoxia. I discussed case with hospitalist will admit for further treatment and care. Disposition:  admission to the hospital 
Diagnosis:  Distal fibula fracture, hypoxia, fall 
____________________________________________________________________ A portion of this note was generated using voice recognition dictation software. While the note has been reviewed for accuracy, please note certain words and phrases may not be transcribed as intended that some grammatical and/or typographical errors may be present.

## 2019-04-14 NOTE — CONSULTS
JOINT  CONSULT    Subjective:     Date of Consultation:  2019    Referring Physician:  Dr Tamela Hutchins is a 76 y.o.  female who is being seen for left ankle pain. Workup has revealed displaced Stewart B distal fibular fracture. Previous history of ORIF lisfranc injury left foot with retained hardware. Patient states she has foot deformity with moderate pronation when ambulating.  Patient fell and injured ankle 2019    Patient Active Problem List    Diagnosis Date Noted    Ankle fracture 2019    Fall 2019    Type 2 diabetes mellitus with nephropathy ([address]     Metabolic encephalopathy     Hypoxia 10/02/2017    Closed fracture of shaft of left humerus 10/02/2017    Morbid obesity ([address] 10/02/2017    Paroxysmal atrial fibrillation ([address] 2017    Pacemaker 2017    Bradycardia 2017    Atrial flutter ([address] 10/21/2016    Essential hypertension 10/21/2016    H/O breast implant 2013    Tobacco abuse 2013    Chronic bronchitis ([address] 2013    Anxiety 2013    Insomnia 2013    GERD (gastroesophageal reflux disease) 2013    Hyperlipidemia 2013    DM type 2 (diabetes mellitus, type 2) ([address] 2013     Family History   Problem Relation Age of Onset    Cancer Mother         colon/stomach    Dementia Father         alzheimers    Cancer Sister         breast    Liver Disease Maternal Uncle       Social History     Tobacco Use    Smoking status: Former Smoker     Packs/day: 1.00     Years: 52.00     Pack years: 52.00     Last attempt to quit: 2012     Years since quittin.7    Smokeless tobacco: Never Used   Substance Use Topics    Alcohol use: No     Alcohol/week: 0.0 oz     Past Medical History:   Diagnosis Date    Anxiety 2013    Arthritis     Atrial flutter ([address] 10/21/2016    Atrial flutter (HCC)     Bradycardia 2017    Cancer ([address]     nonhodkins lymphoma    Chronic bronchitis ([facility] 4/17/2013    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     DM type 2 (diabetes mellitus, type 2) ([facility]  6 yrs    type 2; A1C= 9.2 in Jan 2018- does not check glucose    DM type 2 (diabetes mellitus, type 2) ([facility] 4/17/2013    D[facility]     Essential hypertension     Essential hypertension 10/21/2016    GERD (gastroesophageal reflux disease) 4/17/2013    chronic    GERD (gastroesophageal reflux disease) 4/17/2013    History of non-Hodgkin's lymphoma 12 yrs ago    remission    history of PUD (peptic ulcer disease) 4/17/2013    history of PUD (peptic ulcer disease) 4/17/2013    History of pulmonary embolus (PE) 20+ yrs    had a fx    History of tobacco abuse 52 yrs    quit 1 yr ago    Hyperlipidemia 4/17/2013    Insomnia 4/17/2013    Insomnia 7/44/2593    Metabolic encephalopathy 49/0/6487    Non Hodgkin's lymphoma ([facility] 4/17/2013    Obesity (BMI 30-39. 9)     BMI- 33.2 (5/7/18)    Pacemaker     Paroxysmal atrial fibrillation ([facility] 6/6/2017    Poor historian 05/07/2018    Psychiatric disorder     Pulmonary embolism ([facility]     Thyroid disease     partial thyroidectomy      Past Surgical History:   Procedure Laterality Date    HX APPENDECTOMY      HX BREAST AUGMENTATION  40 yrs ago    rupture with removal and replacement    HX CATARACT REMOVAL  2010    bilateral    HX COLECTOMY      HX FRACTURE TX      right arm with pinning and hardware removal    HX PACEMAKER      HX PARTIAL THYROIDECTOMY  5/00    \"rosibel\"    HX KENYA AND BSO      NEUROLOGICAL PROCEDURE UNLISTED      back surgery      Prior to Admission medications    Medication Sig Start Date End Date Taking? Authorizing Provider   diphenoxylate-atropine (LOMOTIL) 2.5-0.025 mg per tablet Take 2 Tabs by mouth four (4) times daily as needed for Diarrhea. Max Daily Amount: 8 Tabs.  4/8/19   Mela Lopez MD   metFORMIN (GLUCOPHAGE) 500 mg tablet TAKE 1 TABLET BY MOUTH TWICE DAILY WITH MEALS 4/8/19   Bertha Monroy MD   traZODone (DESYREL) 100 mg tablet Take 1 Tab by mouth nightly. 4/8/19   Bertha Monroy MD   clonazePAM (KLONOPIN) 2 mg tablet Take 1 Tab by mouth two (2) times a day. Max Daily Amount: 4 mg. For anxiety 4/8/19   Bertha Monroy MD   nystatin (MYCOSTATIN) powder Apply  to affected area four (4) times daily. Patient taking differently: Apply 10 g to affected area four (4) times daily. 1/16/19   Bertha Monroy MD   glimepiride (AMARYL) 4 mg tablet Take 1 Tab by mouth two (2) times a day. 1/15/19   Bertha Monroy MD   insulin degludec (TRESIBA FLEXTOUCH U-100) 100 unit/mL (3 mL) inpn 10 Units by SubCUTAneous route Daily (before dinner). 1/7/19   Bertha Monroy MD   acetaminophen (TYLENOL) 500 mg tablet Take 500 mg by mouth every six (6) hours as needed for Pain. Provider, Historical   OTHER Diabetic shoes    (E11.40,  Z79.4) Type 2 diabetes mellitus with diabetic neuropathy, with long-term current use of insulin ([address] 12/17/18   Bertha Monroy MD   venlafaxine-SR [facility] 150 mg capsule Take 1 Cap by mouth daily. 12/17/18   Bertha Monroy MD   donepezil (ARICEPT) 10 mg tablet Take 1 Tab by mouth nightly. 1/2 tab po qhs x 7 days, then whole 12/17/18   Bertha Monroy MD   apixaban (ELIQUIS) 5 mg tablet Take 1 Tab by mouth two (2) times a day. 12/11/18   Gera Hernández MD   Insulin Needles, Disposable, 32 gauge x 5/32\" ndle USE ONE PEN NEEDLE ONCE DAILY WITH INSULIN 8/13/18   Bertha Monroy MD   omega 3-dha-epa-fish oil (FISH OIL) 100-160-1,000 mg cap Take 1 Cap by mouth two (2) times a day. Provider, Historical   cholecalciferol (VITAMIN D3) 1,000 unit cap Take 1,000 Units by mouth daily.  Last dose 5/7/18     Provider, Historical     Current Facility-Administered Medications   Medication Dose Route Frequency    apixaban (ELIQUIS) tablet 5 mg  5 mg Oral BID    [START ON 4/15/2019] venlafaxine-SR (EFFEXOR-XR) capsule 150 mg  150 mg Oral DAILY    donepezil (ARICEPT) tablet 10 mg  10 mg Oral QHS    insulin glargine (LANTUS) injection 10 Units  10 Units SubCUTAneous ACD    glimepiride (AMARYL) tablet 4 mg  4 mg Oral BID WITH MEALS    diphenoxylate-atropine (LOMOTIL) tablet 2 Tab  2 Tab Oral QID PRN    traZODone (DESYREL) tablet 100 mg  100 mg Oral QHS    clonazePAM (KlonoPIN) tablet 2 mg  2 mg Oral BID    sodium chloride (NS) flush 5-40 mL  5-40 mL IntraVENous Q8H    sodium chloride (NS) flush 5-40 mL  5-40 mL IntraVENous PRN    acetaminophen (TYLENOL) tablet 650 mg  650 mg Oral Q4H PRN    ondansetron (ZOFRAN) injection 4 mg  4 mg IntraVENous Q4H PRN    HYDROcodone-acetaminophen (NORCO) 5-325 mg per tablet 1 Tab  1 Tab Oral Q4H PRN    morphine injection 1 mg  1 mg IntraVENous Q4H PRN    insulin lispro (HUMALOG) injection   SubCUTAneous AC&HS    tuberculin injection 5 Units  5 Units IntraDERMal ONCE     Allergies   Allergen Reactions    Aspirin Nausea and Vomiting        Review of Systems:  Musculoskeletal:negative except for ankle pain and previous mentioned surgery. on foot    Objective:     Patient Vitals for the past 8 hrs:   BP Temp Pulse Resp SpO2   19 1211 144/81 98 °F (36.7 °C) 94 19 94 %   19 1021 135/80    94 %     Temp (24hrs), Av.7 °F (36.5 °C), Min:97.4 °F (36.3 °C), Max:98 °F (36.7 °C)        EXAM: Musculoskeletal: positive for - ankle pain left. Good sensation and pulse. Splint removed and ankle moderately swollen with ecchymosis. No hip, knee deformity. Upper extremities with adquate strength.     Data Review   Recent Results (from the past 24 hour(s))   CBC WITH AUTOMATED DIFF    Collection Time: 19  1:59 AM   Result Value Ref Range    WBC 12.7 (H) 4.3 - 11.1 K/uL    RBC 5.41 (H) 4.05 - 5.2 M/uL    HGB 14.5 11.7 - 15.4 g/dL    HCT 48.6 (H) 35.8 - 46.3 %    MCV 89.8 79.6 - 97.8 FL    MCH 26.8 26.1 - 32.9 PG    MCHC 29.8 (L) 31.4 - 35.0 g/dL    RDW 14.4 11.9 - 14.6 % PLATELET 074 419 - 280 K/uL    MPV 11.2 9.4 - 12.3 FL    ABSOLUTE NRBC 0.00 0.0 - 0.2 K/uL    DF AUTOMATED      NEUTROPHILS 74 43 - 78 %    LYMPHOCYTES 15 13 - 44 %    MONOCYTES 9 4.0 - 12.0 %    EOSINOPHILS 1 0.5 - 7.8 %    BASOPHILS 1 0.0 - 2.0 %    IMMATURE GRANULOCYTES 1 0.0 - 5.0 %    ABS. NEUTROPHILS 9.4 (H) 1.7 - 8.2 K/UL    ABS. LYMPHOCYTES 1.9 0.5 - 4.6 K/UL    ABS. MONOCYTES 1.1 0.1 - 1.3 K/UL    ABS. EOSINOPHILS 0.1 0.0 - 0.8 K/UL    ABS. BASOPHILS 0.1 0.0 - 0.2 K/UL    ABS. IMM. GRANS. 0.1 0.0 - 0.5 K/UL   METABOLIC PANEL, COMPREHENSIVE    Collection Time: 04/14/19  1:59 AM   Result Value Ref Range    Sodium 133 (L) 136 - 145 mmol/L    Potassium 5.5 (H) 3.5 - 5.1 mmol/L    Chloride 99 98 - 107 mmol/L    CO2 26 21 - 32 mmol/L    Anion gap 8 7 - 16 mmol/L    Glucose 241 (H) 65 - 100 mg/dL    BUN 20 8 - 23 MG/DL    Creatinine 1.48 (H) 0.6 - 1.0 MG/DL    GFR est AA 44 (L) >60 ml/min/1.73m2    GFR est non-AA 37 (L) >60 ml/min/1.73m2    Calcium 9.9 8.3 - 10.4 MG/DL    Bilirubin, total 1.0 0.2 - 1.1 MG/DL    ALT (SGPT) 20 12 - 65 U/L    AST (SGOT) 45 (H) 15 - 37 U/L    Alk. phosphatase 81 50 - 136 U/L    Protein, total 9.1 (H) 6.3 - 8.2 g/dL    Albumin 3.7 3.2 - 4.6 g/dL    Globulin 5.4 (H) 2.3 - 3.5 g/dL    A-G Ratio 0.7 (L) 1.2 - 3.5     URINE MICROSCOPIC    Collection Time: 04/14/19  6:14 AM   Result Value Ref Range    WBC 0-3 0 /hpf    RBC 0-3 0 /hpf    Epithelial cells 3-5 0 /hpf    Bacteria TRACE 0 /hpf    Casts 3-5 0 /lpf   GLUCOSE, POC    Collection Time: 04/14/19  7:45 AM   Result Value Ref Range    Glucose (POC) 203 (H) 65 - 100 mg/dL   GLUCOSE, POC    Collection Time: 04/14/19 12:22 PM   Result Value Ref Range    Glucose (POC) 209 (H) 65 - 100 mg/dL         Assessment/Plan:     Stable but displaced ankle fracture left     Recommend ORIF distal left fibula.  Patient on anticoagulants   Will place patient NPO at midnight  Continue splint until [place], DO

## 2019-04-14 NOTE — ED TRIAGE NOTES
Pt arrived via [facility] with daughter with c/o post fall. Pt fell out of bed, hit head on nightstand, no LOC,  unable to get her up and called daughter who called EMS. Pt injured left ankle and right flank. Spoke with Dr. Sherri Noel who ordered head CT due to blood thinner, Eloquis, and hit on head. Normal labs and x-ray per protocol. Pt presents A&Ox3 and in pain, hx of NIDDM.

## 2019-04-14 NOTE — ED NOTES
TRANSFER - OUT REPORT: 
 
Verbal report given to [facility]) on Erum Agustin  being transferred to 71(unit) for routine progression of care Report consisted of patients Situation, Background, Assessment and  
Recommendations(SBAR). Information from the following report(s) ED Summary was reviewed with the receiving nurse. Lines:  
Peripheral IV 04/14/19 Right Antecubital (Active) Site Assessment Clean, dry, & intact 4/14/2019  1:59 AM  
Phlebitis Assessment 0 4/14/2019  1:59 AM  
Infiltration Assessment 0 4/14/2019  1:59 AM  
Dressing Status Clean, dry, & intact 4/14/2019  1:59 AM  
Dressing Type Transparent 4/14/2019  1:59 AM  
Hub Color/Line Status Pink 4/14/2019  1:59 AM  
  
 
Opportunity for questions and clarification was provided. Patient transported with: 
Transport

## 2019-04-14 NOTE — PROGRESS NOTES
TRANSFER - IN REPORT: 
 
Verbal report received from [place], RN(name) on Candis Jaeger  being received from ED(unit) for routine progression of care Report consisted of patients Situation, Background, Assessment and  
Recommendations(SBAR). Information from the following report(s) ED Summary, Intake/Output, MAR and Recent Results was reviewed with the receiving nurse. Opportunity for questions and clarification was provided. Assessment completed upon patients arrival to unit and care assumed.

## 2019-04-14 NOTE — PROGRESS NOTES
Subjective Pt is a 76 y.o. Female with PMH Afib on Eliquis, DM II and congenital kidney d(with 1 kidney) was admitted for left ankle fracture after she sustained a fall when she off her bed. Pt is laying in bed with left ankle splint on. Reports she is hurting at her right ribs where she hit it at a night stand when she fell. Right rib pain is worse only when she takes a deep breath. Has some cough, but denies shortness of breath or chest pain. Temp:  [97.4 °F (36.3 °C)-98 °F (36.7 °C)] Pulse (Heart Rate):  [] BP: (109-146)/(63-81) Resp Rate:  [19-20] O2 Sat (%):  [90 %-94 %] Weight:  [107 kg (236 lb)] No intake/output data recorded. 04/12 1901 - 04/14 0700 In: 1000 [I.V.:1000] Out: -  
 
 
Objective: 
General Appearance:  Comfortable, in pain and in no acute distress. Vital signs: (most recent): Blood pressure 114/72, pulse (!) 123, temperature 98.6 °F (37 °C), resp. rate 18, height 5' 11\" (1.803 m), weight 107 kg (236 lb), SpO2 90 %. No fever. (Tacycardic). Output: Producing urine. Lungs:  Normal effort and normal respiratory rate. Breath sounds clear to auscultation. No decreased breath sounds or wheezes. (Right ribs area tenderness. No bruising noted.) Heart: Normal rate. Regular rhythm. Abdomen: Abdomen is soft. Bowel sounds are normal.   There is no abdominal tenderness. Extremities: Decreased range of motion. (Left lower extremity in splint) Pulses: (Except left dorsalis pedis. Foot in splint) Neurological: Patient is alert and oriented to person, place and time. Pupils:  Pupils are equal, round, and reactive to light. Skin:  Warm and dry. Active Problems: 
  DM type 2 (diabetes mellitus, type 2) ([facility] (4/17/2013) Paroxysmal atrial fibrillation ([facility] (6/6/2017) Hypoxia (10/2/2017) Overview: Probable COPD + obesity Ankle fracture (4/14/2019) Fall (4/14/2019) Assessment & Plan Left ankle fracture: Per Orthopedic Dr Ernesto Mulligan, pt will have ORIF distal left tibia tomorrow. Hold Amaryl and Eliquis; NPO past MN. Discussed case with Dr Cindi Watson :  
 
Right rib pain from fall: 4/14:  
1. No displaced rib fracture is seen. 
 2. No pulmonary consolidation or pneumothorax. Will order Lidocaine 4% topical patch and Incentive Spirometry to help with deep breathing. Potassium 5.5: Kayexalate 30 g X 1 dose ordered. Cr 14.8, WBC 12.7 : Monitor and recheck labs in AM.

## 2019-04-15 ENCOUNTER — ANESTHESIA (OUTPATIENT)
Dept: SURGERY | Age: 74
DRG: 492 | End: 2019-04-15
Payer: MEDICARE

## 2019-04-15 LAB
ALBUMIN SERPL-MCNC: 2.9 G/DL (ref 3.2–4.6)
ALBUMIN/GLOB SERPL: 0.7 {RATIO} (ref 1.2–3.5)
ALP SERPL-CCNC: 65 U/L (ref 50–136)
ALT SERPL-CCNC: 13 U/L (ref 12–65)
ANION GAP SERPL CALC-SCNC: 8 MMOL/L (ref 7–16)
AST SERPL-CCNC: 19 U/L (ref 15–37)
BASOPHILS # BLD: 0.1 K/UL (ref 0–0.2)
BASOPHILS NFR BLD: 1 % (ref 0–2)
BILIRUB SERPL-MCNC: 0.5 MG/DL (ref 0.2–1.1)
BUN SERPL-MCNC: 19 MG/DL (ref 8–23)
CALCIUM SERPL-MCNC: 9.2 MG/DL (ref 8.3–10.4)
CHLORIDE SERPL-SCNC: 105 MMOL/L (ref 98–107)
CO2 SERPL-SCNC: 26 MMOL/L (ref 21–32)
CREAT SERPL-MCNC: 1.05 MG/DL (ref 0.6–1)
DIFFERENTIAL METHOD BLD: ABNORMAL
EOSINOPHIL # BLD: 0.4 K/UL (ref 0–0.8)
EOSINOPHIL NFR BLD: 5 % (ref 0.5–7.8)
ERYTHROCYTE [DISTWIDTH] IN BLOOD BY AUTOMATED COUNT: 14.4 % (ref 11.9–14.6)
GLOBULIN SER CALC-MCNC: 4.2 G/DL (ref 2.3–3.5)
GLUCOSE BLD STRIP.AUTO-MCNC: 187 MG/DL (ref 65–100)
GLUCOSE BLD STRIP.AUTO-MCNC: 192 MG/DL (ref 65–100)
GLUCOSE BLD STRIP.AUTO-MCNC: 201 MG/DL (ref 65–100)
GLUCOSE BLD STRIP.AUTO-MCNC: 246 MG/DL (ref 65–100)
GLUCOSE SERPL-MCNC: 186 MG/DL (ref 65–100)
HCT VFR BLD AUTO: 41.5 % (ref 35.8–46.3)
HGB BLD-MCNC: 12.4 G/DL (ref 11.7–15.4)
IMM GRANULOCYTES # BLD AUTO: 0 K/UL (ref 0–0.5)
IMM GRANULOCYTES NFR BLD AUTO: 1 % (ref 0–5)
LYMPHOCYTES # BLD: 1.8 K/UL (ref 0.5–4.6)
LYMPHOCYTES NFR BLD: 22 % (ref 13–44)
MCH RBC QN AUTO: 27.3 PG (ref 26.1–32.9)
MCHC RBC AUTO-ENTMCNC: 29.9 G/DL (ref 31.4–35)
MCV RBC AUTO: 91.2 FL (ref 79.6–97.8)
MM INDURATION POC: 0 MM (ref 0–5)
MONOCYTES # BLD: 0.9 K/UL (ref 0.1–1.3)
MONOCYTES NFR BLD: 11 % (ref 4–12)
NEUTS SEG # BLD: 5 K/UL (ref 1.7–8.2)
NEUTS SEG NFR BLD: 62 % (ref 43–78)
NRBC # BLD: 0 K/UL (ref 0–0.2)
PLATELET # BLD AUTO: 206 K/UL (ref 150–450)
PMV BLD AUTO: 11.2 FL (ref 9.4–12.3)
POTASSIUM SERPL-SCNC: 4.3 MMOL/L (ref 3.5–5.1)
PPD POC: NEGATIVE NEGATIVE
PROT SERPL-MCNC: 7.1 G/DL (ref 6.3–8.2)
RBC # BLD AUTO: 4.55 M/UL (ref 4.05–5.2)
SODIUM SERPL-SCNC: 139 MMOL/L (ref 136–145)
WBC # BLD AUTO: 8.2 K/UL (ref 4.3–11.1)

## 2019-04-15 PROCEDURE — 65270000029 HC RM PRIVATE

## 2019-04-15 PROCEDURE — 74011000250 HC RX REV CODE- 250: Performed by: NURSE PRACTITIONER

## 2019-04-15 PROCEDURE — 85025 COMPLETE CBC W/AUTO DIFF WBC: CPT

## 2019-04-15 PROCEDURE — 36415 COLL VENOUS BLD VENIPUNCTURE: CPT

## 2019-04-15 PROCEDURE — 94760 N-INVAS EAR/PLS OXIMETRY 1: CPT

## 2019-04-15 PROCEDURE — 74011636637 HC RX REV CODE- 636/637: Performed by: FAMILY MEDICINE

## 2019-04-15 PROCEDURE — 77030013140 HC MSK NEB VYRM -A

## 2019-04-15 PROCEDURE — 74011250637 HC RX REV CODE- 250/637: Performed by: FAMILY MEDICINE

## 2019-04-15 PROCEDURE — 82962 GLUCOSE BLOOD TEST: CPT

## 2019-04-15 PROCEDURE — 94640 AIRWAY INHALATION TREATMENT: CPT

## 2019-04-15 PROCEDURE — 80053 COMPREHEN METABOLIC PANEL: CPT

## 2019-04-15 PROCEDURE — 74011000250 HC RX REV CODE- 250: Performed by: INTERNAL MEDICINE

## 2019-04-15 PROCEDURE — 77010033678 HC OXYGEN DAILY

## 2019-04-15 RX ORDER — CEFAZOLIN SODIUM/WATER 2 G/20 ML
2 SYRINGE (ML) INTRAVENOUS
Status: COMPLETED | OUTPATIENT
Start: 2019-04-16 | End: 2019-04-16

## 2019-04-15 RX ORDER — ALBUTEROL SULFATE 0.83 MG/ML
2.5 SOLUTION RESPIRATORY (INHALATION)
Status: DISCONTINUED | OUTPATIENT
Start: 2019-04-15 | End: 2019-04-22 | Stop reason: HOSPADM

## 2019-04-15 RX ADMIN — HYDROCODONE BITARTRATE AND ACETAMINOPHEN 1 TABLET: 5; 325 TABLET ORAL at 14:33

## 2019-04-15 RX ADMIN — DONEPEZIL HYDROCHLORIDE 10 MG: 5 TABLET, FILM COATED ORAL at 21:22

## 2019-04-15 RX ADMIN — INSULIN LISPRO 2 UNITS: 100 INJECTION, SOLUTION INTRAVENOUS; SUBCUTANEOUS at 21:22

## 2019-04-15 RX ADMIN — INSULIN LISPRO 4 UNITS: 100 INJECTION, SOLUTION INTRAVENOUS; SUBCUTANEOUS at 12:41

## 2019-04-15 RX ADMIN — CLONAZEPAM 2 MG: 0.5 TABLET ORAL at 17:01

## 2019-04-15 RX ADMIN — INSULIN LISPRO 4 UNITS: 100 INJECTION, SOLUTION INTRAVENOUS; SUBCUTANEOUS at 16:30

## 2019-04-15 RX ADMIN — Medication 5 ML: at 21:23

## 2019-04-15 RX ADMIN — ALBUTEROL SULFATE 2.5 MG: 2.5 SOLUTION RESPIRATORY (INHALATION) at 20:16

## 2019-04-15 RX ADMIN — INSULIN GLARGINE 10 UNITS: 100 INJECTION, SOLUTION SUBCUTANEOUS at 17:01

## 2019-04-15 RX ADMIN — Medication 5 ML: at 04:47

## 2019-04-15 RX ADMIN — VENLAFAXINE HYDROCHLORIDE 150 MG: 75 CAPSULE, EXTENDED RELEASE ORAL at 08:53

## 2019-04-15 RX ADMIN — Medication 5 ML: at 13:14

## 2019-04-15 RX ADMIN — TRAZODONE HYDROCHLORIDE 100 MG: 50 TABLET ORAL at 21:22

## 2019-04-15 RX ADMIN — INSULIN LISPRO 2 UNITS: 100 INJECTION, SOLUTION INTRAVENOUS; SUBCUTANEOUS at 08:53

## 2019-04-15 RX ADMIN — CLONAZEPAM 2 MG: 0.5 TABLET ORAL at 08:53

## 2019-04-15 NOTE — CONSULTS
ORTHO:    PATIENT SCHEDULED FOR SURGERY Tuesday 4/16/19 WITH DR GILL - OPEN REDUCTION INTERNAL FIXATION OF LEFT ANKLE    PATIENT MAY EAT TODAY - NPO AFTER MIDNIGHT

## 2019-04-15 NOTE — PROGRESS NOTES
Orthopedic Progress Note April 15, 2019 Admit Date: 2019 Admit Diagnosis: Fall [W19. Roise Amen Ankle fracture [Y09.038R] Post Op day: * No surgery date entered * Subjective:  
 
Cristopher Hall Pt with L ankle fx Objective:  
 
Vital Signs:   
Temp (24hrs), Av.4 °F (36.9 °C), Min:98 °F (36.7 °C), Max:98.6 °F (37 °C) LAB:   
[unfilled] Lab Results Component Value Date/Time INR 1.0 2017 01:14 PM  
 INR 1.0 2016 07:46 AM  
 
Lab Results Component Value Date/Time HGB 12.4 04/15/2019 05:08 AM  
 HGB 14.5 2019 01:59 AM  
 
 
Physical Exam: 
 
Tenderness left medial and lateral malleoli Plan: To OR tomorrow for ORIF L ankle Continue PT/OT rehab as indicated Nursing and SS for disposition plans Signed By: Candice Carmen MD

## 2019-04-15 NOTE — PROGRESS NOTES
Physical Therapy Note: 
 
Physical therapy evaluation orders received and chart reviewed. Patient admitted with left distal fibular fracture with plans for surgical repair today. Will hold mobility assessment at this time and see post operatively per orthopedic surgery. Thank you for this consult. Will follow, Prema Deleon, PT, DPT 
4/15/19

## 2019-04-15 NOTE — PROGRESS NOTES
During IDT rounds on this date, team discussed plan of care and discharge planning with patient and family in room. Patient is recommended for STR, and is requesting a referral for [facility]. CM provided SNF list, and encouraged patient and family to choose additional options in the event [facility] is unable to accommodate patient's needs. Referral has not yet been sent to [facility] as patient is pending surgical procedure, and will then need PT/OT evaluations before placing referral. CM will continue to follow case and assist with discharge planning. Care Management Interventions PCP Verified by CM: Yes Mode of Transport at Discharge: S Transition of Care Consult (CM Consult): Discharge Planning Discharge Durable Medical Equipment: No(to be ordered at SNF if indicated) Physical Therapy Consult: Yes Occupational Therapy Consult: No 
Speech Therapy Consult: No 
Confirm Follow Up Transport: Family Plan discussed with Pt/Family/Caregiver: Yes(Spoke with family and patient in room.) [place] of Choice Offered: Yes Discharge Location Discharge Placement: Rehab Unit Subacute

## 2019-04-15 NOTE — PROGRESS NOTES
7th floor called informing them that we will send for pt around 0745 for surgery. To make sure she has had bath and has at least a 20g IV that functions well.

## 2019-04-15 NOTE — PROGRESS NOTES
Initial visit to assess pt's spiritual needs. Pt. Will have surgery tomorrow to repair broken bones in her ankle and leg. She is worried about caring for herself and , who is undergoing chemo & radiation for cancer, while she recuperates. Pt will phone her [employer] [facility] to ask for assistance. Feeling today? Nervous, anxious Receiving good care? yes Needs from Spiritual Care:  prayer Ministry of presence & prayer to demonstrate caring & concern, convey emotional & spiritual support.  
 
Chaplain Sharmaine Coyle MDiv,ThM,PhD

## 2019-04-15 NOTE — PROGRESS NOTES
Hospitalist Progress Note Patient: Benjamin Ureña MRN: [id number]  SSN: [id number] YOB: 1945  Age: 76 y.o. Sex: female Admit Date: 4/14/2019 LOS: 1 day Subjective:  
 
From previous notes: \"67 y.o. Female with PMH Afib on Eliquis, DM II and congenital kidney d(with 1 kidney) was admitted for left ankle fracture after she sustained a fall when she off her bed. Pt is laying in bed with left ankle splint on. Reports she is hurting at her right ribs where she hit it at a night stand when she fell. Right rib pain is worse only when she takes a deep breath. Has some cough, but denies shortness of breath or chest pain. \" 
 
4/15 - She continues to complain of right ankle pain. Has some wheezing this AM. Melani SOB. Denies F/C/N/V. Review of systems negative except stated above. Objective:  
 
Visit Vitals /84 (BP 1 Location: Right arm, BP Patient Position: At rest) Pulse 98 Temp 98.4 °F (36.9 °C) Resp 18 Ht 5' 11\" (1.803 m) Wt 107 kg (236 lb) SpO2 91% BMI 32.92 kg/m² Oxygen Therapy O2 Sat (%): 91 % (04/15/19 0733) Pulse via Oximetry: 97 beats per minute (04/14/19 1021) O2 Device: Room air (04/14/19 0148) Intake and Output: No intake or output data in the 24 hours ending 04/15/19 0841 Physical Exam:  
GENERAL: alert, cooperative, no distress, appears stated age EYE: conjunctivae/corneas clear. PERRL. THROAT & NECK: normal and no erythema or exudates noted. LUNG: Expiratory wheeze HEART: regular rate and rhythm, S1S2, no murmur, no JVD ABDOMEN: soft, non-tender, non-distended. Bowel sounds normal.  
EXTREMITIES:  Right leg in ACE wrap SKIN: no rash or abnormalities NEUROLOGIC: A&Ox3. Cranial nerves 2-12 grossly intact. Lab/Data Review: 
Recent Results (from the past 24 hour(s)) GLUCOSE, POC Collection Time: 04/14/19 12:22 PM  
Result Value Ref Range Glucose (POC) 209 (H) 65 - 100 mg/dL GLUCOSE, POC  
 Collection Time: 04/14/19  4:05 PM  
Result Value Ref Range Glucose (POC) 213 (H) 65 - 100 mg/dL GLUCOSE, POC Collection Time: 04/14/19  9:35 PM  
Result Value Ref Range Glucose (POC) 168 (H) 65 - 100 mg/dL METABOLIC PANEL, COMPREHENSIVE Collection Time: 04/15/19  5:08 AM  
Result Value Ref Range Sodium 139 136 - 145 mmol/L Potassium 4.3 3.5 - 5.1 mmol/L Chloride 105 98 - 107 mmol/L  
 CO2 26 21 - 32 mmol/L Anion gap 8 7 - 16 mmol/L Glucose 186 (H) 65 - 100 mg/dL BUN 19 8 - 23 MG/DL Creatinine 1.05 (H) 0.6 - 1.0 MG/DL  
 GFR est AA >60 >60 ml/min/1.73m2 GFR est non-AA 54 (L) >60 ml/min/1.73m2 Calcium 9.2 8.3 - 10.4 MG/DL Bilirubin, total 0.5 0.2 - 1.1 MG/DL  
 ALT (SGPT) 13 12 - 65 U/L  
 AST (SGOT) 19 15 - 37 U/L Alk. phosphatase 65 50 - 136 U/L Protein, total 7.1 6.3 - 8.2 g/dL Albumin 2.9 (L) 3.2 - 4.6 g/dL Globulin 4.2 (H) 2.3 - 3.5 g/dL A-G Ratio 0.7 (L) 1.2 - 3.5    
CBC WITH AUTOMATED DIFF Collection Time: 04/15/19  5:08 AM  
Result Value Ref Range WBC 8.2 4.3 - 11.1 K/uL  
 RBC 4.55 4.05 - 5.2 M/uL  
 HGB 12.4 11.7 - 15.4 g/dL HCT 41.5 35.8 - 46.3 % MCV 91.2 79.6 - 97.8 FL  
 MCH 27.3 26.1 - 32.9 PG  
 MCHC 29.9 (L) 31.4 - 35.0 g/dL  
 RDW 14.4 11.9 - 14.6 % PLATELET 748 631 - 690 K/uL MPV 11.2 9.4 - 12.3 FL ABSOLUTE NRBC 0.00 0.0 - 0.2 K/uL  
 DF AUTOMATED NEUTROPHILS 62 43 - 78 % LYMPHOCYTES 22 13 - 44 % MONOCYTES 11 4.0 - 12.0 % EOSINOPHILS 5 0.5 - 7.8 % BASOPHILS 1 0.0 - 2.0 % IMMATURE GRANULOCYTES 1 0.0 - 5.0 %  
 ABS. NEUTROPHILS 5.0 1.7 - 8.2 K/UL  
 ABS. LYMPHOCYTES 1.8 0.5 - 4.6 K/UL  
 ABS. MONOCYTES 0.9 0.1 - 1.3 K/UL  
 ABS. EOSINOPHILS 0.4 0.0 - 0.8 K/UL  
 ABS. BASOPHILS 0.1 0.0 - 0.2 K/UL  
 ABS. IMM. GRANS. 0.0 0.0 - 0.5 K/UL GLUCOSE, POC Collection Time: 04/15/19  7:32 AM  
Result Value Ref Range Glucose (POC) 192 (H) 65 - 100 mg/dL Imaging: Xr Ankle Lt Min 3 V 
 
 Result Date: 4/14/2019 AP, lateral, oblique views left ankle INDICATION: Trauma. COMPARISON: None FINDINGS: There is fracture of the lateral malleolus, at the level of the tibiofibular syndesmosis. Distal fracture fragment is slightly displaced laterally. Ankle mortise appears maintained. There is ankle soft tissue swelling. No medial or posterior malleolar fracture. Post surgical changes in the midfoot, partially seen. IMPRESSION: 1. Stewart B lateral malleolar fracture. Ct Head Wo Cont Result Date: 4/14/2019 Noncontrast CT of the brain. COMPARISON: February 19, 2018 INDICATION: Head trauma. Hit head. TECHNIQUE: Contiguous axial images were obtained from the skull base through the vertex without IV contrast. Radiation dose reduction techniques were used for this study:  Our CT scanners use one or all of the following: Automated exposure control, adjustment of the mA and/or kVp according to patient's size, iterative reconstruction. FINDINGS: There is no acute intracranial hemorrhage or evidence for acute territorial infarction. There is no mass effect, midline shift or hydrocephalus. There is no extra-axial fluid collection. The cerebellum and brainstem are grossly unremarkable. Periventricular diffuse hypodensities are nonspecific and likely secondary to chronic small vessel changes. Mild generalized cerebral volume loss, age-related Included globes appear intact. Partially visualized paranasal sinuses and mastoid air cells are aerated. Surrounding bones are intact. IMPRESSION: 1. Stable chronic findings. No CT evidence of acute intracranial pathology. Xr Ribs Bi W Pa Chest 4 Vs 
 
Result Date: 4/14/2019 Clinical history: Right rib pain after fall. TECHNIQUE: Frontal chest and bilateral rib x-rays. FINDINGS: The heart is enlarged. Mediastinal contour is within normal limits. The left-sided cardiac pacer. No pneumothorax, pulmonary edema or pleural effusion. There is no displaced rib fracture. Body habitus diminishes sensitivity. IMPRESSION: 1. No displaced rib fracture is seen. 2. No pulmonary consolidation or pneumothorax. No results found for this visit on 04/14/19. Cultures: All Micro Results None Assessment/Plan:  
 
Principal Problem: Ankle fracture (4/14/2019) - To go to OR for ORIF on 4/16/19 
- PT/OT 
- Pain control Active Problems: Hypoxia (10/2/2017) - I suspect this is due to COPD/chronic bronchitis - Will start TID Albuterol since wheezing 
- Continue to monitor Fall (4/14/2019) - With ankle fracture - To OR tomorrow 
- PT/OT 
 
  GERD (gastroesophageal reflux disease) (4/17/2013) - Add PPI since going to OR 
 
  DM type 2 (diabetes mellitus, type 2) ([address] (4/17/2013) - Stable - Continue Lantus 10U 
- Continue Humalog SSI Essential hypertension (10/21/2016) Paroxysmal atrial fibrillation ([address] (6/6/2017) - Holding Eliquis - No rate control meds Chronic bronchitis ([address] (4/17/2013) Anxiety (4/17/2013) - Continue Klonopin Pacemaker (1/13/2017) Morbid obesity ([address] (10/2/2017) Dispo - Add Albuterol. To go to OR tomorrow for ORIF. Discharge to rehab post-op. DIET NPO 
DIET DIABETIC CONSISTENT CARB Regular DVT Prophylaxis: SCDs (restart Eliquis when ok with Ortho) Signed By: Linda Hubbard DO April 15, 2019

## 2019-04-15 NOTE — PROGRESS NOTES
Interdisciplinary team rounds were held 4/15/2019 with the following team members:Care Management, Nursing, Occupational Therapy, Physician and   and the patient and family. Surgery is scheduled for tomorrow. Anticipate discharge to [facility]; patient prefers [facility]. Plan of care discussed. See clinical pathway and/or care plan for interventions and desired outcomes.

## 2019-04-16 ENCOUNTER — APPOINTMENT (OUTPATIENT)
Dept: GENERAL RADIOLOGY | Age: 74
DRG: 492 | End: 2019-04-16
Attending: INTERNAL MEDICINE
Payer: MEDICARE

## 2019-04-16 LAB
ANION GAP SERPL CALC-SCNC: 5 MMOL/L (ref 7–16)
BASOPHILS # BLD: 0.1 K/UL (ref 0–0.2)
BASOPHILS NFR BLD: 1 % (ref 0–2)
BUN SERPL-MCNC: 19 MG/DL (ref 8–23)
CALCIUM SERPL-MCNC: 9.6 MG/DL (ref 8.3–10.4)
CHLORIDE SERPL-SCNC: 104 MMOL/L (ref 98–107)
CO2 SERPL-SCNC: 28 MMOL/L (ref 21–32)
CREAT SERPL-MCNC: 1.05 MG/DL (ref 0.6–1)
DIFFERENTIAL METHOD BLD: ABNORMAL
EOSINOPHIL # BLD: 0.3 K/UL (ref 0–0.8)
EOSINOPHIL NFR BLD: 4 % (ref 0.5–7.8)
ERYTHROCYTE [DISTWIDTH] IN BLOOD BY AUTOMATED COUNT: 14.3 % (ref 11.9–14.6)
GLUCOSE BLD STRIP.AUTO-MCNC: 153 MG/DL (ref 65–100)
GLUCOSE BLD STRIP.AUTO-MCNC: 193 MG/DL (ref 65–100)
GLUCOSE BLD STRIP.AUTO-MCNC: 197 MG/DL (ref 65–100)
GLUCOSE BLD STRIP.AUTO-MCNC: 221 MG/DL (ref 65–100)
GLUCOSE BLD STRIP.AUTO-MCNC: 244 MG/DL (ref 65–100)
GLUCOSE SERPL-MCNC: 247 MG/DL (ref 65–100)
HCT VFR BLD AUTO: 41.4 % (ref 35.8–46.3)
HGB BLD-MCNC: 12 G/DL (ref 11.7–15.4)
IMM GRANULOCYTES # BLD AUTO: 0 K/UL (ref 0–0.5)
IMM GRANULOCYTES NFR BLD AUTO: 0 % (ref 0–5)
LYMPHOCYTES # BLD: 1.1 K/UL (ref 0.5–4.6)
LYMPHOCYTES NFR BLD: 16 % (ref 13–44)
MCH RBC QN AUTO: 27.4 PG (ref 26.1–32.9)
MCHC RBC AUTO-ENTMCNC: 29 G/DL (ref 31.4–35)
MCV RBC AUTO: 94.5 FL (ref 79.6–97.8)
MM INDURATION POC: 0 MM (ref 0–5)
MONOCYTES # BLD: 0.7 K/UL (ref 0.1–1.3)
MONOCYTES NFR BLD: 9 % (ref 4–12)
NEUTS SEG # BLD: 4.9 K/UL (ref 1.7–8.2)
NEUTS SEG NFR BLD: 69 % (ref 43–78)
NRBC # BLD: 0 K/UL (ref 0–0.2)
PLATELET # BLD AUTO: 214 K/UL (ref 150–450)
PMV BLD AUTO: 11 FL (ref 9.4–12.3)
POTASSIUM SERPL-SCNC: 4.4 MMOL/L (ref 3.5–5.1)
PPD POC: NEGATIVE NEGATIVE
RBC # BLD AUTO: 4.38 M/UL (ref 4.05–5.2)
SODIUM SERPL-SCNC: 137 MMOL/L (ref 136–145)
WBC # BLD AUTO: 7.1 K/UL (ref 4.3–11.1)

## 2019-04-16 PROCEDURE — 77030031139 HC SUT VCRL2 J&J -A: Performed by: ORTHOPAEDIC SURGERY

## 2019-04-16 PROCEDURE — 0QSK04Z REPOSITION LEFT FIBULA WITH INTERNAL FIXATION DEVICE, OPEN APPROACH: ICD-10-PCS | Performed by: ORTHOPAEDIC SURGERY

## 2019-04-16 PROCEDURE — 77030020255 HC SOL INJ LR 1000ML BG

## 2019-04-16 PROCEDURE — C1713 ANCHOR/SCREW BN/BN,TIS/BN: HCPCS | Performed by: ORTHOPAEDIC SURGERY

## 2019-04-16 PROCEDURE — 74011636637 HC RX REV CODE- 636/637: Performed by: FAMILY MEDICINE

## 2019-04-16 PROCEDURE — 74011000250 HC RX REV CODE- 250: Performed by: INTERNAL MEDICINE

## 2019-04-16 PROCEDURE — 76210000016 HC OR PH I REC 1 TO 1.5 HR: Performed by: ORTHOPAEDIC SURGERY

## 2019-04-16 PROCEDURE — 73600 X-RAY EXAM OF ANKLE: CPT

## 2019-04-16 PROCEDURE — 77030002933 HC SUT MCRYL J&J -A: Performed by: ORTHOPAEDIC SURGERY

## 2019-04-16 PROCEDURE — 82962 GLUCOSE BLOOD TEST: CPT

## 2019-04-16 PROCEDURE — 36415 COLL VENOUS BLD VENIPUNCTURE: CPT

## 2019-04-16 PROCEDURE — 74011250637 HC RX REV CODE- 250/637: Performed by: ORTHOPAEDIC SURGERY

## 2019-04-16 PROCEDURE — 76060000034 HC ANESTHESIA 1.5 TO 2 HR: Performed by: ORTHOPAEDIC SURGERY

## 2019-04-16 PROCEDURE — 76942 ECHO GUIDE FOR BIOPSY: CPT | Performed by: ORTHOPAEDIC SURGERY

## 2019-04-16 PROCEDURE — 77030018836 HC SOL IRR NACL ICUM -A: Performed by: ORTHOPAEDIC SURGERY

## 2019-04-16 PROCEDURE — 76010010054 HC POST OP PAIN BLOCK: Performed by: ORTHOPAEDIC SURGERY

## 2019-04-16 PROCEDURE — 94640 AIRWAY INHALATION TREATMENT: CPT

## 2019-04-16 PROCEDURE — 85025 COMPLETE CBC W/AUTO DIFF WBC: CPT

## 2019-04-16 PROCEDURE — 74011250636 HC RX REV CODE- 250/636

## 2019-04-16 PROCEDURE — 80048 BASIC METABOLIC PNL TOTAL CA: CPT

## 2019-04-16 PROCEDURE — 0SSG04Z REPOSITION LEFT ANKLE JOINT WITH INTERNAL FIXATION DEVICE, OPEN APPROACH: ICD-10-PCS | Performed by: ORTHOPAEDIC SURGERY

## 2019-04-16 PROCEDURE — 74011250637 HC RX REV CODE- 250/637: Performed by: FAMILY MEDICINE

## 2019-04-16 PROCEDURE — 77030000032 HC CUF TRNQT ZIMM -B: Performed by: ORTHOPAEDIC SURGERY

## 2019-04-16 PROCEDURE — 65270000029 HC RM PRIVATE

## 2019-04-16 PROCEDURE — 77030010509 HC AIRWY LMA MSK TELE -A: Performed by: ANESTHESIOLOGY

## 2019-04-16 PROCEDURE — 77030003862 HC BIT DRL SYNT -B: Performed by: ORTHOPAEDIC SURGERY

## 2019-04-16 PROCEDURE — 74011000250 HC RX REV CODE- 250

## 2019-04-16 PROCEDURE — 77030032490 HC SLV COMPR SCD KNE COVD -B

## 2019-04-16 PROCEDURE — 77030021122 HC SPLNT MAT FST BSNM -A: Performed by: ORTHOPAEDIC SURGERY

## 2019-04-16 PROCEDURE — 94760 N-INVAS EAR/PLS OXIMETRY 1: CPT

## 2019-04-16 PROCEDURE — 74011250636 HC RX REV CODE- 250/636: Performed by: ANESTHESIOLOGY

## 2019-04-16 PROCEDURE — 77030008467 HC STPLR SKN COVD -B: Performed by: ORTHOPAEDIC SURGERY

## 2019-04-16 PROCEDURE — 77030029637: Performed by: ORTHOPAEDIC SURGERY

## 2019-04-16 PROCEDURE — 77010033678 HC OXYGEN DAILY

## 2019-04-16 PROCEDURE — 77030003602 HC NDL NRV BLK BBMI -B: Performed by: ANESTHESIOLOGY

## 2019-04-16 PROCEDURE — 74011250636 HC RX REV CODE- 250/636: Performed by: ORTHOPAEDIC SURGERY

## 2019-04-16 PROCEDURE — 74011000258 HC RX REV CODE- 258: Performed by: ORTHOPAEDIC SURGERY

## 2019-04-16 PROCEDURE — 76010000161 HC OR TIME 1 TO 1.5 HR INTENSV-TIER 1: Performed by: ORTHOPAEDIC SURGERY

## 2019-04-16 PROCEDURE — 77030016472 HC BIT DRL QC2 SYNT -C: Performed by: ORTHOPAEDIC SURGERY

## 2019-04-16 PROCEDURE — 77030000031 HC BIT DRL QC SYNT -C: Performed by: ORTHOPAEDIC SURGERY

## 2019-04-16 DEVICE — PLATE BNE L112MM 6 H L DST LAT FIBULAR S STL LOK COMPR FOR: Type: IMPLANTABLE DEVICE | Site: FIBULA | Status: FUNCTIONAL

## 2019-04-16 DEVICE — SCREW BNE L26MM DIA2.7MM CORT S STL ST FULL THRD FOR SM: Type: IMPLANTABLE DEVICE | Site: FIBULA | Status: FUNCTIONAL

## 2019-04-16 DEVICE — 3.5MM LOCKING SCREW SLF-TPNG W/STARDRIVE(TM) RECESS 12MM: Type: IMPLANTABLE DEVICE | Site: FIBULA | Status: FUNCTIONAL

## 2019-04-16 DEVICE — SCREW BNE L14MM DIA2.7MM CORT S STL ST LOK FULL THRD T8: Type: IMPLANTABLE DEVICE | Site: FIBULA | Status: FUNCTIONAL

## 2019-04-16 DEVICE — SCREW BNE L16MM DIA2.7MM CORT S STL ST LOK FULL THRD T8: Type: IMPLANTABLE DEVICE | Site: FIBULA | Status: FUNCTIONAL

## 2019-04-16 DEVICE — 3.5MM CORTEX SCREW SELF-TAPPING 14MM: Type: IMPLANTABLE DEVICE | Site: FIBULA | Status: FUNCTIONAL

## 2019-04-16 DEVICE — KIT REP TI KNOTLESS SYNDESMOSIS CANN 3.7MM DRL BIT DRL GUID: Type: IMPLANTABLE DEVICE | Site: FIBULA | Status: FUNCTIONAL

## 2019-04-16 DEVICE — SCREW BNE L10MM DIA3.5MM CORT S STL ST LOK FULL THRD: Type: IMPLANTABLE DEVICE | Site: FIBULA | Status: FUNCTIONAL

## 2019-04-16 DEVICE — SCREW BNE L12MM DIA2.7MM CORT S STL ST LOK FULL THRD T8: Type: IMPLANTABLE DEVICE | Site: FIBULA | Status: FUNCTIONAL

## 2019-04-16 RX ORDER — SODIUM CHLORIDE, SODIUM LACTATE, POTASSIUM CHLORIDE, CALCIUM CHLORIDE 600; 310; 30; 20 MG/100ML; MG/100ML; MG/100ML; MG/100ML
125 INJECTION, SOLUTION INTRAVENOUS CONTINUOUS
Status: DISCONTINUED | OUTPATIENT
Start: 2019-04-16 | End: 2019-04-16 | Stop reason: HOSPADM

## 2019-04-16 RX ORDER — MIDAZOLAM HYDROCHLORIDE 1 MG/ML
2 INJECTION, SOLUTION INTRAMUSCULAR; INTRAVENOUS
Status: DISCONTINUED | OUTPATIENT
Start: 2019-04-16 | End: 2019-04-16 | Stop reason: SDUPTHER

## 2019-04-16 RX ORDER — LIDOCAINE HYDROCHLORIDE 20 MG/ML
INJECTION, SOLUTION EPIDURAL; INFILTRATION; INTRACAUDAL; PERINEURAL AS NEEDED
Status: DISCONTINUED | OUTPATIENT
Start: 2019-04-16 | End: 2019-04-16 | Stop reason: HOSPADM

## 2019-04-16 RX ORDER — MIDAZOLAM HYDROCHLORIDE 1 MG/ML
2 INJECTION, SOLUTION INTRAMUSCULAR; INTRAVENOUS ONCE
Status: DISCONTINUED | OUTPATIENT
Start: 2019-04-16 | End: 2019-04-16 | Stop reason: HOSPADM

## 2019-04-16 RX ORDER — SODIUM CHLORIDE 0.9 % (FLUSH) 0.9 %
5-40 SYRINGE (ML) INJECTION AS NEEDED
Status: DISCONTINUED | OUTPATIENT
Start: 2019-04-16 | End: 2019-04-22 | Stop reason: HOSPADM

## 2019-04-16 RX ORDER — BUPIVACAINE HYDROCHLORIDE AND EPINEPHRINE 2.5; 5 MG/ML; UG/ML
INJECTION, SOLUTION EPIDURAL; INFILTRATION; INTRACAUDAL; PERINEURAL AS NEEDED
Status: DISCONTINUED | OUTPATIENT
Start: 2019-04-16 | End: 2019-04-16 | Stop reason: HOSPADM

## 2019-04-16 RX ORDER — ACETAMINOPHEN 325 MG/1
650 TABLET ORAL EVERY 8 HOURS
Status: DISCONTINUED | OUTPATIENT
Start: 2019-04-16 | End: 2019-04-22 | Stop reason: HOSPADM

## 2019-04-16 RX ORDER — METOPROLOL TARTRATE 25 MG/1
25 TABLET, FILM COATED ORAL
Status: DISCONTINUED | OUTPATIENT
Start: 2019-04-16 | End: 2019-04-22 | Stop reason: HOSPADM

## 2019-04-16 RX ORDER — BUPIVACAINE HYDROCHLORIDE AND EPINEPHRINE 5; 5 MG/ML; UG/ML
INJECTION, SOLUTION EPIDURAL; INTRACAUDAL; PERINEURAL AS NEEDED
Status: DISCONTINUED | OUTPATIENT
Start: 2019-04-16 | End: 2019-04-16 | Stop reason: HOSPADM

## 2019-04-16 RX ORDER — LIDOCAINE HYDROCHLORIDE 10 MG/ML
0.1 INJECTION INFILTRATION; PERINEURAL AS NEEDED
Status: DISCONTINUED | OUTPATIENT
Start: 2019-04-16 | End: 2019-04-16 | Stop reason: SDUPTHER

## 2019-04-16 RX ORDER — HYDROMORPHONE HYDROCHLORIDE 2 MG/ML
0.5 INJECTION, SOLUTION INTRAMUSCULAR; INTRAVENOUS; SUBCUTANEOUS
Status: DISCONTINUED | OUTPATIENT
Start: 2019-04-16 | End: 2019-04-16 | Stop reason: SDUPTHER

## 2019-04-16 RX ORDER — FENTANYL CITRATE 50 UG/ML
100 INJECTION, SOLUTION INTRAMUSCULAR; INTRAVENOUS ONCE
Status: DISCONTINUED | OUTPATIENT
Start: 2019-04-16 | End: 2019-04-16 | Stop reason: HOSPADM

## 2019-04-16 RX ORDER — DIPHENHYDRAMINE HYDROCHLORIDE 50 MG/ML
12.5 INJECTION, SOLUTION INTRAMUSCULAR; INTRAVENOUS
Status: DISCONTINUED | OUTPATIENT
Start: 2019-04-16 | End: 2019-04-16 | Stop reason: SDUPTHER

## 2019-04-16 RX ORDER — NALOXONE HYDROCHLORIDE 0.4 MG/ML
0.1 INJECTION, SOLUTION INTRAMUSCULAR; INTRAVENOUS; SUBCUTANEOUS AS NEEDED
Status: DISCONTINUED | OUTPATIENT
Start: 2019-04-16 | End: 2019-04-16 | Stop reason: ALTCHOICE

## 2019-04-16 RX ORDER — CLONAZEPAM 0.5 MG/1
1 TABLET ORAL
Status: DISCONTINUED | OUTPATIENT
Start: 2019-04-17 | End: 2019-04-17

## 2019-04-16 RX ORDER — OXYCODONE HYDROCHLORIDE 5 MG/1
5-10 TABLET ORAL
Status: DISCONTINUED | OUTPATIENT
Start: 2019-04-16 | End: 2019-04-22 | Stop reason: HOSPADM

## 2019-04-16 RX ORDER — FLUMAZENIL 0.1 MG/ML
0.2 INJECTION INTRAVENOUS
Status: DISCONTINUED | OUTPATIENT
Start: 2019-04-16 | End: 2019-04-16 | Stop reason: SDUPTHER

## 2019-04-16 RX ORDER — SODIUM CHLORIDE, SODIUM LACTATE, POTASSIUM CHLORIDE, CALCIUM CHLORIDE 600; 310; 30; 20 MG/100ML; MG/100ML; MG/100ML; MG/100ML
75 INJECTION, SOLUTION INTRAVENOUS CONTINUOUS
Status: DISCONTINUED | OUTPATIENT
Start: 2019-04-16 | End: 2019-04-16 | Stop reason: SDUPTHER

## 2019-04-16 RX ORDER — SODIUM CHLORIDE, SODIUM LACTATE, POTASSIUM CHLORIDE, CALCIUM CHLORIDE 600; 310; 30; 20 MG/100ML; MG/100ML; MG/100ML; MG/100ML
125 INJECTION, SOLUTION INTRAVENOUS CONTINUOUS
Status: DISCONTINUED | OUTPATIENT
Start: 2019-04-16 | End: 2019-04-16 | Stop reason: ALTCHOICE

## 2019-04-16 RX ORDER — ONDANSETRON 2 MG/ML
INJECTION INTRAMUSCULAR; INTRAVENOUS AS NEEDED
Status: DISCONTINUED | OUTPATIENT
Start: 2019-04-16 | End: 2019-04-16 | Stop reason: HOSPADM

## 2019-04-16 RX ORDER — PROPOFOL 10 MG/ML
INJECTION, EMULSION INTRAVENOUS AS NEEDED
Status: DISCONTINUED | OUTPATIENT
Start: 2019-04-16 | End: 2019-04-16 | Stop reason: HOSPADM

## 2019-04-16 RX ORDER — SODIUM CHLORIDE 0.9 % (FLUSH) 0.9 %
5-40 SYRINGE (ML) INJECTION EVERY 8 HOURS
Status: DISCONTINUED | OUTPATIENT
Start: 2019-04-16 | End: 2019-04-22 | Stop reason: HOSPADM

## 2019-04-16 RX ORDER — NALOXONE HYDROCHLORIDE 0.4 MG/ML
0.4 INJECTION, SOLUTION INTRAMUSCULAR; INTRAVENOUS; SUBCUTANEOUS AS NEEDED
Status: DISCONTINUED | OUTPATIENT
Start: 2019-04-16 | End: 2019-04-22 | Stop reason: HOSPADM

## 2019-04-16 RX ORDER — NALOXONE HYDROCHLORIDE 0.4 MG/ML
0.1 INJECTION, SOLUTION INTRAMUSCULAR; INTRAVENOUS; SUBCUTANEOUS
Status: DISCONTINUED | OUTPATIENT
Start: 2019-04-16 | End: 2019-04-16 | Stop reason: SDUPTHER

## 2019-04-16 RX ORDER — HYDROMORPHONE HYDROCHLORIDE 2 MG/ML
0.5 INJECTION, SOLUTION INTRAMUSCULAR; INTRAVENOUS; SUBCUTANEOUS
Status: DISCONTINUED | OUTPATIENT
Start: 2019-04-16 | End: 2019-04-16 | Stop reason: ALTCHOICE

## 2019-04-16 RX ORDER — SODIUM CHLORIDE, SODIUM LACTATE, POTASSIUM CHLORIDE, CALCIUM CHLORIDE 600; 310; 30; 20 MG/100ML; MG/100ML; MG/100ML; MG/100ML
75 INJECTION, SOLUTION INTRAVENOUS CONTINUOUS
Status: DISCONTINUED | OUTPATIENT
Start: 2019-04-16 | End: 2019-04-17

## 2019-04-16 RX ORDER — MAG HYDROX/ALUMINUM HYD/SIMETH 200-200-20
30 SUSPENSION, ORAL (FINAL DOSE FORM) ORAL
Status: DISCONTINUED | OUTPATIENT
Start: 2019-04-16 | End: 2019-04-22 | Stop reason: HOSPADM

## 2019-04-16 RX ORDER — CLONAZEPAM 0.5 MG/1
2 TABLET ORAL EVERY EVENING
Status: DISCONTINUED | OUTPATIENT
Start: 2019-04-17 | End: 2019-04-16

## 2019-04-16 RX ORDER — OXYCODONE HYDROCHLORIDE 5 MG/1
5 TABLET ORAL
Status: DISCONTINUED | OUTPATIENT
Start: 2019-04-16 | End: 2019-04-16 | Stop reason: ALTCHOICE

## 2019-04-16 RX ORDER — SODIUM CHLORIDE, SODIUM LACTATE, POTASSIUM CHLORIDE, CALCIUM CHLORIDE 600; 310; 30; 20 MG/100ML; MG/100ML; MG/100ML; MG/100ML
100 INJECTION, SOLUTION INTRAVENOUS CONTINUOUS
Status: DISCONTINUED | OUTPATIENT
Start: 2019-04-16 | End: 2019-04-16 | Stop reason: SDUPTHER

## 2019-04-16 RX ORDER — OXYCODONE HYDROCHLORIDE 5 MG/1
5 TABLET ORAL
Status: DISCONTINUED | OUTPATIENT
Start: 2019-04-16 | End: 2019-04-16 | Stop reason: SDUPTHER

## 2019-04-16 RX ORDER — ONDANSETRON 2 MG/ML
4 INJECTION INTRAMUSCULAR; INTRAVENOUS
Status: DISCONTINUED | OUTPATIENT
Start: 2019-04-16 | End: 2019-04-22 | Stop reason: HOSPADM

## 2019-04-16 RX ADMIN — PROPOFOL 150 MG: 10 INJECTION, EMULSION INTRAVENOUS at 11:20

## 2019-04-16 RX ADMIN — Medication 10 ML: at 14:26

## 2019-04-16 RX ADMIN — ALBUTEROL SULFATE 2.5 MG: 2.5 SOLUTION RESPIRATORY (INHALATION) at 19:24

## 2019-04-16 RX ADMIN — Medication 5 ML: at 05:14

## 2019-04-16 RX ADMIN — HYDROCODONE BITARTRATE AND ACETAMINOPHEN 1 TABLET: 5; 325 TABLET ORAL at 05:09

## 2019-04-16 RX ADMIN — INSULIN GLARGINE 10 UNITS: 100 INJECTION, SOLUTION SUBCUTANEOUS at 17:52

## 2019-04-16 RX ADMIN — INSULIN LISPRO 2 UNITS: 100 INJECTION, SOLUTION INTRAVENOUS; SUBCUTANEOUS at 17:52

## 2019-04-16 RX ADMIN — CLONAZEPAM 2 MG: 0.5 TABLET ORAL at 07:41

## 2019-04-16 RX ADMIN — BUPIVACAINE HYDROCHLORIDE AND EPINEPHRINE 5 ML: 5; 5 INJECTION, SOLUTION EPIDURAL; INTRACAUDAL; PERINEURAL at 10:47

## 2019-04-16 RX ADMIN — Medication 2 G: at 11:31

## 2019-04-16 RX ADMIN — BUPIVACAINE HYDROCHLORIDE AND EPINEPHRINE 5 ML: 2.5; 5 INJECTION, SOLUTION EPIDURAL; INFILTRATION; INTRACAUDAL; PERINEURAL at 10:47

## 2019-04-16 RX ADMIN — INSULIN LISPRO 4 UNITS: 100 INJECTION, SOLUTION INTRAVENOUS; SUBCUTANEOUS at 07:35

## 2019-04-16 RX ADMIN — BUPIVACAINE HYDROCHLORIDE AND EPINEPHRINE 20 ML: 2.5; 5 INJECTION, SOLUTION EPIDURAL; INFILTRATION; INTRACAUDAL; PERINEURAL at 10:44

## 2019-04-16 RX ADMIN — SODIUM CHLORIDE, SODIUM LACTATE, POTASSIUM CHLORIDE, AND CALCIUM CHLORIDE 75 ML/HR: 600; 310; 30; 20 INJECTION, SOLUTION INTRAVENOUS at 14:26

## 2019-04-16 RX ADMIN — SODIUM CHLORIDE 1000 MG: 900 INJECTION, SOLUTION INTRAVENOUS at 20:37

## 2019-04-16 RX ADMIN — SODIUM CHLORIDE, SODIUM LACTATE, POTASSIUM CHLORIDE, AND CALCIUM CHLORIDE 125 ML/HR: 600; 310; 30; 20 INJECTION, SOLUTION INTRAVENOUS at 10:26

## 2019-04-16 RX ADMIN — LIDOCAINE HYDROCHLORIDE 80 MG: 20 INJECTION, SOLUTION EPIDURAL; INFILTRATION; INTRACAUDAL; PERINEURAL at 11:20

## 2019-04-16 RX ADMIN — INSULIN LISPRO 4 UNITS: 100 INJECTION, SOLUTION INTRAVENOUS; SUBCUTANEOUS at 21:18

## 2019-04-16 RX ADMIN — ONDANSETRON 4 MG: 2 INJECTION INTRAMUSCULAR; INTRAVENOUS at 11:48

## 2019-04-16 RX ADMIN — Medication 5 ML: at 21:19

## 2019-04-16 RX ADMIN — ACETAMINOPHEN 650 MG: 325 TABLET, FILM COATED ORAL at 17:52

## 2019-04-16 RX ADMIN — ALBUTEROL SULFATE 2.5 MG: 2.5 SOLUTION RESPIRATORY (INHALATION) at 07:50

## 2019-04-16 RX ADMIN — BUPIVACAINE HYDROCHLORIDE AND EPINEPHRINE 20 ML: 5; 5 INJECTION, SOLUTION EPIDURAL; INTRACAUDAL; PERINEURAL at 10:44

## 2019-04-16 NOTE — ANESTHESIA PROCEDURE NOTES
Peripheral Block    Start time: 4/16/2019 10:45 AM  End time: 4/16/2019 10:47 AM  Performed by: Stephany Oscar MD  Authorized by: Stephany Oscar MD       Pre-procedure:    Indications: at surgeon's request and post-op pain management    Preanesthetic Checklist: patient identified, risks and benefits discussed, site marked, timeout performed, anesthesia consent given and patient being monitored    Timeout Time: 10:44          Block Type:   Block Type:  Femoral single shot  Laterality:  Left  Monitoring:  Responsive to questions, standard ASA monitoring, continuous pulse ox, oxygen, frequent vital sign checks and heart rate  Injection Technique:  Single shot  Procedures: ultrasound guided and nerve stimulator    Patient Position: supine  Prep: chlorhexidine    Location:  Upper thigh  Needle Type:  Stimuplex  Needle Gauge:  22 G  Needle Localization:  Nerve stimulator and ultrasound guidance  Motor Response: minimal motor response >0.4 mA      Assessment:  Number of attempts:  1  Injection Assessment:  Incremental injection every 5 mL, no paresthesia, ultrasound image on chart, local visualized surrounding nerve on ultrasound, negative aspiration for blood and no intravascular symptoms  Patient tolerance:  Patient tolerated the procedure well with no immediate complications

## 2019-04-16 NOTE — PROGRESS NOTES
Interdisciplinary team rounds were held 4/16/2019 with the following team members:Care Management, Physical Therapy, Physician and . Anticipate discharge to [facility] [place] when medically ready. Plan of care discussed. See clinical pathway and/or care plan for interventions and desired outcomes.

## 2019-04-16 NOTE — PROGRESS NOTES
Problem: Diabetes Self-Management Goal: *Disease process and treatment process Description Define diabetes and identify own type of diabetes; list 3 options for treating diabetes. 4/15/2019 2301 by Kimberly Covert Outcome: Progressing Towards Goal 
4/15/2019 2301 by Kimberly Covert Outcome: Progressing Towards Goal 
  
Problem: Patient Education: Go to Patient Education Activity Goal: Patient/Family Education 4/15/2019 2301 by Kimberly Covert Outcome: Progressing Towards Goal 
4/15/2019 2301 by Kimberly Covert Outcome: Progressing Towards Goal 
  
Problem: Pressure Injury - Risk of 
Goal: *Prevention of pressure injury Description Document Jatinder Scale and appropriate interventions in the flowsheet. 4/15/2019 2301 by Kimberly Covert Outcome: Progressing Towards Goal 
4/15/2019 2301 by Kimberly Covert Outcome: Progressing Towards Goal 
  
Problem: Patient Education: Go to Patient Education Activity Goal: Patient/Family Education 4/15/2019 2301 by Kimberly Covert Outcome: Progressing Towards Goal 
4/15/2019 2301 by Kimberly Covert Outcome: Progressing Towards Goal 
  
Problem: Falls - Risk of 
Goal: *Absence of Falls Description Document Richardson Villeda Fall Risk and appropriate interventions in the flowsheet. 4/15/2019 2301 by Kimberly Covert Outcome: Progressing Towards Goal 
4/15/2019 2301 by Kimberly Covert Outcome: Progressing Towards Goal 
  
Problem: Patient Education: Go to Patient Education Activity Goal: Patient/Family Education 4/15/2019 2301 by Kimberly Covert Outcome: Progressing Towards Goal 
4/15/2019 2301 by Kimberly Covert Outcome: Progressing Towards Goal 
  
Problem: Interdisciplinary Rounds Goal: Interdisciplinary Rounds 4/15/2019 2301 by Kimberly Covert Outcome: Progressing Towards Goal 
4/15/2019 2301 by Kimberly Covert Outcome: Progressing Towards Goal

## 2019-04-16 NOTE — PROGRESS NOTES
TRANSFER - IN REPORT: 
 
Verbal report received from Sudheer Astorga RN (name) on Sriram Elizalde  being received from PACU (unit) for routine post - op Report consisted of patients Situation, Background, Assessment and  
Recommendations(SBAR). Information from the following report(s) SBAR, Kardex, OR Summary, Procedure Summary, Intake/Output and MAR was reviewed with the receiving nurse. Opportunity for questions and clarification was provided. Assessment completed upon patients arrival to unit and care assumed.

## 2019-04-16 NOTE — PROGRESS NOTES
Physical Therapy Note: 
 
Physical therapy evaluation orders received and chart reviewed. Patient admitted with left distal fibular fracture with plans for surgical repair today. Will hold mobility assessment at this time and see post operatively per orthopedic surgery. Thank you for this consult. Will follow, 
  
Joel Oviedo, PT, DPT 
4/16/19

## 2019-04-16 NOTE — ANESTHESIA POSTPROCEDURE EVALUATION
Procedure(s): FIBULA OPEN REDUCTION INTERNAL FIXATION. total IV anesthesia, general - backup Anesthesia Post Evaluation Patient location during evaluation: PACU Patient participation: complete - patient participated Level of consciousness: responsive to verbal stimuli Pain management: adequate Airway patency: patent Anesthetic complications: no 
Cardiovascular status: acceptable Respiratory status: acceptable Hydration status: euvolemic Vitals Value Taken Time /80 4/16/2019 12:44 PM  
Temp 36.3 °C (97.4 °F) 4/16/2019 12:41 PM  
Pulse 119 4/16/2019 12:47 PM  
Resp 22 4/16/2019 12:41 PM  
SpO2 91 % 4/16/2019 12:47 PM  
Vitals shown include unvalidated device data.

## 2019-04-16 NOTE — PROGRESS NOTES
TRANSFER - OUT REPORT: 
 
Verbal report given to OR nurse(name) on Yamel Connolly  being transferred to OR(unit) for ordered procedure Report consisted of patients Situation, Background, Assessment and  
Recommendations(SBAR). Information from the following report(s) SBAR, Kardex, Procedure Summary, Intake/Output, MAR and Recent Results was reviewed with the receiving nurse. Lines:  
Peripheral IV 04/14/19 Right Antecubital (Active) Site Assessment Clean, dry, & intact 4/15/2019 10:01 PM  
Phlebitis Assessment 0 4/15/2019 10:01 PM  
Infiltration Assessment 0 4/15/2019 10:01 PM  
Dressing Status Clean, dry, & intact 4/15/2019 10:01 PM  
Dressing Type Transparent;Tape 4/15/2019 10:01 PM  
Hub Color/Line Status Patent; Flushed 4/15/2019 10:01 PM  
  
 
Opportunity for questions and clarification was provided. Patient transported with: 
 O2 @ 1 liters

## 2019-04-16 NOTE — ANESTHESIA PREPROCEDURE EVALUATION
Relevant Problems No relevant active problems Anesthetic History Review of Systems / Medical History Patient summary reviewed, nursing notes reviewed and pertinent labs reviewed Pulmonary COPD (chronic bronchitis): moderate Smoker (quit 1 yr ago) Neuro/Psych Cardiovascular Hypertension Dysrhythmias (paroxysmal atrial flutter) Pacemaker and hyperlipidemia Exercise tolerance: <4 METS Comments: Remote hx PE  
GI/Hepatic/Renal 
  
GERD: well controlled PUD (remote hx) Endo/Other Diabetes: poorly controlled, type 2 Hypothyroidism: well controlled Obesity, arthritis and cancer (non-Hodgkin's lymphoma -- in remission) Other Findings Physical Exam 
 
Airway Mallampati: II 
TM Distance: 4 - 6 cm Neck ROM: decreased range of motion Mouth opening: Normal 
 
 Cardiovascular Regular rate and rhythm,  S1 and S2 normal,  no murmur, click, rub, or gallop Dental 
 
 
  
Pulmonary Decreased breath sounds: bilateral 
 
 
 
 
 Abdominal 
GI exam deferred Other Findings Anesthetic Plan ASA: 3 Anesthesia type: total IV anesthesia and general - backup Post-op pain plan if not by surgeon: peripheral nerve block single Anesthetic plan and risks discussed with: Patient

## 2019-04-16 NOTE — ANESTHESIA PROCEDURE NOTES
Peripheral Block    Start time: 4/16/2019 10:37 AM  End time: 4/16/2019 10:44 AM  Performed by: Tabby Nicholas MD  Authorized by: Tabby Nicholas MD       Pre-procedure:    Indications: at surgeon's request and post-op pain management    Preanesthetic Checklist: patient identified, risks and benefits discussed, site marked, timeout performed, anesthesia consent given and patient being monitored    Timeout Time: 10:36          Block Type:   Block Type:  Popliteal  Laterality:  Left  Monitoring:  Responsive to questions, standard ASA monitoring, continuous pulse ox, oxygen, frequent vital sign checks and heart rate  Injection Technique:  Single shot  Procedures: ultrasound guided and nerve stimulator    Patient Position: supine  Prep: chlorhexidine    Location:  Lower thigh  Needle Type:  Stimuplex  Needle Gauge:  22 G  Needle Localization:  Ultrasound guidance and nerve stimulator  Motor Response: minimal motor response >0.4 mA      Assessment:  Number of attempts:  1  Injection Assessment:  Incremental injection every 5 mL, no paresthesia, ultrasound image on chart, no intravascular symptoms, negative aspiration for blood and local visualized surrounding nerve on ultrasound  Patient tolerance:  Patient tolerated the procedure well with no immediate complications

## 2019-04-16 NOTE — PROGRESS NOTES
Hospitalist Progress Note Admit Date:  2019  2:51 AM  
Name:  Cristopher Hall Age:  76 y.o. 
:  1945 MRN:  [id number] PCP:  Lara Herrera MD 
Treatment Team: Attending Provider: Sonia Howell DO; Consulting Provider: Roman Vieira MD; Consulting Provider: Sunil Zacarias DO; Utilization Review: Collins Carver RN; Consulting Provider: Alyssa Barlow MD; Care Manager: Sridhar Tucker Subjective:  
 
From previous notes: \"67 y.o. Female with PMH Afib on Eliquis, DM II and congenital kidney d(with 1 kidney) was admitted for left ankle fracture after she sustained a fall when she off her bed. Pt is laying in bed with left ankle splint on. Reports she is hurting at her right ribs where she hit it at a night stand when she fell. Right rib pain is worse only when she takes a deep breath. Has some cough, but denies shortness of breath or chest pain. \" 
  
 - post op sedate-- orif ankle. Sedate but arousable 
  
Review of systems negative except stated above. Objective:  
 
Patient Vitals for the past 24 hrs: 
 Temp Pulse Resp BP SpO2  
19 1555 98.6 °F (37 °C) (!) 106 18 135/79 93 % 19 1524  (!) 108  138/86 90 % 19 1511  89  148/82 (!) 89 % 19 1456  (!) 103  141/73 93 % 19 1441  93  143/86 90 % 19 1426 98.2 °F (36.8 °C) 95 18 138/85 90 % 19 1329 98 °F (36.7 °C) 99 16 159/74 93 % 19 1328  99   93 % 19 1314  83  166/82 97 % 19 1300  (!) 103  166/82 93 % 19 1254  (!) 122  152/85   
19 1253  (!) 102   93 % 19 1248  (!) 108  152/85 93 % 19 1244  (!) 111  164/80 94 % 19 1241 97.4 °F (36.3 °C) (!) 104 22 161/76 95 % 19 1053  (!) 103 18 169/74 96 % 19 1047  94 18 144/65 95 % 19 0841 97.8 °F (36.6 °C) 96 18 120/81 95 % 19 0752     92 % 19 0705 98.8 °F (37.1 °C) (!) 105 18 123/89 99 % 04/16/19 0324 96.8 °F (36 °C) (!) 109 20 152/81 96 % 04/15/19 2334 97.6 °F (36.4 °C) (!) 102 20 144/84 94 % 04/15/19 2043 96.5 °F (35.8 °C) (!) 105 20 146/63 94 % 04/15/19 2020     92 % Oxygen Therapy O2 Sat (%): 93 % (04/16/19 1555) Pulse via Oximetry: 92 beats per minute (04/16/19 1328) O2 Device: Nasal cannula (04/16/19 1329) O2 Flow Rate (L/min): 3 l/min (04/16/19 1329) Intake/Output Summary (Last 24 hours) at 4/16/2019 1741 Last data filed at 4/16/2019 1336 Gross per 24 hour Intake 1090 ml Output 5 ml Net 1085 ml REVIEW OF SYSTEMS: Comprehensive ROS performed and negative except as stated in HPI. Physical Examination: 
General:    Sedate but arousable from pacu post op. Head:  Normocephalic, atraumatic Eyes:  Pupils small CV:   Irregular rate around 100 known chronic afib Lungs:   Unlabored, no cyanosis Abdomen:   Soft, nondistended, nontender. Extremities: Warm and dry. No cyanosis or edema. Good capillary refill toes Skin:     No rashes or jaundice. Neuro:  No gross focal deficits Data Review: 
I have reviewed all labs, meds, telemetry events, and studies from the last 24 hours. Recent Results (from the past 24 hour(s)) GLUCOSE, POC Collection Time: 04/15/19  8:05 PM  
Result Value Ref Range Glucose (POC) 187 (H) 65 - 100 mg/dL METABOLIC PANEL, BASIC Collection Time: 04/16/19  5:01 AM  
Result Value Ref Range Sodium 137 136 - 145 mmol/L Potassium 4.4 3.5 - 5.1 mmol/L Chloride 104 98 - 107 mmol/L  
 CO2 28 21 - 32 mmol/L Anion gap 5 (L) 7 - 16 mmol/L Glucose 247 (H) 65 - 100 mg/dL BUN 19 8 - 23 MG/DL Creatinine 1.05 (H) 0.6 - 1.0 MG/DL  
 GFR est AA >60 >60 ml/min/1.73m2 GFR est non-AA 54 (L) >60 ml/min/1.73m2 Calcium 9.6 8.3 - 10.4 MG/DL  
CBC WITH AUTOMATED DIFF Collection Time: 04/16/19  5:01 AM  
Result Value Ref Range WBC 7.1 4.3 - 11.1 K/uL  
 RBC 4.38 4.05 - 5.2 M/uL  
 HGB 12.0 11.7 - 15.4 g/dL HCT 41.4 35.8 - 46.3 % MCV 94.5 79.6 - 97.8 FL  
 MCH 27.4 26.1 - 32.9 PG  
 MCHC 29.0 (L) 31.4 - 35.0 g/dL  
 RDW 14.3 11.9 - 14.6 % PLATELET 950 358 - 279 K/uL MPV 11.0 9.4 - 12.3 FL ABSOLUTE NRBC 0.00 0.0 - 0.2 K/uL  
 DF AUTOMATED NEUTROPHILS 69 43 - 78 % LYMPHOCYTES 16 13 - 44 % MONOCYTES 9 4.0 - 12.0 % EOSINOPHILS 4 0.5 - 7.8 % BASOPHILS 1 0.0 - 2.0 % IMMATURE GRANULOCYTES 0 0.0 - 5.0 %  
 ABS. NEUTROPHILS 4.9 1.7 - 8.2 K/UL  
 ABS. LYMPHOCYTES 1.1 0.5 - 4.6 K/UL  
 ABS. MONOCYTES 0.7 0.1 - 1.3 K/UL  
 ABS. EOSINOPHILS 0.3 0.0 - 0.8 K/UL  
 ABS. BASOPHILS 0.1 0.0 - 0.2 K/UL  
 ABS. IMM. GRANS. 0.0 0.0 - 0.5 K/UL GLUCOSE, POC Collection Time: 04/16/19  7:03 AM  
Result Value Ref Range Glucose (POC) 221 (H) 65 - 100 mg/dL GLUCOSE, POC Collection Time: 04/16/19  8:36 AM  
Result Value Ref Range Glucose (POC) 197 (H) 65 - 100 mg/dL GLUCOSE, POC Collection Time: 04/16/19  1:08 PM  
Result Value Ref Range Glucose (POC) 153 (H) 65 - 100 mg/dL PLEASE READ & DOCUMENT PPD TEST IN 48 HRS Collection Time: 04/16/19  3:28 PM  
Result Value Ref Range PPD Negative Negative  
 mm Induration 0 0 - 5 mm GLUCOSE, POC Collection Time: 04/16/19  3:38 PM  
Result Value Ref Range Glucose (POC) 193 (H) 65 - 100 mg/dL All Micro Results None Current Meds: 
Current Facility-Administered Medications Medication Dose Route Frequency  lactated Ringers infusion  75 mL/hr IntraVENous CONTINUOUS  
 sodium chloride (NS) flush 5-40 mL  5-40 mL IntraVENous Q8H  
 sodium chloride (NS) flush 5-40 mL  5-40 mL IntraVENous PRN  
 ceFAZolin (ANCEF) 1,000 mg in 0.9% sodium chloride (MBP/ADV) 50 mL ADV  1 g IntraVENous Q8H  
 acetaminophen (TYLENOL) tablet 650 mg  650 mg Oral Q8H  
 oxyCODONE IR (ROXICODONE) tablet 5-10 mg  5-10 mg Oral Q4H PRN  
 ondansetron (ZOFRAN) injection 4 mg  4 mg IntraVENous Q4H PRN  
  alum-mag hydroxide-simeth (MYLANTA) oral suspension 30 mL  30 mL Oral Q4H PRN  
 albuterol (PROVENTIL VENTOLIN) nebulizer solution 2.5 mg  2.5 mg Nebulization TID RT  
 albuterol (PROVENTIL VENTOLIN) nebulizer solution 2.5 mg  2.5 mg Nebulization Q4H PRN  
 [Held by provider] apixaban (ELIQUIS) tablet 5 mg  5 mg Oral BID  venlafaxine-SR (EFFEXOR-XR) capsule 150 mg  150 mg Oral DAILY  donepezil (ARICEPT) tablet 10 mg  10 mg Oral QHS  insulin glargine (LANTUS) injection 10 Units  10 Units SubCUTAneous ACD  diphenoxylate-atropine (LOMOTIL) tablet 2 Tab  2 Tab Oral QID PRN  
 traZODone (DESYREL) tablet 100 mg  100 mg Oral QHS  clonazePAM (KlonoPIN) tablet 2 mg  2 mg Oral BID  acetaminophen (TYLENOL) tablet 650 mg  650 mg Oral Q4H PRN  
 insulin lispro (HUMALOG) injection   SubCUTAneous AC&HS Diet: DIET DIABETIC CONSISTENT CARB Other Studies (last 24 hours): Xr Ankle Lt Ap/lat Result Date: 4/16/2019 Left ankle radiographs 4/16/2019 CLINICAL HISTORY: ORIF. COMPARISON: Left ankle radiographs 4/14/2019 FINDINGS: 2 intraoperative fluoroscopic spot images are submitted for evaluation. The total fluoroscopy time is indicated to be 30 seconds. These show a lateral compression plate and multiple screws stabilizing the distal fibula. A metallic density is seen along the medial cortex of the distal tibia likely representing an anchor from syndesmotic repair. IMPRESSION: 1. Postoperative changes of the left ankle as described above. Assessment and Plan:  
 
Hospital Problems as of 4/16/2019 Date Reviewed: 4/16/2019 Codes Class Noted - Resolved POA * (Principal) Ankle fracture ICD-10-CM: Y29.079L 
ICD-9-CM: 824.8  4/14/2019 - Present Yes Fall ICD-10-CM: W19. Sunny Mohsen ICD-9-CM: E888.9  4/14/2019 - Present Yes Hypoxia ICD-10-CM: R09.02 
ICD-9-CM: 799.02  10/2/2017 - Present Yes  Overview Signed 10/2/2017  2:28 AM by Marbella Palmer MD  
 Probable COPD + obesity Morbid obesity ([facility] (Chronic) ICD-10-CM: E66.01 
ICD-9-CM: 278.01  10/2/2017 - Present Yes Paroxysmal atrial fibrillation (HCC) (Chronic) ICD-10-CM: I48.0 ICD-9-CM: 427.31  6/6/2017 - Present Yes Pacemaker ICD-10-CM: Z95.0 ICD-9-CM: V45.01  1/13/2017 - Present Yes Overview Signed 1/13/2017  6:14 PM by Joana Flowers  
  1. Biotronik MRI Pacemaker - Jan 2017 Atrial flutter ([facility] (Chronic) ICD-10-CM: H36.75 
ICD-9-CM: 427.32  10/21/2016 - Present Overview Signed 12/22/2016  9:25 AM by Joana Flowers 1. Atrial Flutter - Dec 2015 - Ablation of right-sided isthmus-dependent atypical clock-wise atrial flutter. 2. ARACELY Showed RENO Velocity of 25cm/sec Essential hypertension (Chronic) ICD-10-CM: I10 
ICD-9-CM: 401.9  10/21/2016 - Present Yes Chronic bronchitis ([facility] (Chronic) ICD-10-CM: J07 ICD-9-CM: 491.9  4/17/2013 - Present Yes Anxiety ICD-10-CM: F41.9 ICD-9-CM: 300.00  4/17/2013 - Present Yes GERD (gastroesophageal reflux disease) (Chronic) ICD-10-CM: K21.9 ICD-9-CM: 530.81  4/17/2013 - Present Yes Overview Signed 4/17/2013  4:27 PM by Izzy Paula LPN  
  chronic DM type 2 (diabetes mellitus, type 2) (HCC) (Chronic) ICD-10-CM: E11.9 ICD-9-CM: 250.00  4/17/2013 - Present Yes A/P:   
  Ankle fracture (4/14/2019) 
- s/p ORIFtoday 
- PT/OT 
- Pain control COPD/chronic bronchitis 
- continue Albuterol prn wheezing 
- Continue to monitor 
  
  Fall (4/14/2019) - With ankle fracture 
  
  DM type 2 (diabetes mellitus, type 2) ([address] (4/17/2013) - Continue Lantus 10U 
- Continue Humalog SSI 
  
  Essential hypertension (10/21/2016) 
  
  Paroxysmal atrial fibrillation ([facility] (6/6/2017) - Holding Eliquis - No rate control meds--may need prn metoprolol 
 
  
  Anxiety (4/17/2013) - Continue Klonopin NONE TONIGHT AND WEAN DOSE TO 1MG BID  
  
  Pacemaker (1/13/2017) 
  
 Morbid obesity ([facility] [address] [facility] (10/2/2017) 
 
  
DIET DIABETIC CONSISTENT CARB Regular 
  
DVT Prophylaxis: SCDs (restart Eliquis when ok with Ortho)

## 2019-04-16 NOTE — PROGRESS NOTES
Problem: Diabetes Self-Management Goal: *Disease process and treatment process Description Define diabetes and identify own type of diabetes; list 3 options for treating diabetes. Outcome: Progressing Towards Goal 
Goal: *Incorporating nutritional management into lifestyle Description Describe effect of type, amount and timing of food on blood glucose; list 3 methods for planning meals. Outcome: Progressing Towards Goal 
Goal: *Incorporating physical activity into lifestyle Description State effect of exercise on blood glucose levels. Outcome: Progressing Towards Goal 
Goal: *Developing strategies to promote health/change behavior Description Define the ABC's of diabetes; identify appropriate screenings, schedule and personal plan for screenings. Outcome: Progressing Towards Goal 
Goal: *Using medications safely Description State effect of diabetes medications on diabetes; name diabetes medication taking, action and side effects. Outcome: Progressing Towards Goal 
Goal: *Monitoring blood glucose, interpreting and using results Description Identify recommended blood glucose targets  and personal targets. Outcome: Progressing Towards Goal 
Goal: *Prevention, detection, treatment of acute complications Description List symptoms of hyper- and hypoglycemia; describe how to treat low blood sugar and actions for lowering  high blood glucose level. Outcome: Progressing Towards Goal 
Goal: *Prevention, detection and treatment of chronic complications Description Define the natural course of diabetes and describe the relationship of blood glucose levels to long term complications of diabetes. Outcome: Progressing Towards Goal 
Goal: *Developing strategies to address psychosocial issues Description Describe feelings about living with diabetes; identify support needed and support network Outcome: Progressing Towards Goal 
  
Problem: Patient Education: Go to Patient Education Activity Goal: Patient/Family Education Outcome: Progressing Towards Goal 
  
Problem: Pressure Injury - Risk of 
Goal: *Prevention of pressure injury Description Document Jatinder Scale and appropriate interventions in the flowsheet. Outcome: Progressing Towards Goal 
  
Problem: Falls - Risk of 
Goal: *Absence of Falls Description Document [place] Mychal Fall Risk and appropriate interventions in the flowsheet. Outcome: Progressing Towards Goal 
  
Problem: Interdisciplinary Rounds Goal: Interdisciplinary Rounds Outcome: Progressing Towards Goal 
  
Problem: Patient Education: Go to Patient Education Activity Goal: Patient/Family Education Outcome: Progressing Towards Goal 
  
Problem: Hypertension Goal: *Blood pressure within specified parameters Outcome: Progressing Towards Goal 
Goal: *Fluid volume balance Outcome: Progressing Towards Goal 
Goal: *Labs within defined limits Outcome: Progressing Towards Goal 
  
Problem: Breathing Pattern - Ineffective Goal: *Absence of hypoxia Outcome: Progressing Towards Goal 
Goal: *Use of effective breathing techniques Outcome: Progressing Towards Goal

## 2019-04-16 NOTE — PROGRESS NOTES
Pt's L foot swollen and cool to touch. Doppler of pedal pulse obtained and present. MD called into room and assessed pt. LLE elevated on pillows. No new orders received. Will continue to monitor.

## 2019-04-16 NOTE — PERIOP NOTES
TRANSFER - OUT REPORT: 
 
Verbal report given to LUKASZ Ryan on Nas Olmedo  being transferred to  [phone] for routine post - op Report consisted of patients Situation, Background, Assessment and  
Recommendations(SBAR). Information from the following report(s) SBAR, OR Summary and Intake/Output was reviewed with the receiving nurse. Lines:  
Peripheral IV 04/14/19 Right Antecubital (Active) Site Assessment Clean, dry, & intact 4/16/2019  7:41 AM  
Phlebitis Assessment 0 4/16/2019  7:41 AM  
Infiltration Assessment 0 4/16/2019  7:41 AM  
Dressing Status Clean, dry, & intact 4/16/2019  7:41 AM  
Dressing Type Tape;Transparent 4/16/2019  7:41 AM  
Hub Color/Line Status Flushed;Patent 4/16/2019  7:41 AM  
   
Peripheral IV 04/16/19 Right Wrist (Active) Site Assessment Clean, dry, & intact 4/16/2019  8:41 AM  
Phlebitis Assessment 0 4/16/2019  8:41 AM  
Infiltration Assessment 0 4/16/2019  8:41 AM  
Dressing Status Clean, dry, & intact 4/16/2019  8:41 AM  
Dressing Type Tape;Transparent 4/16/2019  8:41 AM  
Hub Color/Line Status Infusing 4/16/2019  8:41 AM  
   
Peripheral IV Left Wrist (Active) Site Assessment Clean, dry, & intact 4/16/2019 12:46 PM  
Phlebitis Assessment 0 4/16/2019 12:46 PM  
Infiltration Assessment 0 4/16/2019 12:46 PM  
Dressing Status Clean, dry, & intact 4/16/2019 12:46 PM  
Dressing Type Tape;Transparent 4/16/2019 12:46 PM  
Hub Color/Line Status Infusing 4/16/2019 12:46 PM  
  
 
Opportunity for questions and clarification was provided. Patient transported with:O2 at 3 liters

## 2019-04-16 NOTE — PROGRESS NOTES
Problem: Diabetes Self-Management Goal: *Disease process and treatment process Description Define diabetes and identify own type of diabetes; list 3 options for treating diabetes. Outcome: Progressing Towards Goal 
Goal: *Incorporating nutritional management into lifestyle Description Describe effect of type, amount and timing of food on blood glucose; list 3 methods for planning meals. Outcome: Progressing Towards Goal 
Goal: *Incorporating physical activity into lifestyle Description State effect of exercise on blood glucose levels. Outcome: Progressing Towards Goal 
Goal: *Developing strategies to promote health/change behavior Description Define the ABC's of diabetes; identify appropriate screenings, schedule and personal plan for screenings. Outcome: Progressing Towards Goal 
Goal: *Using medications safely Description State effect of diabetes medications on diabetes; name diabetes medication taking, action and side effects. Outcome: Progressing Towards Goal 
Goal: *Monitoring blood glucose, interpreting and using results Description Identify recommended blood glucose targets  and personal targets. Outcome: Progressing Towards Goal 
Goal: *Prevention, detection, treatment of acute complications Description List symptoms of hyper- and hypoglycemia; describe how to treat low blood sugar and actions for lowering  high blood glucose level. Outcome: Progressing Towards Goal 
Goal: *Prevention, detection and treatment of chronic complications Description Define the natural course of diabetes and describe the relationship of blood glucose levels to long term complications of diabetes. Outcome: Progressing Towards Goal 
Goal: *Developing strategies to address psychosocial issues Description Describe feelings about living with diabetes; identify support needed and support network Outcome: Progressing Towards Goal 
  
Problem: Pressure Injury - Risk of 
Goal: *Prevention of pressure injury Description Document Jatinder Scale and appropriate interventions in the flowsheet. Outcome: Progressing Towards Goal 
  
Problem: Falls - Risk of 
Goal: *Absence of Falls Description Document Redgie Curet Fall Risk and appropriate interventions in the flowsheet. Outcome: Progressing Towards Goal 
  
Problem: Interdisciplinary Rounds Goal: Interdisciplinary Rounds Outcome: Progressing Towards Goal

## 2019-04-17 ENCOUNTER — APPOINTMENT (OUTPATIENT)
Dept: GENERAL RADIOLOGY | Age: 74
DRG: 492 | End: 2019-04-17
Attending: INTERNAL MEDICINE
Payer: MEDICARE

## 2019-04-17 LAB
ANION GAP SERPL CALC-SCNC: 7 MMOL/L (ref 7–16)
APPEARANCE UR: CLEAR
ARTERIAL PATENCY WRIST A: YES
BASE EXCESS BLD CALC-SCNC: 4 MMOL/L
BASOPHILS # BLD: 0 K/UL (ref 0–0.2)
BASOPHILS NFR BLD: 0 % (ref 0–2)
BDY SITE: ABNORMAL
BILIRUB UR QL: NEGATIVE
BODY TEMPERATURE: 98.6
BUN SERPL-MCNC: 15 MG/DL (ref 8–23)
CALCIUM SERPL-MCNC: 9.3 MG/DL (ref 8.3–10.4)
CHLORIDE SERPL-SCNC: 101 MMOL/L (ref 98–107)
CO2 BLD-SCNC: 33 MMOL/L
CO2 SERPL-SCNC: 30 MMOL/L (ref 21–32)
COLLECT TIME,HTIME: 1135
COLOR UR: YELLOW
CREAT SERPL-MCNC: 0.99 MG/DL (ref 0.6–1)
DIFFERENTIAL METHOD BLD: ABNORMAL
EOSINOPHIL # BLD: 0 K/UL (ref 0–0.8)
EOSINOPHIL NFR BLD: 0 % (ref 0.5–7.8)
ERYTHROCYTE [DISTWIDTH] IN BLOOD BY AUTOMATED COUNT: 14 % (ref 11.9–14.6)
GAS FLOW.O2 O2 DELIVERY SYS: ABNORMAL L/MIN
GLUCOSE BLD STRIP.AUTO-MCNC: 129 MG/DL (ref 65–100)
GLUCOSE BLD STRIP.AUTO-MCNC: 190 MG/DL (ref 65–100)
GLUCOSE BLD STRIP.AUTO-MCNC: 216 MG/DL (ref 65–100)
GLUCOSE BLD STRIP.AUTO-MCNC: 222 MG/DL (ref 65–100)
GLUCOSE SERPL-MCNC: 203 MG/DL (ref 65–100)
GLUCOSE UR STRIP.AUTO-MCNC: NEGATIVE MG/DL
HCO3 BLD-SCNC: 31.5 MMOL/L (ref 22–26)
HCT VFR BLD AUTO: 42.4 % (ref 35.8–46.3)
HGB BLD-MCNC: 12.2 G/DL (ref 11.7–15.4)
HGB UR QL STRIP: NEGATIVE
IMM GRANULOCYTES # BLD AUTO: 0.1 K/UL (ref 0–0.5)
IMM GRANULOCYTES NFR BLD AUTO: 1 % (ref 0–5)
KETONES UR QL STRIP.AUTO: NEGATIVE MG/DL
LACTATE SERPL-SCNC: 1.7 MMOL/L (ref 0.4–2)
LEUKOCYTE ESTERASE UR QL STRIP.AUTO: NEGATIVE
LYMPHOCYTES # BLD: 0.9 K/UL (ref 0.5–4.6)
LYMPHOCYTES NFR BLD: 9 % (ref 13–44)
MCH RBC QN AUTO: 27.7 PG (ref 26.1–32.9)
MCHC RBC AUTO-ENTMCNC: 28.8 G/DL (ref 31.4–35)
MCV RBC AUTO: 96.4 FL (ref 79.6–97.8)
MM INDURATION POC: 0 MM (ref 0–5)
MONOCYTES # BLD: 1 K/UL (ref 0.1–1.3)
MONOCYTES NFR BLD: 11 % (ref 4–12)
NEUTS SEG # BLD: 7.6 K/UL (ref 1.7–8.2)
NEUTS SEG NFR BLD: 80 % (ref 43–78)
NITRITE UR QL STRIP.AUTO: NEGATIVE
NRBC # BLD: 0 K/UL (ref 0–0.2)
O2/TOTAL GAS SETTING VFR VENT: 0.21 %
PCO2 BLD: 56.8 MMHG (ref 35–45)
PH BLD: 7.35 [PH] (ref 7.35–7.45)
PH UR STRIP: 5 [PH] (ref 5–9)
PLATELET # BLD AUTO: 214 K/UL (ref 150–450)
PMV BLD AUTO: 10.9 FL (ref 9.4–12.3)
PO2 BLD: 43 MMHG (ref 75–100)
POTASSIUM SERPL-SCNC: 4.5 MMOL/L (ref 3.5–5.1)
PPD POC: NEGATIVE NEGATIVE
PROT UR STRIP-MCNC: NEGATIVE MG/DL
RBC # BLD AUTO: 4.4 M/UL (ref 4.05–5.2)
SAO2 % BLD: 74 % (ref 95–98)
SERVICE CMNT-IMP: ABNORMAL
SODIUM SERPL-SCNC: 138 MMOL/L (ref 136–145)
SP GR UR REFRACTOMETRY: 1.01 (ref 1–1.02)
SPECIMEN TYPE: ABNORMAL
UROBILINOGEN UR QL STRIP.AUTO: 0.2 EU/DL (ref 0.2–1)
WBC # BLD AUTO: 9.5 K/UL (ref 4.3–11.1)

## 2019-04-17 PROCEDURE — 74011000258 HC RX REV CODE- 258: Performed by: INTERNAL MEDICINE

## 2019-04-17 PROCEDURE — 74011000250 HC RX REV CODE- 250: Performed by: INTERNAL MEDICINE

## 2019-04-17 PROCEDURE — 74011250637 HC RX REV CODE- 250/637: Performed by: ORTHOPAEDIC SURGERY

## 2019-04-17 PROCEDURE — 87086 URINE CULTURE/COLONY COUNT: CPT

## 2019-04-17 PROCEDURE — 81003 URINALYSIS AUTO W/O SCOPE: CPT

## 2019-04-17 PROCEDURE — 74011250636 HC RX REV CODE- 250/636: Performed by: INTERNAL MEDICINE

## 2019-04-17 PROCEDURE — 83605 ASSAY OF LACTIC ACID: CPT

## 2019-04-17 PROCEDURE — 94640 AIRWAY INHALATION TREATMENT: CPT

## 2019-04-17 PROCEDURE — 85025 COMPLETE CBC W/AUTO DIFF WBC: CPT

## 2019-04-17 PROCEDURE — 36600 WITHDRAWAL OF ARTERIAL BLOOD: CPT

## 2019-04-17 PROCEDURE — 82962 GLUCOSE BLOOD TEST: CPT

## 2019-04-17 PROCEDURE — 74011636637 HC RX REV CODE- 636/637: Performed by: FAMILY MEDICINE

## 2019-04-17 PROCEDURE — 80048 BASIC METABOLIC PNL TOTAL CA: CPT

## 2019-04-17 PROCEDURE — 65270000029 HC RM PRIVATE

## 2019-04-17 PROCEDURE — 82803 BLOOD GASES ANY COMBINATION: CPT

## 2019-04-17 PROCEDURE — 77010033678 HC OXYGEN DAILY

## 2019-04-17 PROCEDURE — 74011250637 HC RX REV CODE- 250/637: Performed by: FAMILY MEDICINE

## 2019-04-17 PROCEDURE — 74011250636 HC RX REV CODE- 250/636: Performed by: ORTHOPAEDIC SURGERY

## 2019-04-17 PROCEDURE — 94760 N-INVAS EAR/PLS OXIMETRY 1: CPT

## 2019-04-17 PROCEDURE — 74011000258 HC RX REV CODE- 258: Performed by: ORTHOPAEDIC SURGERY

## 2019-04-17 PROCEDURE — 77030020255 HC SOL INJ LR 1000ML BG

## 2019-04-17 PROCEDURE — 71045 X-RAY EXAM CHEST 1 VIEW: CPT

## 2019-04-17 PROCEDURE — 74011636637 HC RX REV CODE- 636/637: Performed by: INTERNAL MEDICINE

## 2019-04-17 PROCEDURE — 36415 COLL VENOUS BLD VENIPUNCTURE: CPT

## 2019-04-17 PROCEDURE — 87040 BLOOD CULTURE FOR BACTERIA: CPT

## 2019-04-17 PROCEDURE — 74011250637 HC RX REV CODE- 250/637: Performed by: INTERNAL MEDICINE

## 2019-04-17 RX ORDER — POTASSIUM CHLORIDE 20 MEQ/1
40 TABLET, EXTENDED RELEASE ORAL ONCE
Status: COMPLETED | OUTPATIENT
Start: 2019-04-17 | End: 2019-04-17

## 2019-04-17 RX ORDER — SODIUM CHLORIDE 9 MG/ML
50 INJECTION, SOLUTION INTRAVENOUS CONTINUOUS
Status: DISCONTINUED | OUTPATIENT
Start: 2019-04-17 | End: 2019-04-19

## 2019-04-17 RX ORDER — INSULIN GLARGINE 100 [IU]/ML
16 INJECTION, SOLUTION SUBCUTANEOUS
Status: DISCONTINUED | OUTPATIENT
Start: 2019-04-17 | End: 2019-04-18

## 2019-04-17 RX ORDER — FUROSEMIDE 10 MG/ML
20 INJECTION INTRAMUSCULAR; INTRAVENOUS ONCE
Status: COMPLETED | OUTPATIENT
Start: 2019-04-17 | End: 2019-04-17

## 2019-04-17 RX ADMIN — FUROSEMIDE 20 MG: 10 INJECTION, SOLUTION INTRAMUSCULAR; INTRAVENOUS at 15:32

## 2019-04-17 RX ADMIN — Medication 10 ML: at 21:33

## 2019-04-17 RX ADMIN — SODIUM CHLORIDE, SODIUM LACTATE, POTASSIUM CHLORIDE, AND CALCIUM CHLORIDE 75 ML/HR: 600; 310; 30; 20 INJECTION, SOLUTION INTRAVENOUS at 04:23

## 2019-04-17 RX ADMIN — APIXABAN 5 MG: 5 TABLET, FILM COATED ORAL at 17:34

## 2019-04-17 RX ADMIN — INSULIN LISPRO 4 UNITS: 100 INJECTION, SOLUTION INTRAVENOUS; SUBCUTANEOUS at 22:00

## 2019-04-17 RX ADMIN — ACETAMINOPHEN 650 MG: 325 TABLET, FILM COATED ORAL at 15:35

## 2019-04-17 RX ADMIN — ACETAMINOPHEN 650 MG: 325 TABLET, FILM COATED ORAL at 21:32

## 2019-04-17 RX ADMIN — POTASSIUM CHLORIDE 40 MEQ: 20 TABLET, EXTENDED RELEASE ORAL at 15:35

## 2019-04-17 RX ADMIN — TRAZODONE HYDROCHLORIDE 100 MG: 50 TABLET ORAL at 21:33

## 2019-04-17 RX ADMIN — ALBUTEROL SULFATE 2.5 MG: 2.5 SOLUTION RESPIRATORY (INHALATION) at 14:05

## 2019-04-17 RX ADMIN — NALOXONE HYDROCHLORIDE 0.4 MG: 0.4 INJECTION, SOLUTION INTRAMUSCULAR; INTRAVENOUS; SUBCUTANEOUS at 13:01

## 2019-04-17 RX ADMIN — METOPROLOL TARTRATE 25 MG: 25 TABLET ORAL at 11:15

## 2019-04-17 RX ADMIN — Medication 5 ML: at 13:46

## 2019-04-17 RX ADMIN — CEFTRIAXONE SODIUM 1 G: 1 INJECTION, POWDER, FOR SOLUTION INTRAMUSCULAR; INTRAVENOUS at 17:30

## 2019-04-17 RX ADMIN — SODIUM CHLORIDE 1000 MG: 900 INJECTION, SOLUTION INTRAVENOUS at 04:23

## 2019-04-17 RX ADMIN — ALBUTEROL SULFATE 2.5 MG: 2.5 SOLUTION RESPIRATORY (INHALATION) at 19:45

## 2019-04-17 RX ADMIN — DONEPEZIL HYDROCHLORIDE 10 MG: 5 TABLET, FILM COATED ORAL at 21:33

## 2019-04-17 RX ADMIN — INSULIN LISPRO 4 UNITS: 100 INJECTION, SOLUTION INTRAVENOUS; SUBCUTANEOUS at 17:34

## 2019-04-17 RX ADMIN — Medication 5 ML: at 04:25

## 2019-04-17 RX ADMIN — INSULIN GLARGINE 16 UNITS: 100 INJECTION, SOLUTION SUBCUTANEOUS at 17:33

## 2019-04-17 RX ADMIN — SODIUM CHLORIDE 100 ML/HR: 900 INJECTION, SOLUTION INTRAVENOUS at 17:30

## 2019-04-17 RX ADMIN — ALBUTEROL SULFATE 2.5 MG: 2.5 SOLUTION RESPIRATORY (INHALATION) at 08:41

## 2019-04-17 RX ADMIN — VENLAFAXINE HYDROCHLORIDE 150 MG: 75 CAPSULE, EXTENDED RELEASE ORAL at 08:29

## 2019-04-17 NOTE — PROGRESS NOTES
PCT notified this RN of temp of 101.5. This RN notified hospitalist of increased temperature, continual drowsiness, and low alertness. Sepsis workup initiated and new orders received for blood cultures, UA/culture, and initiate antibx. Will continue to monitor.

## 2019-04-17 NOTE — PROGRESS NOTES
Patient and family have provided their SNF choices for STR referrals: [facility], and [facility]. CM is unable to send referrals to STR options until PT/OT evaluations have been completed. CM will continue to follow case to assist with discharge planning needs.

## 2019-04-17 NOTE — PROGRESS NOTES
Physical Therapy Note: Attempted to see patient this AM for mobility assessment. Patient presents supine in bed, drowsy, left lateral lean and cervical rotation preference. Initially only responsive to strong sternal rub and unable to follow commands. Vitals assessed: BP and HR elevated, HR appeared irregular, and Sp02 low. Primary RN and Charge RN notified and to bedside. With additional stimulation patient able to state name and oriented to place; remains drowsy. RN attending at bedside. Patient re-positioned in upright and in midline. Will hold mobility assessment secondary to status. No charge to patient. Will follow. Thank you, Amari Kraus, PT, DPT 
4/17/19 11:17AM

## 2019-04-17 NOTE — PROGRESS NOTES
Noticed pt having difficulty staying awake and command following. Pt maintaining 87/88% on 2L NC, increased to 3L sattubg 90/91%. Notified hospitalist. New orders received for ABG and CXR. Will continue to monitor.

## 2019-04-17 NOTE — PROGRESS NOTES
Interdisciplinary team rounds were held 4/17/2019 with the following team members:Care Management, Physical Therapy, Physician and  and the patient. Anticipate discharge to [facility] pending bed availability. Plan of care discussed. See clinical pathway and/or care plan for interventions and desired outcomes.

## 2019-04-17 NOTE — PROGRESS NOTES
OT orders received, chart reviewed, evaluation attempted. Patient supine in bed upon arrival, no family present. Very drowsy with notable lean to L side. Disoriented to place. Unable to follow commands to squeeze hands. Vital signs obtained and primary RN & charge RN called to bedside to address. Will HOLD OT at this time and check back as schedule permits. Vital Signs:  
BP: 169/110 O2: 91% on 3L O2 via nasal cannula HR: 90s-120s resting Magazine RAJEEV Barkley, OTR/L 
25 minutes spent NO CHARGE

## 2019-04-17 NOTE — PROGRESS NOTES
Problem: Diabetes Self-Management Goal: *Disease process and treatment process Description Define diabetes and identify own type of diabetes; list 3 options for treating diabetes. Outcome: Progressing Towards Goal 
Goal: *Incorporating nutritional management into lifestyle Description Describe effect of type, amount and timing of food on blood glucose; list 3 methods for planning meals. Outcome: Progressing Towards Goal 
Goal: *Incorporating physical activity into lifestyle Description State effect of exercise on blood glucose levels. Outcome: Progressing Towards Goal 
Goal: *Developing strategies to promote health/change behavior Description Define the ABC's of diabetes; identify appropriate screenings, schedule and personal plan for screenings. Outcome: Progressing Towards Goal 
Goal: *Using medications safely Description State effect of diabetes medications on diabetes; name diabetes medication taking, action and side effects. Outcome: Progressing Towards Goal 
Goal: *Monitoring blood glucose, interpreting and using results Description Identify recommended blood glucose targets  and personal targets. Outcome: Progressing Towards Goal 
Goal: *Prevention, detection, treatment of acute complications Description List symptoms of hyper- and hypoglycemia; describe how to treat low blood sugar and actions for lowering  high blood glucose level. Outcome: Progressing Towards Goal 
Goal: *Prevention, detection and treatment of chronic complications Description Define the natural course of diabetes and describe the relationship of blood glucose levels to long term complications of diabetes. Outcome: Progressing Towards Goal 
Goal: *Developing strategies to address psychosocial issues Description Describe feelings about living with diabetes; identify support needed and support network Outcome: Progressing Towards Goal 
  
Problem: Pressure Injury - Risk of 
Goal: *Prevention of pressure injury Description Document Jatinder Scale and appropriate interventions in the flowsheet. Outcome: Progressing Towards Goal 
  
Problem: Patient Education: Go to Patient Education Activity Goal: Patient/Family Education Outcome: Progressing Towards Goal 
  
Problem: Falls - Risk of 
Goal: *Absence of Falls Description Document Durenda Spenser Fall Risk and appropriate interventions in the flowsheet. Outcome: Progressing Towards Goal 
  
Problem: Patient Education: Go to Patient Education Activity Goal: Patient/Family Education Outcome: Progressing Towards Goal 
  
Problem: Patient Education: Go to Patient Education Activity Goal: Patient/Family Education Outcome: Progressing Towards Goal 
  
Problem: Hypertension Goal: *Blood pressure within specified parameters Outcome: Progressing Towards Goal 
Goal: *Fluid volume balance Outcome: Progressing Towards Goal 
Goal: *Labs within defined limits Outcome: Progressing Towards Goal 
  
Problem: Patient Education: Go to Patient Education Activity Goal: Patient/Family Education Outcome: Progressing Towards Goal 
  
Problem: Breathing Pattern - Ineffective Goal: *Absence of hypoxia Outcome: Progressing Towards Goal 
Goal: *Use of effective breathing techniques Outcome: Progressing Towards Goal

## 2019-04-17 NOTE — PROGRESS NOTES
Hospitalist Progress Note Admit Date:  2019  2:51 AM  
Name:  Demetris Uribe Age:  76 y.o. 
:  1945 MRN:  [id number] PCP:  Yoselyn Paul MD 
Treatment Team: Attending Provider: Felicita Menjivar DO; Consulting Provider: Claire Sevilla MD; Consulting Provider: Moody Ramírez DO; Utilization Review: Bjorn Richardson RN; Consulting Provider: Suzy Kyle MD; Care Manager: Cayla Ribeiro; Occupational Therapist: Gris Joseph, OTR/L; Physical Therapist: Christopher Merlos DPT; Consulting Provider: Anthony Benavides MD 
 
Subjective:  
 
From previous notes: \"67 y.o. Female with PMH Afib on Eliquis, DM II and congenital kidney d(with 1 kidney) was admitted for left ankle fracture after she sustained a fall when she off her bed. Pt is laying in bed with left ankle splint on. Reports she is hurting at her right ribs where she hit it at a night stand when she fell. Right rib pain is worse only when she takes a deep breath. Has some cough, but denies shortness of breath or chest pain. \" 
  
 - post op last pm returned from o.r. Still sedate this am.  
Responded to narcan. More alert. abg with hypoxemia and cxr with mild congestion. Will give single dose lasix iv. Continue to hold [facility].  
  
Review of systems negative except stated above. Objective:  
 
Patient Vitals for the past 24 hrs: 
 Temp Pulse Resp BP SpO2  
19 1427     94 % 19 1258     92 % 19 1148 99.8 °F (37.7 °C) 99 18 131/66 90 % 19 1100  (!) 110  (!) 169/110 91 % 19 0841     96 % 19 0735 100 °F (37.8 °C) (!) 107 17 142/80 96 % 19 0334 99.7 °F (37.6 °C) 100 18 146/78 92 % 19 2331 99.5 °F (37.5 °C) 90 18 131/78 93 % 19     96 % 19 99.7 °F (37.6 °C) 76 18 144/82 (!) 74 % 19     94 % 19 1555 98.6 °F (37 °C) (!) 106 18 135/79 93 % 04/16/19 1524  (!) 108  138/86 90 % 04/16/19 1511  89  148/82 (!) 89 % 04/16/19 1456  (!) 103  141/73 93 % Oxygen Therapy O2 Sat (%): 94 % (04/17/19 1427) Pulse via Oximetry: 103 beats per minute (04/17/19 1427) O2 Device: Heated; Hi flow nasal cannula (04/17/19 1427) O2 Flow Rate (L/min): 50 l/min (04/17/19 1427) O2 Temperature: 95 °F (35 °C) (04/17/19 1427) FIO2 (%): 40 %(30 increased to 40) (04/17/19 1427) Intake/Output Summary (Last 24 hours) at 4/17/2019 1453 Last data filed at 4/17/2019 8506 Gross per 24 hour Intake 0 ml Output  Net 0 ml REVIEW OF SYSTEMS: Comprehensive ROS performed and negative except as stated in HPI. Physical Examination: 
Physical Exam  
Constitutional: No distress. sedate HENT:  
Head: Normocephalic and atraumatic. Eyes: Pupils are equal, round, and reactive to light. Conjunctivae are normal.  
Neck: Normal range of motion. Neck supple. Cardiovascular: Normal rate. Exam reveals no gallop. afib Pulmonary/Chest: Effort normal and breath sounds normal.  
Abdominal: Soft. She exhibits no distension. There is no tenderness. Musculoskeletal: She exhibits no tenderness or deformity. Neurological: She is alert. GCS score is 15. Skin: No rash noted. She is not diaphoretic. No pallor. Data Review: 
I have reviewed all labs, meds, telemetry events, and studies from the last 24 hours. Recent Results (from the past 24 hour(s)) PLEASE READ & DOCUMENT PPD TEST IN 48 HRS Collection Time: 04/16/19  3:28 PM  
Result Value Ref Range PPD Negative Negative  
 mm Induration 0 0 - 5 mm GLUCOSE, POC Collection Time: 04/16/19  3:38 PM  
Result Value Ref Range Glucose (POC) 193 (H) 65 - 100 mg/dL GLUCOSE, POC Collection Time: 04/16/19  9:00 PM  
Result Value Ref Range Glucose (POC) 244 (H) 65 - 100 mg/dL METABOLIC PANEL, BASIC Collection Time: 04/17/19  5:19 AM  
Result Value Ref Range Sodium 138 136 - 145 mmol/L Potassium 4.5 3.5 - 5.1 mmol/L Chloride 101 98 - 107 mmol/L  
 CO2 30 21 - 32 mmol/L Anion gap 7 7 - 16 mmol/L Glucose 203 (H) 65 - 100 mg/dL BUN 15 8 - 23 MG/DL Creatinine 0.99 0.6 - 1.0 MG/DL  
 GFR est AA >60 >60 ml/min/1.73m2 GFR est non-AA 58 (L) >60 ml/min/1.73m2 Calcium 9.3 8.3 - 10.4 MG/DL  
CBC WITH AUTOMATED DIFF Collection Time: 04/17/19  5:19 AM  
Result Value Ref Range WBC 9.5 4.3 - 11.1 K/uL  
 RBC 4.40 4.05 - 5.2 M/uL  
 HGB 12.2 11.7 - 15.4 g/dL HCT 42.4 35.8 - 46.3 % MCV 96.4 79.6 - 97.8 FL  
 MCH 27.7 26.1 - 32.9 PG  
 MCHC 28.8 (L) 31.4 - 35.0 g/dL  
 RDW 14.0 11.9 - 14.6 % PLATELET 278 492 - 542 K/uL MPV 10.9 9.4 - 12.3 FL ABSOLUTE NRBC 0.00 0.0 - 0.2 K/uL  
 DF AUTOMATED NEUTROPHILS 80 (H) 43 - 78 % LYMPHOCYTES 9 (L) 13 - 44 % MONOCYTES 11 4.0 - 12.0 % EOSINOPHILS 0 (L) 0.5 - 7.8 % BASOPHILS 0 0.0 - 2.0 % IMMATURE GRANULOCYTES 1 0.0 - 5.0 %  
 ABS. NEUTROPHILS 7.6 1.7 - 8.2 K/UL  
 ABS. LYMPHOCYTES 0.9 0.5 - 4.6 K/UL  
 ABS. MONOCYTES 1.0 0.1 - 1.3 K/UL  
 ABS. EOSINOPHILS 0.0 0.0 - 0.8 K/UL  
 ABS. BASOPHILS 0.0 0.0 - 0.2 K/UL  
 ABS. IMM. GRANS. 0.1 0.0 - 0.5 K/UL GLUCOSE, POC Collection Time: 04/17/19  7:31 AM  
Result Value Ref Range Glucose (POC) 129 (H) 65 - 100 mg/dL POC G3 Collection Time: 04/17/19 11:38 AM  
Result Value Ref Range Device: ROOM AIR    
 FIO2 (POC) 0.21 % pH (POC) 7.352 7.35 - 7.45    
 pCO2 (POC) 56.8 (H) 35 - 45 MMHG  
 pO2 (POC) 43 (LL) 75 - 100 MMHG  
 HCO3 (POC) 31.5 (H) 22 - 26 MMOL/L  
 sO2 (POC) 74 (L) 95 - 98 % Base excess (POC) 4 mmol/L Allens test (POC) YES Site LEFT RADIAL Patient temp. 98.6 Specimen type (POC) ARTERIAL Performed by Estela   
 CO2, POC 33 MMOL/L Critical value read back 00:01 Respiratory comment: PhysicianNotified COLLECT TIME 1,135 GLUCOSE, POC  Collection Time: 04/17/19 11:43 AM  
 Result Value Ref Range Glucose (POC) 190 (H) 65 - 100 mg/dL All Micro Results None Current Meds: 
Current Facility-Administered Medications Medication Dose Route Frequency  furosemide (LASIX) injection 20 mg  20 mg IntraVENous ONCE  potassium chloride (K-DUR, KLOR-CON) SR tablet 40 mEq  40 mEq Oral ONCE  
 lactated Ringers infusion  75 mL/hr IntraVENous CONTINUOUS  
 sodium chloride (NS) flush 5-40 mL  5-40 mL IntraVENous Q8H  
 sodium chloride (NS) flush 5-40 mL  5-40 mL IntraVENous PRN  
 acetaminophen (TYLENOL) tablet 650 mg  650 mg Oral Q8H  
 oxyCODONE IR (ROXICODONE) tablet 5-10 mg  5-10 mg Oral Q4H PRN  
 ondansetron (ZOFRAN) injection 4 mg  4 mg IntraVENous Q4H PRN  
 alum-mag hydroxide-simeth (MYLANTA) oral suspension 30 mL  30 mL Oral Q4H PRN  
 naloxone (NARCAN) injection 0.4 mg  0.4 mg IntraVENous PRN  
 clonazePAM (KlonoPIN) tablet 1 mg  1 mg Oral QHS  metoprolol tartrate (LOPRESSOR) tablet 25 mg  25 mg Oral Q6H PRN  
 albuterol (PROVENTIL VENTOLIN) nebulizer solution 2.5 mg  2.5 mg Nebulization TID RT  
 albuterol (PROVENTIL VENTOLIN) nebulizer solution 2.5 mg  2.5 mg Nebulization Q4H PRN  
 apixaban (ELIQUIS) tablet 5 mg  5 mg Oral BID  venlafaxine-SR (EFFEXOR-XR) capsule 150 mg  150 mg Oral DAILY  donepezil (ARICEPT) tablet 10 mg  10 mg Oral QHS  insulin glargine (LANTUS) injection 10 Units  10 Units SubCUTAneous ACD  diphenoxylate-atropine (LOMOTIL) tablet 2 Tab  2 Tab Oral QID PRN  
 traZODone (DESYREL) tablet 100 mg  100 mg Oral QHS  acetaminophen (TYLENOL) tablet 650 mg  650 mg Oral Q4H PRN  
 insulin lispro (HUMALOG) injection   SubCUTAneous AC&HS Diet: DIET DIABETIC CONSISTENT CARB Other Studies (last 24 hours): Xr Chest Sngl V Result Date: 4/17/2019 Chest X-ray INDICATION: 75-year-old female with hypoxia Comparison exams: 4/14/2019 A portable AP view of the chest was obtained.  FINDINGS: AP upright portable view the chest. Trachea midline. Cardiac enlargement stable. Stable appearance to dual-chamber pacer wires entering from the left. Central pulmonary vascular congestion. No evidence of consolidation pleural effusion or pneumothorax is seen. The bony thorax is intact. IMPRESSION: Mild degree of central vessel congestion Assessment and Plan:  
 
Hospital Problems as of 4/17/2019 Date Reviewed: 4/16/2019 Codes Class Noted - Resolved POA * (Principal) Ankle fracture ICD-10-CM: A65.228K 
ICD-9-CM: 824.8  4/14/2019 - Present Yes Fall ICD-10-CM: W19. Liam Palomo ICD-9-CM: E888.9  4/14/2019 - Present Yes Hypoxia ICD-10-CM: R09.02 
ICD-9-CM: 799.02  10/2/2017 - Present Yes Overview Signed 10/2/2017  2:28 AM by Jazmin Lucero MD  
  Probable COPD + obesity Morbid obesity ([facility] (Chronic) ICD-10-CM: E66.01 
ICD-9-CM: 278.01  10/2/2017 - Present Yes Paroxysmal atrial fibrillation (HCC) (Chronic) ICD-10-CM: I48.0 ICD-9-CM: 427.31  6/6/2017 - Present Yes Pacemaker ICD-10-CM: Z95.0 ICD-9-CM: V45.01  1/13/2017 - Present Yes Overview Signed 1/13/2017  6:14 PM by Beth Godwin  
  1. Biotronik MRI Pacemaker - Jan 2017 Atrial flutter ([facility] [address] (Chronic) ICD-10-CM: W63.54 
ICD-9-CM: 427.32  10/21/2016 - Present Overview Signed 12/22/2016  9:25 AM by Beth Godwin 1. Atrial Flutter - Dec 2015 - Ablation of right-sided isthmus-dependent atypical clock-wise atrial flutter. 2. ARACELY Showed RENO Velocity of 25cm/sec Essential hypertension (Chronic) ICD-10-CM: I10 
ICD-9-CM: 401.9  10/21/2016 - Present Yes Chronic bronchitis ([address] (Chronic) ICD-10-CM: Q49 ICD-9-CM: 491.9  4/17/2013 - Present Yes Anxiety ICD-10-CM: F41.9 ICD-9-CM: 300.00  4/17/2013 - Present Yes GERD (gastroesophageal reflux disease) (Chronic) ICD-10-CM: K21.9 ICD-9-CM: 530.81  4/17/2013 - Present Yes Overview Signed 4/17/2013  4:27 PM by Norm Ghosh LPN  
  chronic DM type 2 (diabetes mellitus, type 2) (HCC) (Chronic) ICD-10-CM: E11.9 ICD-9-CM: 250.00  4/17/2013 - Present Yes A/P:   
 Ankle fracture (4/14/2019) 
- s/p ORIF 4/16 
- PT/OT 
- Pain control but decrease analgesia as oversedate 
  
 COPD/chronic bronchitis 
- continue Albuterol prn wheezing 
- Continue to monitor--increased oxygenation needs noted 
  
  Fall (4/14/2019) - With ankle fracture--s/p orif POD #1 
  
  DM type 2 (diabetes mellitus, type 2) ([facility] [address] (4/17/2013) - Continue Lantus 10U--titrate - Continue Humalog SSI 
  
  Essential hypertension (10/21/2016)   
  Paroxysmal atrial fibrillation ([address] (6/6/2017) 
- restart Eliquis 
- metoprolol prn 
  
  
  Anxiety (4/17/2013) - Artur Hasten 
  
  Pacemaker (1/13/2017)   
  Morbid obesity ([facility] [address] (10/2/2017)   
  
DIET DIABETIC CONSISTENT CARB Regular 
  
DVT Prophylaxis: SCDs (restart Eliquis when ok with Ortho)

## 2019-04-17 NOTE — OP NOTES
[address] [place] [address]  OPERATIVE REPORT    Name:  Syed Rosario  MR#:  [id number]  :  1945  ACCOUNT #:  [de-identified]  DATE OF SERVICE:  2019    PREOPERATIVE DIAGNOSIS:  Left bimalleolar equivalent ankle fracture with syndesmotic disruption. POSTOPERATIVE DIAGNOSIS:  Left bimalleolar equivalent ankle fracture with syndesmotic disruption. PROCEDURE PERFORMED:  Open reduction and internal fixation, left lateral malleolus with syndesmotic repair. SURGEON:  Selwyn Gonzalez MD    ASSISTANT:  None. ANESTHESIA:  General    COMPLICATIONS:  None. SPECIMENS REMOVED:  None    IMPLANTS:  1. Synthes lateral malleolus plate and screw construct. 2.  Arthrex syndesmotic TightRope. ESTIMATED BLOOD LOSS:  Minimal.    PROCEDURE IN DETAIL:  After discussion of risks, benefits, and alternatives, the patient expressed understanding and strongly desired to proceed. She was brought to the operating room. General anesthesia was administered. She had also received regional anesthesia. Left lower extremity was prepped and draped in normal sterile fashion. Time-out was then performed. A lateral approach to the lateral malleolus was performed. Dissection was carried out down to the level of the fracture. Periosteum was elevated on either side of the fracture in order to adequately visualize the fracture. Direct manipulation of the fracture fragments was used to accomplish an anatomic interdigitation after evacuation of hematoma. A 2.7-mm interfragmentary screw was placed using AO technique from anterior to posterior. A Synthes lateral malleolus plate was then selected and initially affixed to the distal fragment using locked fixation. It was then affixed to the proximal fragment using a non-locking screw and subsequently locked screws.   The syndesmosis appeared to be somewhat wide and live stress view under fluoro demonstrated that there was a deltoid ligament and syndesmotic disruption. Accordingly, an Arthrex TightRope device was placed through the plate. It stabilized the syndesmosis well. There was no medial clear space widening or syndesmotic disruption. Special care was taken to make sure that the syndesmosis was appropriately reduced. The wound was copiously irrigated and closed in layered fashion. Sterile dressings were applied. The patient was placed in stirrups splint. She tolerated the procedure well. There were no complications. The specimen was sent to Pathology. POSTOPERATIVE PLAN:  Nonweightbearing, left lower extremity. Standard postoperative antibiotics and DVT prophylaxis. Medical management by the hospitalist.  The patient is ready for discharge to an appropriate facility when it is available. Yang Souza MD      [id number]  D:  04/16/2019 12:40  T:  04/16/2019 12:43  JOB #:  [id number]  CC:   Leslie Bertrand MD

## 2019-04-17 NOTE — PROGRESS NOTES
Problem: Diabetes Self-Management Goal: *Disease process and treatment process Description Define diabetes and identify own type of diabetes; list 3 options for treating diabetes. Outcome: Progressing Towards Goal 
Goal: *Incorporating nutritional management into lifestyle Description Describe effect of type, amount and timing of food on blood glucose; list 3 methods for planning meals. Outcome: Progressing Towards Goal 
Goal: *Incorporating physical activity into lifestyle Description State effect of exercise on blood glucose levels. Outcome: Progressing Towards Goal 
Goal: *Developing strategies to promote health/change behavior Description Define the ABC's of diabetes; identify appropriate screenings, schedule and personal plan for screenings. Outcome: Progressing Towards Goal 
Goal: *Using medications safely Description State effect of diabetes medications on diabetes; name diabetes medication taking, action and side effects. Outcome: Progressing Towards Goal 
Goal: *Monitoring blood glucose, interpreting and using results Description Identify recommended blood glucose targets  and personal targets. Outcome: Progressing Towards Goal 
Goal: *Prevention, detection, treatment of acute complications Description List symptoms of hyper- and hypoglycemia; describe how to treat low blood sugar and actions for lowering  high blood glucose level. Outcome: Progressing Towards Goal 
Goal: *Prevention, detection and treatment of chronic complications Description Define the natural course of diabetes and describe the relationship of blood glucose levels to long term complications of diabetes. Outcome: Progressing Towards Goal 
Goal: *Developing strategies to address psychosocial issues Description Describe feelings about living with diabetes; identify support needed and support network Outcome: Progressing Towards Goal 
  
Problem: Pressure Injury - Risk of 
Goal: *Prevention of pressure injury Description Document Jatinder Scale and appropriate interventions in the flowsheet. Outcome: Progressing Towards Goal 
  
Problem: Falls - Risk of 
Goal: *Absence of Falls Description Document Yolanda Mcdaniel Fall Risk and appropriate interventions in the flowsheet. Outcome: Progressing Towards Goal 
  
Problem: Interdisciplinary Rounds Goal: Interdisciplinary Rounds Outcome: Progressing Towards Goal 
  
Problem: Patient Education: Go to Patient Education Activity Goal: Patient/Family Education Outcome: Progressing Towards Goal 
  
Problem: Hypertension Goal: *Blood pressure within specified parameters Outcome: Progressing Towards Goal 
Goal: *Fluid volume balance Outcome: Progressing Towards Goal 
Goal: *Labs within defined limits Outcome: Progressing Towards Goal 
  
Problem: Tissue Perfusion - Cardiopulmonary, Altered Goal: *Optimize tissue perfusion Outcome: Progressing Towards Goal 
Goal: *Absence of hypoxia Outcome: Progressing Towards Goal 
  
Problem: Afib: Discharge Outcomes (not in CAT) Goal: *Hemodynamically stable Outcome: Progressing Towards Goal 
Goal: *Optimal pain control at patient's stated goal 
Outcome: Progressing Towards Goal 
Goal: *Verbalizes understanding and describes prescribed diet Outcome: Progressing Towards Goal 
  
Problem: Breathing Pattern - Ineffective Goal: *Absence of hypoxia Outcome: Progressing Towards Goal 
Goal: *Use of effective breathing techniques Outcome: Progressing Towards Goal

## 2019-04-18 PROBLEM — J44.9 COPD (CHRONIC OBSTRUCTIVE PULMONARY DISEASE) (HCC): Status: ACTIVE | Noted: 2019-04-18

## 2019-04-18 PROBLEM — J96.01 ACUTE HYPOXEMIC RESPIRATORY FAILURE (HCC): Status: ACTIVE | Noted: 2019-04-18

## 2019-04-18 PROBLEM — J96.12 CHRONIC HYPERCAPNIC RESPIRATORY FAILURE (HCC): Status: ACTIVE | Noted: 2019-04-18

## 2019-04-18 LAB
ANION GAP SERPL CALC-SCNC: 6 MMOL/L (ref 7–16)
BASOPHILS # BLD: 0.1 K/UL (ref 0–0.2)
BASOPHILS NFR BLD: 1 % (ref 0–2)
BUN SERPL-MCNC: 19 MG/DL (ref 8–23)
CALCIUM SERPL-MCNC: 9.1 MG/DL (ref 8.3–10.4)
CHLORIDE SERPL-SCNC: 100 MMOL/L (ref 98–107)
CO2 SERPL-SCNC: 31 MMOL/L (ref 21–32)
CREAT SERPL-MCNC: 0.84 MG/DL (ref 0.6–1)
DIFFERENTIAL METHOD BLD: ABNORMAL
EOSINOPHIL # BLD: 0.1 K/UL (ref 0–0.8)
EOSINOPHIL NFR BLD: 1 % (ref 0.5–7.8)
ERYTHROCYTE [DISTWIDTH] IN BLOOD BY AUTOMATED COUNT: 14 % (ref 11.9–14.6)
GLUCOSE BLD STRIP.AUTO-MCNC: 161 MG/DL (ref 65–100)
GLUCOSE BLD STRIP.AUTO-MCNC: 195 MG/DL (ref 65–100)
GLUCOSE BLD STRIP.AUTO-MCNC: 260 MG/DL (ref 65–100)
GLUCOSE BLD STRIP.AUTO-MCNC: 291 MG/DL (ref 65–100)
GLUCOSE SERPL-MCNC: 172 MG/DL (ref 65–100)
HCT VFR BLD AUTO: 38.5 % (ref 35.8–46.3)
HGB BLD-MCNC: 11.1 G/DL (ref 11.7–15.4)
IMM GRANULOCYTES # BLD AUTO: 0 K/UL (ref 0–0.5)
IMM GRANULOCYTES NFR BLD AUTO: 0 % (ref 0–5)
LYMPHOCYTES # BLD: 1.6 K/UL (ref 0.5–4.6)
LYMPHOCYTES NFR BLD: 19 % (ref 13–44)
MCH RBC QN AUTO: 27.4 PG (ref 26.1–32.9)
MCHC RBC AUTO-ENTMCNC: 28.8 G/DL (ref 31.4–35)
MCV RBC AUTO: 95.1 FL (ref 79.6–97.8)
MONOCYTES # BLD: 0.9 K/UL (ref 0.1–1.3)
MONOCYTES NFR BLD: 11 % (ref 4–12)
NEUTS SEG # BLD: 5.6 K/UL (ref 1.7–8.2)
NEUTS SEG NFR BLD: 68 % (ref 43–78)
NRBC # BLD: 0 K/UL (ref 0–0.2)
PLATELET # BLD AUTO: 203 K/UL (ref 150–450)
PMV BLD AUTO: 11.2 FL (ref 9.4–12.3)
POTASSIUM SERPL-SCNC: 4 MMOL/L (ref 3.5–5.1)
RBC # BLD AUTO: 4.05 M/UL (ref 4.05–5.2)
SODIUM SERPL-SCNC: 137 MMOL/L (ref 136–145)
WBC # BLD AUTO: 8.3 K/UL (ref 4.3–11.1)

## 2019-04-18 PROCEDURE — 74011636637 HC RX REV CODE- 636/637: Performed by: INTERNAL MEDICINE

## 2019-04-18 PROCEDURE — 82962 GLUCOSE BLOOD TEST: CPT

## 2019-04-18 PROCEDURE — 97161 PT EVAL LOW COMPLEX 20 MIN: CPT

## 2019-04-18 PROCEDURE — 77010033711 HC HIGH FLOW OXYGEN

## 2019-04-18 PROCEDURE — 97165 OT EVAL LOW COMPLEX 30 MIN: CPT

## 2019-04-18 PROCEDURE — 74011000250 HC RX REV CODE- 250: Performed by: INTERNAL MEDICINE

## 2019-04-18 PROCEDURE — 36415 COLL VENOUS BLD VENIPUNCTURE: CPT

## 2019-04-18 PROCEDURE — 74011250637 HC RX REV CODE- 250/637: Performed by: FAMILY MEDICINE

## 2019-04-18 PROCEDURE — 74011636637 HC RX REV CODE- 636/637: Performed by: FAMILY MEDICINE

## 2019-04-18 PROCEDURE — 77030021668 HC NEB PREFIL KT VYRM -A

## 2019-04-18 PROCEDURE — 97530 THERAPEUTIC ACTIVITIES: CPT

## 2019-04-18 PROCEDURE — 77030020263 HC SOL INJ SOD CL0.9% LFCR 1000ML

## 2019-04-18 PROCEDURE — 94640 AIRWAY INHALATION TREATMENT: CPT

## 2019-04-18 PROCEDURE — 74011250637 HC RX REV CODE- 250/637: Performed by: INTERNAL MEDICINE

## 2019-04-18 PROCEDURE — 94760 N-INVAS EAR/PLS OXIMETRY 1: CPT

## 2019-04-18 PROCEDURE — 80048 BASIC METABOLIC PNL TOTAL CA: CPT

## 2019-04-18 PROCEDURE — 74011000258 HC RX REV CODE- 258: Performed by: INTERNAL MEDICINE

## 2019-04-18 PROCEDURE — 74011250637 HC RX REV CODE- 250/637: Performed by: ORTHOPAEDIC SURGERY

## 2019-04-18 PROCEDURE — 65270000029 HC RM PRIVATE

## 2019-04-18 PROCEDURE — 85025 COMPLETE CBC W/AUTO DIFF WBC: CPT

## 2019-04-18 PROCEDURE — 74011250636 HC RX REV CODE- 250/636: Performed by: INTERNAL MEDICINE

## 2019-04-18 RX ORDER — POTASSIUM CHLORIDE 20 MEQ/1
40 TABLET, EXTENDED RELEASE ORAL ONCE
Status: COMPLETED | OUTPATIENT
Start: 2019-04-18 | End: 2019-04-18

## 2019-04-18 RX ORDER — FUROSEMIDE 10 MG/ML
40 INJECTION INTRAMUSCULAR; INTRAVENOUS ONCE
Status: COMPLETED | OUTPATIENT
Start: 2019-04-18 | End: 2019-04-18

## 2019-04-18 RX ORDER — INSULIN GLARGINE 100 [IU]/ML
20 INJECTION, SOLUTION SUBCUTANEOUS
Status: DISCONTINUED | OUTPATIENT
Start: 2019-04-18 | End: 2019-04-19

## 2019-04-18 RX ADMIN — INSULIN LISPRO 6 UNITS: 100 INJECTION, SOLUTION INTRAVENOUS; SUBCUTANEOUS at 11:23

## 2019-04-18 RX ADMIN — ACETAMINOPHEN 650 MG: 325 TABLET, FILM COATED ORAL at 05:48

## 2019-04-18 RX ADMIN — ALBUTEROL SULFATE 2.5 MG: 2.5 SOLUTION RESPIRATORY (INHALATION) at 07:31

## 2019-04-18 RX ADMIN — ACETAMINOPHEN 650 MG: 325 TABLET, FILM COATED ORAL at 21:04

## 2019-04-18 RX ADMIN — Medication 10 ML: at 21:40

## 2019-04-18 RX ADMIN — APIXABAN 5 MG: 5 TABLET, FILM COATED ORAL at 08:00

## 2019-04-18 RX ADMIN — VENLAFAXINE HYDROCHLORIDE 150 MG: 75 CAPSULE, EXTENDED RELEASE ORAL at 08:00

## 2019-04-18 RX ADMIN — INSULIN LISPRO 6 UNITS: 100 INJECTION, SOLUTION INTRAVENOUS; SUBCUTANEOUS at 21:41

## 2019-04-18 RX ADMIN — INSULIN LISPRO 2 UNITS: 100 INJECTION, SOLUTION INTRAVENOUS; SUBCUTANEOUS at 16:30

## 2019-04-18 RX ADMIN — CEFTRIAXONE SODIUM 1 G: 1 INJECTION, POWDER, FOR SOLUTION INTRAMUSCULAR; INTRAVENOUS at 17:37

## 2019-04-18 RX ADMIN — TRAZODONE HYDROCHLORIDE 100 MG: 50 TABLET ORAL at 21:04

## 2019-04-18 RX ADMIN — POTASSIUM CHLORIDE 40 MEQ: 20 TABLET, EXTENDED RELEASE ORAL at 16:33

## 2019-04-18 RX ADMIN — DONEPEZIL HYDROCHLORIDE 10 MG: 5 TABLET, FILM COATED ORAL at 21:04

## 2019-04-18 RX ADMIN — ALBUTEROL SULFATE 2.5 MG: 2.5 SOLUTION RESPIRATORY (INHALATION) at 14:48

## 2019-04-18 RX ADMIN — ACETAMINOPHEN 650 MG: 325 TABLET, FILM COATED ORAL at 15:23

## 2019-04-18 RX ADMIN — INSULIN LISPRO 2 UNITS: 100 INJECTION, SOLUTION INTRAVENOUS; SUBCUTANEOUS at 07:58

## 2019-04-18 RX ADMIN — APIXABAN 5 MG: 5 TABLET, FILM COATED ORAL at 17:37

## 2019-04-18 RX ADMIN — ALBUTEROL SULFATE 2.5 MG: 2.5 SOLUTION RESPIRATORY (INHALATION) at 19:38

## 2019-04-18 RX ADMIN — INSULIN GLARGINE 20 UNITS: 100 INJECTION, SOLUTION SUBCUTANEOUS at 16:30

## 2019-04-18 RX ADMIN — Medication 5 ML: at 14:00

## 2019-04-18 RX ADMIN — FUROSEMIDE 40 MG: 10 INJECTION, SOLUTION INTRAMUSCULAR; INTRAVENOUS at 16:32

## 2019-04-18 RX ADMIN — Medication 10 ML: at 05:48

## 2019-04-18 NOTE — CDMP QUERY
Patient admitted with ankle fracture w/ORIF and hypoxia. Patient noted to have ABG with pCO2 57 and pO2 43 on room air after ORIF. If possible, please document in the progress notes and d/c summary if you are evaluating and / or treating any of the following: ? Acute respiratory failure 
o With hypoxia 
o With hypercapnia ? Chronic respiratory failure 
o With hypoxia 
o With hypercapnia ? Acute on chronic respiratory failure 
o With hypoxia 
o With hypercapnia ? Respiratory failure following surgery due to a chronic underlying condition (i.e. COPD) ? Acute Pulmonary Insufficiency following surgery ? Other, please specify ? Clinically unable to determine The medical record reflects the following: 
   Risk Factors: s/p ORIF, Possible COPD Clinical Indicators: ABG with pCO2 57 and pO2 43 on room air after ORIF, decreased alertness/difficulty waking and following commands per RN, CXR w/mild congestion, O2 87% on 2L O2 Treatment: IV lasix 20 mg x 1, Hold sedating meds, Narcan, Hi flow O2 Alva, Wali Márquez RN, BSN, CDS Clinical Documentation Improvement 
[phone]

## 2019-04-18 NOTE — PROGRESS NOTES
Problem: Mobility Impaired (Adult and Pediatric) Goal: *Acute Goals and Plan of Care (Insert Text) Description STG: 
(1.)Ms. Mariama Valdovinos will move from supine to sit and sit to supine , scoot up and down and roll side to side with MINIMAL ASSIST within 3 treatment day(s). (2.)Ms. Mariama Valdovinos will transfer from bed to chair and chair to bed with MODERATE ASSIST using the least restrictive device within 3 treatment day(s). (3.)Ms. Mariama Valdovinos will ambulate with MODERATE ASSIST for 2 feet with the least restrictive device within 3 treatment day(s). (4.)Ms. Mariama Valdovinos will perform standing static and dynamic balance activities x 15 minutes with MODERATE ASSIST to improve safety within 3 day(s). (5.)Ms. Mariama Valdovinos will perform LE exercises with 3 to 5 cues for form within 3 days to improve strength for functional transfers ambulation. (6.)Ms. Mariama Valdovinos will maintain stable vital signs throughout all functional mobility within 7 days. LTG: 
(1.)Ms. Mariama Valdovinos will move from supine to sit and sit to supine , scoot up and down and roll side to side in bed with CONTACT GUARD ASSIST within 7 treatment day(s). (2.)Ms. Mariama Valdovinos will transfer from bed to chair and chair to bed with MINIMAL ASSIST using the least restrictive device within 7 treatment day(s). (3.)Ms. Mariama Valdovinos will ambulate with MINIMAL ASSIST for 10 feet with the least restrictive device within 7 treatment day(s). (4.)Ms. Mariama Valdovinos will perform standing static and dynamic balance activities x 15 minutes with MINIMAL ASSIST to improve safety within 7 day(s). ________________________________________________________________________________________________ Outcome: Progressing Towards Goal 
  
PHYSICAL THERAPY: Daily Note and PM 4/18/2019 INPATIENT: PT Visit Days : 1 [place] / [place] B / Product Type: Medicare /   
LLE NWSANGITA  
NAME/AGE/GENDER: Kita Richardson is a 76 y.o. female PRIMARY DIAGNOSIS: Fall [W19. Cruzito Rosendo Ankle fracture [S82.899A] Ankle fracture Ankle fracture Procedure(s) (LRB): FIBULA OPEN REDUCTION INTERNAL FIXATION (Left) 2 Days Post-Op ICD-10: Treatment Diagnosis: · Difficulty in walking, Not elsewhere classified (R26.2) Precaution/Allergies: 
Aspirin ASSESSMENT:  
Ms. Raymond Azul is 76 y.o. Female admitted s/p above surgery. She lives with  in a single story home with a ramp and ambulates limited distances with a rolling walker. Reports her  is unable to assist her due to having cancer. Daughters assists at times. She also admits to 4-5 falls in the past 6 months. She is sitting edge of bed with OT and agreeable to physical therapy evaluation and treatment. RLE 4/5 strength. She stood with maximal assist x2 and unable to achieve full standing as well as maintain her NWB status on LLE. Noted L lateral lean with unsupported sitting, which patient admits she does at home as well. Treatment initiated to include transfer via sliding board with moderate assist x2 (max assist at LLE to maintain NWB while patient performed transfer). Once in chair, patient able to roll and perform multiple bridges to don a new brief with cues for NWB LLE. Birdie Richards is currently functioning below her baseline and would benefit from skilled PT during acute care stay to maximize safety and independence with functional mobility. PM Note: Patient sitting in recliner and agreeable to returning to bed. Able to complete sliding board transfer back to bed with moderate assist x1 and minimal assist of a second person to maintain NWB LLE. Improved scooting and sitting balance appreciated this PM. Returned to supine with moderate assist x1. Good progress in transfers and maintaining her precautions this PM. Will continue therapy efforts. This section established at most recent assessment PROBLEM LIST (Impairments causing functional limitations): 1. Decreased Strength 2. Decreased ADL/Functional Activities 3. Decreased Transfer Abilities 4. Decreased Ambulation Ability/Technique 5. Decreased Balance 6. Increased Pain 7. Decreased Activity Tolerance 8. Decreased Knowledge of Precautions 9. Decreased [place] with Home Exercise Program 
10. Decreased Cognition INTERVENTIONS PLANNED: (Benefits and precautions of physical therapy have been discussed with the patient.) 1. Balance Exercise 2. Bed Mobility 3. Family Education 4. Gait Training 5. Group Therapy 6. Home Exercise Program (HEP) 7. Therapeutic Activites 8. Therapeutic Exercise/Strengthening 9. Transfer Training TREATMENT PLAN: Frequency/Duration: twice daily for duration of hospital stay Rehabilitation Potential For Stated Goals: Good REHAB RECOMMENDATIONS (at time of discharge pending progress):   
Placement: It is my opinion, based on this patient's performance to date, that Ms. Poornima Cruz may benefit from intensive therapy at a [address] [place] [address] after discharge due to her functional deficits (listed above) that are likely to improve with skilled rehabilitation and concerns that she may be unsafe to be unsupervised at home due to inability to maintain NWB LLE without maximal cues and requiring high level assistance to transfer. Equipment:  
? None at this time HISTORY:  
History of Present Injury/Illness (Reason for Referral): 
Patient is a 65yoF with PMH significant for afib on Eliquis, [facility], who presents after a fall. Patient reportedly rolled out of her bed onto the floor and was wrapped up in bedsheets, so she could not get up on her own. Patient's  could also not get her up, so he called their daughter (who is currently at bedside). She arrived and called EMS. Of note, patient apparently also sustained a fall injury yesterday, injuring her ankle. Pt was found to have an ankle fracture on XR, and her ankle is currently splinted. While in the ER, patient became slightly hypoxic on room air. Improved with supplemental oxygen. She denies over SOB or dyspnea. Denies cough/chest pain. Patient will be admitted for PT and further evaluation/intervention for her ankle fracture which will likely need STR. Past Medical History/Comorbidities: Ms. 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), DM type 2 (diabetes mellitus, type 2) ([address] ( 6 yrs), DM type 2 (diabetes mellitus, type 2) ([address] (4/17/2013), DVT (deep venous thrombosis) ([address] Essential hypertension, Essential hypertension (10/21/2016), GERD (gastroesophageal reflux disease) (4/17/2013), GERD (gastroesophageal reflux disease) (4/17/2013), History of non-Hodgkin's lymphoma (12 yrs ago), history of PUD (peptic ulcer disease) (4/17/2013), history of PUD (peptic ulcer disease) (4/17/2013), History of pulmonary embolus (PE) (20+ yrs), History of tobacco abuse (52 yrs), Hyperlipidemia (4/17/2013), Insomnia (4/17/2013), Insomnia (2/30/3917), Metabolic encephalopathy (14/1/5601), Non Hodgkin's lymphoma ([address] (4/17/2013), Obesity (BMI 30-39.9), Pacemaker, Paroxysmal atrial fibrillation ([address] (6/6/2017), Poor historian (05/07/2018), Psychiatric disorder, Pulmonary embolism ([address] and Thyroid disease. She also has no past medical history of Asthma, Autoimmune disease ([address] CAD (coronary artery disease), Chronic kidney disease, Chronic obstructive pulmonary disease ([address] Dementia, Difficult intubation, Heart failure ([address] Infectious disease, Liver disease, Malignant hyperthermia due to anesthesia, Nausea & vomiting, Pseudocholinesterase deficiency, Seizures ([address] Sleep apnea, Stroke ([facility] or Unspecified adverse effect of anesthesia.   MsMatilde Niall Hernadez  has a past surgical history that includes hx syed and bso; hx appendectomy; hx colectomy; hx partial thyroidectomy (5/00); hx fracture tx; hx breast augmentation (40 yrs ago); pr neurological procedure unlisted; hx pacemaker; and hx cataract removal (2010). Social History/Living Environment:  
Home Environment: Private residence # Steps to Enter: 0(ramp) Wheelchair Ramp: Yes One/Two Story Residence: One story Living Alone: No 
Support Systems: Spouse/Significant Other/Partner, Child(tato) Patient Expects to be Discharged to[de-identified] Rehabilitation facility Current DME Used/Available at Home: Commode, bedside, Shower chair, Walker, rolling, Wheelchair Tub or Shower Type: Tub/Shower combination Prior Level of Function/Work/Activity: She lives with  in a single story home with a ramp and ambulates limited distances with a rolling walker. Reports her  is unable to assist her due to having cancer. Daughters assists at times. She also admits to 4-5 falls in the past 6 months. Number of Personal Factors/Comorbidities that affect the Plan of Care: 3+: HIGH COMPLEXITY EXAMINATION:  
Most Recent Physical Functioning:  
Gross Assessment: 
AROM: Generally decreased, functional 
PROM: Generally decreased, functional 
Strength: Generally decreased, functional 
Coordination: Generally decreased, functional 
         
  
Posture: 
Posture (WDL): Exceptions to [facility] Posture Assessment: Forward head, Rounded shoulders Balance: 
Sitting: Impaired Sitting - Static: Good (unsupported) Sitting - Dynamic: Fair (occasional) Standing: Impaired Standing - Static: Poor Bed Mobility: 
  
Wheelchair Mobility: 
  
Transfers: 
Sit to Stand: Maximum assistance;Assist x2 Stand to Sit: Maximum assistance;Assist x2 Bed to Chair: Moderate assistance;Assist x2 Sliding Board : Moderate assistance Interventions: Safety awareness training;Verbal cues; Visual cues Gait: 
  
   
  
Body Structures Involved: 1. Nerves 2. Lungs 3. Bones 4. Joints 5. Muscles 6. Ligaments Body Functions Affected: 1. Sensory/Pain 2. Respiratory 3. Neuromusculoskeletal 
4. Movement Related Activities and Participation Affected: 1. Learning and Applying Knowledge 2. Mobility 3. Self Care 4. Domestic Life 5. Interpersonal Interactions and Relationships 6. Community, Social and [place] Number of elements that affect the Plan of Care: 4+: HIGH COMPLEXITY CLINICAL PRESENTATION:  
Presentation: Evolving clinical presentation with changing clinical characteristics: MODERATE COMPLEXITY CLINICAL DECISION MAKING:  
[facility] AM-PAC 6 Clicks Basic Mobility Inpatient Short Form How much difficulty does the patient currently have. .. Unable A Lot A Little None 1. Turning over in bed (including adjusting bedclothes, sheets and blankets)? ? 1   ? 2   ? 3   ? 4  
2. Sitting down on and standing up from a chair with arms ( e.g., wheelchair, bedside commode, etc.)   ? 1   ? 2   ? 3   ? 4  
3. Moving from lying on back to sitting on the side of the bed?   ? 1   ? 2   ? 3   ? 4 How much help from another person does the patient currently need. .. Total A Lot A Little None 4. Moving to and from a bed to a chair (including a wheelchair)? ? 1   ? 2   ? 3   ? 4  
5. Need to walk in hospital room? ? 1   ? 2   ? 3   ? 4  
6. Climbing 3-5 steps with a railing? ? 1   ? 2   ? 3   ? 4  
© 2007, Trustees of [facility] [employer], under license to [employer]. All rights reserved Score:  Initial: 10 Most Recent: X (Date: -- ) Interpretation of Tool:  Represents activities that are increasingly more difficult (i.e. Bed mobility, Transfers, Gait). Medical Necessity:    
· Patient demonstrates good ·  rehab potential due to higher previous functional level. Reason for Services/Other Comments: 
· Patient continues to require modification of therapeutic interventions to increase complexity of exercises · . Use of outcome tool(s) and clinical judgement create a POC that gives a: Questionable prediction of patient's progress: MODERATE COMPLEXITY  
  
 
 
 
TREATMENT:  
(In addition to Assessment/Re-Assessment sessions the following treatments were rendered) Pre-treatment Symptoms/Complaints:   
Pain: Initial:  
Pain Intensity 1: 5  Post Session:  0/10 Therapeutic Activity: (    8 minutes): Therapeutic activities including Chair transfers and bed transfers mobility, strength and balance. Required moderate manual/verbal cues   to promote static and dynamic balance in sitting. Braces/Orthotics/Lines/Etc:  
· IV 
· O2 Device: Heated, Hi flow nasal cannula Treatment/Session Assessment:   
· Response to Treatment:  Patient performed mod assist x1 with assist to maintain NWB via sliding board. · Interdisciplinary Collaboration:  
o Physical Therapist 
o Occupational Therapist 
o Registered Nurse 
o Student Nurse · After treatment position/precautions:  
o Supine in bed 
o Bed alarm/tab alert on 
o Bed/Chair-wheels locked 
o Bed in low position 
o Call light within reach 
o RN notified 
o Family at bedside · Compliance with Program/Exercises: Will assess as treatment progresses · Recommendations/Intent for next treatment session: \"Next visit will focus on advancements to more challenging activities and reduction in assistance provided\". Total Treatment Duration: PT Patient Time In/Time Out Time In: 9411 Time Out: 3263 Sotero Rater, MELIT

## 2019-04-18 NOTE — PROGRESS NOTES
Problem: Diabetes Self-Management Goal: *Disease process and treatment process Description Define diabetes and identify own type of diabetes; list 3 options for treating diabetes. Outcome: Progressing Towards Goal 
Goal: *Incorporating nutritional management into lifestyle Description Describe effect of type, amount and timing of food on blood glucose; list 3 methods for planning meals. Outcome: Progressing Towards Goal 
Goal: *Incorporating physical activity into lifestyle Description State effect of exercise on blood glucose levels. Outcome: Progressing Towards Goal 
Goal: *Developing strategies to promote health/change behavior Description Define the ABC's of diabetes; identify appropriate screenings, schedule and personal plan for screenings. Outcome: Progressing Towards Goal 
Goal: *Using medications safely Description State effect of diabetes medications on diabetes; name diabetes medication taking, action and side effects. Outcome: Progressing Towards Goal 
Goal: *Monitoring blood glucose, interpreting and using results Description Identify recommended blood glucose targets  and personal targets. Outcome: Progressing Towards Goal 
Goal: *Prevention, detection, treatment of acute complications Description List symptoms of hyper- and hypoglycemia; describe how to treat low blood sugar and actions for lowering  high blood glucose level. Outcome: Progressing Towards Goal 
Goal: *Prevention, detection and treatment of chronic complications Description Define the natural course of diabetes and describe the relationship of blood glucose levels to long term complications of diabetes. Outcome: Progressing Towards Goal 
Goal: *Developing strategies to address psychosocial issues Description Describe feelings about living with diabetes; identify support needed and support network Outcome: Progressing Towards Goal 
Goal: *Insulin pump training Outcome: Progressing Towards Goal 
 Goal: *Sick day guidelines Outcome: Progressing Towards Goal 
Goal: *Patient Specific Goal (EDIT GOAL, INSERT TEXT) Outcome: Progressing Towards Goal 
  
Problem: Patient Education: Go to Patient Education Activity Goal: Patient/Family Education Outcome: Progressing Towards Goal 
  
Problem: Pressure Injury - Risk of 
Goal: *Prevention of pressure injury Description Document Jatinder Scale and appropriate interventions in the flowsheet. Outcome: Progressing Towards Goal 
  
Problem: Patient Education: Go to Patient Education Activity Goal: Patient/Family Education Outcome: Progressing Towards Goal 
  
Problem: Falls - Risk of 
Goal: *Absence of Falls Description Document Katrina Tsai Fall Risk and appropriate interventions in the flowsheet. Outcome: Progressing Towards Goal 
  
Problem: Patient Education: Go to Patient Education Activity Goal: Patient/Family Education Outcome: Progressing Towards Goal 
  
Problem: Interdisciplinary Rounds Goal: Interdisciplinary Rounds Outcome: Progressing Towards Goal 
  
Problem: Patient Education: Go to Patient Education Activity Goal: Patient/Family Education Outcome: Progressing Towards Goal 
  
Problem: Lower Extremity Fracture:Post-op Day 2 Goal: Off Pathway (Use only if patient is Off Pathway) Outcome: Progressing Towards Goal 
Goal: Activity/Safety Outcome: Progressing Towards Goal 
Goal: Diagnostic Test/Procedures Outcome: Progressing Towards Goal 
Goal: Nutrition/Diet Outcome: Progressing Towards Goal 
Goal: Discharge Planning Outcome: Progressing Towards Goal 
Goal: Medications Outcome: Progressing Towards Goal 
Goal: Respiratory Outcome: Progressing Towards Goal 
Goal: Treatments/Interventions/Procedures Outcome: Progressing Towards Goal 
Goal: Psychosocial 
Outcome: Progressing Towards Goal 
Goal: *Optimal pain control at patient's stated goal 
Outcome: Progressing Towards Goal 
Goal: *Hemodynamically stable Outcome: Progressing Towards Goal 
 Goal: *Adequate oxygenation Outcome: Progressing Towards Goal 
Goal: *PT/INR within defined limits Outcome: Progressing Towards Goal 
Goal: *Demonstrates progressive activity Outcome: Progressing Towards Goal 
Goal: *Voiding Outcome: Progressing Towards Goal 
Goal: *Bowel movement Outcome: Progressing Towards Goal 
Goal: *Tolerating diet Outcome: Progressing Towards Goal 
  
Problem: Lower Extremity Fracture:Post-op Day 3 Goal: Off Pathway (Use only if patient is Off Pathway) Outcome: Progressing Towards Goal 
Goal: Activity/Safety Outcome: Progressing Towards Goal 
Goal: Diagnostic Test/Procedures Outcome: Progressing Towards Goal 
Goal: Nutrition/Diet Outcome: Progressing Towards Goal 
Goal: Discharge Planning Outcome: Progressing Towards Goal 
Goal: Medications Outcome: Progressing Towards Goal 
Goal: Respiratory Outcome: Progressing Towards Goal 
Goal: Treatments/Interventions/Procedures Outcome: Progressing Towards Goal 
Goal: Psychosocial 
Outcome: Progressing Towards Goal 
Goal: *Optimal pain control at patient's stated goal 
Outcome: Progressing Towards Goal 
Goal: *Hemodynamically stable Outcome: Progressing Towards Goal 
Goal: *Adequate oxygenation Outcome: Progressing Towards Goal 
Goal: *PT/INR within defined limits Outcome: Progressing Towards Goal 
Goal: *Demonstrates progressive activity Outcome: Progressing Towards Goal 
Goal: *Voiding Outcome: Progressing Towards Goal 
Goal: *Bowel movement Outcome: Progressing Towards Goal 
Goal: *Tolerating diet Outcome: Progressing Towards Goal 
  
Problem: Lower Extremity Fracture:Post-op Day 4 Goal: Off Pathway (Use only if patient is Off Pathway) Outcome: Progressing Towards Goal 
Goal: Activity/Safety Outcome: Progressing Towards Goal 
Goal: Diagnostic Test/Procedures Outcome: Progressing Towards Goal 
Goal: Nutrition/Diet Outcome: Progressing Towards Goal 
Goal: Discharge Planning Outcome: Progressing Towards Goal 
 Goal: Medications Outcome: Progressing Towards Goal 
Goal: Respiratory Outcome: Progressing Towards Goal 
Goal: Treatments/Interventions/Procedures Outcome: Progressing Towards Goal 
Goal: Psychosocial 
Outcome: Progressing Towards Goal 
  
Problem: Lower Extremity Fracture:Discharge Outcomes Goal: *Hemodynamically stable Outcome: Progressing Towards Goal 
Goal: *Modified independence with transfers, ambulation on levels with assistance devices, stair climbing, ADL's Outcome: Progressing Towards Goal 
Goal: *Independent with active exercises Outcome: Progressing Towards Goal 
Goal: *Describes follow-up/return visits to physicians Outcome: Progressing Towards Goal 
Goal: *Tolerating diet Outcome: Progressing Towards Goal 
Goal: *Optimal pain control at patient's stated goal 
Outcome: Progressing Towards Goal 
Goal: *Adequate air exchange Outcome: Progressing Towards Goal 
Goal: *Lungs clear or at baseline Outcome: Progressing Towards Goal 
Goal: *Afebrile Outcome: Progressing Towards Goal 
Goal: *Incision intact without signs of infection, redness, warmth Outcome: Progressing Towards Goal 
Goal: *Absence of deep venous thrombosis signs and symptoms(Stroke Metric) Outcome: Progressing Towards Goal 
Goal: *Active bowel function Outcome: Progressing Towards Goal 
Goal: *Adequate urinary output Outcome: Progressing Towards Goal 
Goal: *Discharge anxiety minimal 
Outcome: Progressing Towards Goal 
Goal: *Describes available resources and support systems Outcome: Progressing Towards Goal

## 2019-04-18 NOTE — PROGRESS NOTES
No response from first choice, [facility], regarding STR referrals. Beds offered at [facility]. CM spoke with patient about her preferences. She is agreeable to accepting bed at [facility]. Patient has already had required 3 inpatient midnights to qualify for STR per Medicare guidelines. Patient has also had PPDs placed and read. Awaiting medical readiness for discharge to STR.

## 2019-04-18 NOTE — PROGRESS NOTES
Patient was difficult to arouse upon arrival. Breaths were even and unlabored. Cold wet washcloth rubbed on her face, she was stimulated and she awoke. Now Mrs. Adams is sitting up in chair watching tv and communicating. She is awake and alert.

## 2019-04-18 NOTE — PROGRESS NOTES
CM sent fax referrals to [facility], [facility], [facility], and [facility]. Awaiting responses.

## 2019-04-18 NOTE — PROGRESS NOTES
Problem: Self Care Deficits Care Plan (Adult) Goal: *Acute Goals and Plan of Care (Insert Text) Description 1. Pt will toilet with mod assistance 2. Pt will complete functional transfers for ADLs with mod assistance 3. Pt will maintain LLE NWB status without cues during ADLs and mobility for ADLs 4. Pt will maintain standing balance for ADLs with CGA 5. Pt will demonstrate independence with HEP to promote increased BUE strength and functional use for ADLs and mobility for ADLs 6. Pt will maintain sitting balance for ADLs with SBA 7. Pt will complete bed mobility with min assistance in prep for ADLs Timeframe: 7 days Outcome: Progressing Towards Goal 
  
OCCUPATIONAL THERAPY: Initial Assessment 4/18/2019 INPATIENT:   
[place] / [place] / Product Type: Medicare /  
  
NAME/AGE/GENDER: Demetris Uribe is a 76 y.o. female PRIMARY DIAGNOSIS:  Fall [W19. Josefine Chris Ankle fracture [S82.899A] Ankle fracture Ankle fracture Procedure(s) (LRB): FIBULA OPEN REDUCTION INTERNAL FIXATION (Left) 2 Days Post-Op ICD-10: Treatment Diagnosis:  
 Repeated Falls (R29.6) History of falling (Z91.81) Precautions/Allergies: LLE NWB, falls Aspirin ASSESSMENT:  
Ms. Derian Simms was admitted with L ankle fracture d/t falling out of bed at home, s/p ORIF, LLE NWB. Pt lives with her  and requires assistance from daughter for bathing, dressing, toileting, and for some hygiene. Pt uses a RW for mobility, endorses multiple recent falls. This session, pt presented with deficits in mobility, balance, cognition, endurance, and strength impacting ADLs. Pt somewhat confused and impulsive at times, decreased insight to deficits. Pt required max assistance for transfer from supine to sitting and to scoot to the edge, max X2 to stand.  Pt demonstrated decreased sitting balance with L side lean, required CGA-min assistance to maintain sitting balance with frequent cues to address. BUE strength is generally decreased with < ROM at B shoulders. Pt remained with PT for evaluation/ treatment. Pt is below her functional baseline and would benefit from skilled OT services to address deficits, recommend d/c to SNF. This section established at most recent assessment PROBLEM LIST (Impairments causing functional limitations): 
Decreased Strength Decreased ADL/Functional Activities Decreased Transfer Abilities Decreased Balance Decreased Activity Tolerance Increased Fatigue Decreased Knowledge of Precautions Decreased Cognition INTERVENTIONS PLANNED: (Benefits and precautions of occupational therapy have been discussed with the patient.) Activities of daily living training Adaptive equipment training Balance training Cognitive training Therapeutic activity Therapeutic exercise TREATMENT PLAN: Frequency/Duration: Follow patient 3 times/ week to address above goals. Rehabilitation Potential For Stated Goals: Good REHAB RECOMMENDATIONS (at time of discharge pending progress):   
Placement: It is my opinion, based on this patient's performance to date, that Ms. Nestor Dolan may benefit from intensive therapy at a [address] [place] [address] after discharge due to her functional deficits (listed above) that are likely to improve with skilled rehabilitation and concerns that she may be unsafe to be unsupervised at home due to inability to complete ADLs/ fall risk. Equipment:  
None at this time OCCUPATIONAL PROFILE AND HISTORY:  
History of Present Injury/Illness (Reason for Referral): 
See H&P Past Medical History/Comorbidities: Ms. 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), DM type 2 (diabetes mellitus, type 2) ([address] ( 6 yrs), DM type 2 (diabetes mellitus, type 2) ([address] (4/17/2013), DVT (deep venous thrombosis) ([address] Essential hypertension, Essential hypertension (10/21/2016), GERD (gastroesophageal reflux disease) (4/17/2013), GERD (gastroesophageal reflux disease) (4/17/2013), History of non-Hodgkin's lymphoma (12 yrs ago), history of PUD (peptic ulcer disease) (4/17/2013), history of PUD (peptic ulcer disease) (4/17/2013), History of pulmonary embolus (PE) (20+ yrs), History of tobacco abuse (52 yrs), Hyperlipidemia (4/17/2013), Insomnia (4/17/2013), Insomnia (3/89/9156), Metabolic encephalopathy (81/7/1790), Non Hodgkin's lymphoma ([address] (4/17/2013), Obesity (BMI 30-39.9), Pacemaker, Paroxysmal atrial fibrillation ([address] (6/6/2017), Poor historian (05/07/2018), Psychiatric disorder, Pulmonary embolism ([address] and Thyroid disease. She also has no past medical history of Asthma, Autoimmune disease ([address] CAD (coronary artery disease), Chronic kidney disease, Chronic obstructive pulmonary disease ([address] Dementia, Difficult intubation, Heart failure ([address] Infectious disease, Liver disease, Malignant hyperthermia due to anesthesia, Nausea & vomiting, Pseudocholinesterase deficiency, Seizures ([address] Sleep apnea, Stroke ([address] or Unspecified adverse effect of anesthesia. Ms. Vince Antony  has a past surgical history that includes hx syed and bso; hx appendectomy; hx colectomy; hx partial thyroidectomy (5/00); hx fracture tx; hx breast augmentation (40 yrs ago); pr neurological procedure unlisted; hx pacemaker; and hx cataract removal (2010). Social History/Living Environment:  
Home Environment: Private residence # Steps to Enter: 0(ramp) Wheelchair Ramp: Yes One/Two Story Residence: One story Living Alone: No 
Support Systems: Spouse/Significant Other/Partner, Child(tato) Patient Expects to be Discharged to[de-identified] Rehabilitation facility Current DME Used/Available at Home: Commode, bedside, Shower chair, Walker, rolling, Wheelchair Tub or Shower Type: Tub/Shower combination Prior Level of Function/Work/Activity: 
Daughter assists w/ ADLs, lives w/ , uses RW, frequent falls Number of Personal Factors/Comorbidities that affect the Plan of Care: Expanded review of therapy/medical records (1-2):  MODERATE COMPLEXITY ASSESSMENT OF OCCUPATIONAL PERFORMANCE[de-identified]  
Activities of Daily Living:  
Basic ADLs (From Assessment) Complex ADLs (From Assessment) Feeding: Setup Oral Facial Hygiene/Grooming: Minimum assistance Bathing: Maximum assistance Upper Body Dressing: Moderate assistance Lower Body Dressing: Total assistance Toileting: Total assistance Instrumental ADL Meal Preparation: Total assistance Homemaking: Total assistance Grooming/Bathing/Dressing Activities of Daily Living Cognitive Retraining Safety/Judgement: Fall prevention;Decreased insight into deficits Bed/Mat Mobility Supine to Sit: Maximum assistance Sit to Stand: Maximum assistance;Assist x2 Stand to Sit: Maximum assistance;Assist x2 Bed to Chair: Moderate assistance;Assist x2 Most Recent Physical Functioning:  
Gross Assessment: 
AROM: Generally decreased, functional 
Strength: Generally decreased, functional 
Coordination: Generally decreased, functional 
         
  
Posture: 
Posture (WDL): Exceptions to [facility] Posture Assessment: Forward head, Rounded shoulders Balance: 
Sitting: Impaired Sitting - Static: Fair (occasional) Sitting - Dynamic: Fair (occasional) Standing: Impaired Standing - Static: Poor Bed Mobility: 
Supine to Sit: Maximum assistance Wheelchair Mobility: 
  
Transfers: 
Sit to Stand: Maximum assistance;Assist x2 Stand to Sit: Maximum assistance;Assist x2 Bed to Chair: Moderate assistance;Assist x2 Patient Vitals for the past 6 hrs: 
 BP BP Patient Position SpO2 O2 Flow Rate (L/min) Pulse 04/18/19 0524   94 % 50 l/min   
04/18/19 0730 143/89 At rest 93 %  (!) 102  
04/18/19 0731   94 % 50 l/min Mental Status Neurologic State: Alert, Confused Orientation Level: Oriented to person, Oriented to situation, Oriented to place Cognition: Decreased attention/concentration, Decreased command following, Impulsive Perception: Appears intact Perseveration: No perseveration noted Safety/Judgement: Fall prevention, Decreased insight into deficits Physical Skills Involved: 
Balance Activity Tolerance Pain (acute) Cognitive Skills Affected (resulting in the inability to perform in a timely and safe manner): Executive Function Short Term Recall Sustained Attention Divided Attention Psychosocial Skills Affected: 
Habits/Routines Environmental Adaptation Self-Awareness Number of elements that affect the Plan of Care: 3-5:  MODERATE COMPLEXITY CLINICAL DECISION MAKING:  
[facility] AM-PAC? ?6 Clicks? Daily Activity Inpatient Short Form How much help from another person does the patient currently need. .. Total A Lot A Little None 1. Putting on and taking off regular lower body clothing? ? 1   ? 2   ? 3   ? 4  
2. Bathing (including washing, rinsing, drying)? ? 1   ? 2   ? 3   ? 4  
3. Toileting, which includes using toilet, bedpan or urinal?   ? 1   ? 2   ? 3   ? 4  
4. Putting on and taking off regular upper body clothing? ? 1   ? 2   ? 3   ? 4  
5. Taking care of personal grooming such as brushing teeth? ? 1   ? 2   ? 3   ? 4  
6. Eating meals? ? 1   ? 2   ? 3   ? 4  
© 2007, Trustees of [facility], under license to [employer]. All rights reserved Score:  Initial: 12 Most Recent: X (Date: -- ) Interpretation of Tool:  Represents activities that are increasingly more difficult (i.e. Bed mobility, Transfers, Gait).  
 
Medical Necessity:    
Patient is expected to demonstrate progress in strength, balance and functional technique 
 to increase independence with ADLs Kylee Bryant Reason for Services/Other Comments: 
Patient continues to require skilled intervention due to decreased ADLs and functional performance from baseline Kylee Bryant Use of outcome tool(s) and clinical judgement create a POC that gives a: LOW COMPLEXITY  
 
 
 
TREATMENT:  
(In addition to Assessment/Re-Assessment sessions the following treatments were rendered) Pre-treatment Symptoms/Complaints:   
Pain: Initial:  
Pain Intensity 1: 0  Post Session:  0 Assessment/Reassessment only, no treatment provided today Braces/Orthotics/Lines/Etc:  
IV 
O2 Device: Heated, Hi flow nasal cannula Treatment/Session Assessment:   
Response to Treatment:  no adverse reaction Interdisciplinary Collaboration:  
Physical Therapist 
Occupational Therapist 
Registered Nurse After treatment position/precautions:  
Remained w/ PT Compliance with Program/Exercises: Will assess as treatment progresses. Recommendations/Intent for next treatment session: \"Next visit will focus on advancements to more challenging activities and reduction in assistance provided\". Total Treatment Duration: OT Patient Time In/Time Out Time In: 2875 Time Out: 0900 Brianne Doshi OT

## 2019-04-18 NOTE — PROGRESS NOTES
Problem: Mobility Impaired (Adult and Pediatric) Goal: *Acute Goals and Plan of Care (Insert Text) Description STG: 
(1.)Ms. Derian Simms will move from supine to sit and sit to supine , scoot up and down and roll side to side with MINIMAL ASSIST within 3 treatment day(s). (2.)Ms. Derian Simms will transfer from bed to chair and chair to bed with MODERATE ASSIST using the least restrictive device within 3 treatment day(s). (3.)Ms. Derian Simms will ambulate with MODERATE ASSIST for 2 feet with the least restrictive device within 3 treatment day(s). (4.)Ms. Derian Simms will perform standing static and dynamic balance activities x 15 minutes with MODERATE ASSIST to improve safety within 3 day(s). (5.)Ms. Derian Simms will perform LE exercises with 3 to 5 cues for form within 3 days to improve strength for functional transfers ambulation. (6.)Ms. Derian Simms will maintain stable vital signs throughout all functional mobility within 7 days. LTG: 
(1.)Ms. Derian Simms will move from supine to sit and sit to supine , scoot up and down and roll side to side in bed with CONTACT GUARD ASSIST within 7 treatment day(s). (2.)Ms. Derian Simms will transfer from bed to chair and chair to bed with MINIMAL ASSIST using the least restrictive device within 7 treatment day(s). (3.)Ms. Derian Simms will ambulate with MINIMAL ASSIST for 10 feet with the least restrictive device within 7 treatment day(s). (4.)Ms. Derian Simms will perform standing static and dynamic balance activities x 15 minutes with MINIMAL ASSIST to improve safety within 7 day(s). ________________________________________________________________________________________________ Outcome: Progressing Towards Goal 
  
PHYSICAL THERAPY: Initial Assessment, Daily Note and AM 4/18/2019 INPATIENT: PT Visit Days : 1 [place] / Product Type: Medicare /   
LLE NWB  
NAME/AGE/GENDER: Demetris Uribe is a 76 y.o. female PRIMARY DIAGNOSIS: Fall [W19. Ramandeep Sow Ankle fracture [S82.899A] Ankle fracture Ankle fracture Procedure(s) (LRB): FIBULA OPEN REDUCTION INTERNAL FIXATION (Left) 2 Days Post-Op ICD-10: Treatment Diagnosis: · Difficulty in walking, Not elsewhere classified (R26.2) Precaution/Allergies: 
Aspirin ASSESSMENT:  
Ms. Lupe Huizar is 76 y.o. Female admitted s/p above surgery. She lives with  in a single story home with a ramp and ambulates limited distances with a rolling walker. Reports her  is unable to assist her due to having cancer. Daughters assists at times. She also admits to 4-5 falls in the past 6 months. She is sitting edge of bed with OT and agreeable to physical therapy evaluation and treatment. RLE 4/5 strength. She stood with maximal assist x2 and unable to achieve full standing as well as maintain her NWB status on LLE. Noted L lateral lean with unsupported sitting, which patient admits she does at home as well. Treatment initiated to include transfer via sliding board with moderate assist x2 (max assist at LLE to maintain NWB while patient performed transfer). Once in chair, patient able to roll and perform multiple bridges to don a new brief with cues for NWB LLE. Ti Jeronimo is currently functioning below her baseline and would benefit from skilled PT during acute care stay to maximize safety and independence with functional mobility. This section established at most recent assessment PROBLEM LIST (Impairments causing functional limitations): 1. Decreased Strength 2. Decreased ADL/Functional Activities 3. Decreased Transfer Abilities 4. Decreased Ambulation Ability/Technique 5. Decreased Balance 6. Increased Pain 7. Decreased Activity Tolerance 8. Decreased Knowledge of Precautions 9. Decreased [place] with Home Exercise Program 
10. Decreased Cognition  INTERVENTIONS PLANNED: (Benefits and precautions of physical therapy have been discussed with the patient.) 1. Balance Exercise 2. Bed Mobility 3. Family Education 4. Gait Training 5. Group Therapy 6. Home Exercise Program (HEP) 7. Therapeutic Activites 8. Therapeutic Exercise/Strengthening 9. Transfer Training TREATMENT PLAN: Frequency/Duration: twice daily for duration of hospital stay Rehabilitation Potential For Stated Goals: Good REHAB RECOMMENDATIONS (at time of discharge pending progress):   
Placement: It is my opinion, based on this patient's performance to date, that Ms. Teresa Valencia may benefit from intensive therapy at a [address] [place] [address] after discharge due to her functional deficits (listed above) that are likely to improve with skilled rehabilitation and concerns that she may be unsafe to be unsupervised at home due to inability to maintain NWB LLE without maximal cues and requiring high level assistance to transfer. Equipment:  
? None at this time HISTORY:  
History of Present Injury/Illness (Reason for Referral): 
Patient is a 65yoF with PMH significant for afib on Eliquis, [facility], who presents after a fall. Patient reportedly rolled out of her bed onto the floor and was wrapped up in bedsheets, so she could not get up on her own. Patient's  could also not get her up, so he called their daughter (who is currently at bedside). She arrived and called EMS. Of note, patient apparently also sustained a fall injury yesterday, injuring her ankle. Pt was found to have an ankle fracture on XR, and her ankle is currently splinted. While in the ER, patient became slightly hypoxic on room air. Improved with supplemental oxygen. She denies over SOB or dyspnea. Denies cough/chest pain. Patient will be admitted for PT and further evaluation/intervention for her ankle fracture which will likely need STR. Past Medical History/Comorbidities: Ms. 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), DM type 2 (diabetes mellitus, type 2) ([address] ( 6 yrs), DM type 2 (diabetes mellitus, type 2) ([address] (4/17/2013), DVT (deep venous thrombosis) ([address] Essential hypertension, Essential hypertension (10/21/2016), GERD (gastroesophageal reflux disease) (4/17/2013), GERD (gastroesophageal reflux disease) (4/17/2013), History of non-Hodgkin's lymphoma (12 yrs ago), history of PUD (peptic ulcer disease) (4/17/2013), history of PUD (peptic ulcer disease) (4/17/2013), History of pulmonary embolus (PE) (20+ yrs), History of tobacco abuse (52 yrs), Hyperlipidemia (4/17/2013), Insomnia (4/17/2013), Insomnia (1/38/3293), Metabolic encephalopathy (59/0/3942), Non Hodgkin's lymphoma ([address] (4/17/2013), Obesity (BMI 30-39.9), Pacemaker, Paroxysmal atrial fibrillation ([address] (6/6/2017), Poor historian (05/07/2018), Psychiatric disorder, Pulmonary embolism ([address] and Thyroid disease. She also has no past medical history of Asthma, Autoimmune disease ([address] CAD (coronary artery disease), Chronic kidney disease, Chronic obstructive pulmonary disease ([address] Dementia, Difficult intubation, Heart failure ([address] Infectious disease, Liver disease, Malignant hyperthermia due to anesthesia, Nausea & vomiting, Pseudocholinesterase deficiency, Seizures ([address] Sleep apnea, Stroke ([address] or Unspecified adverse effect of anesthesia. Ms. Melba Restrepo  has a past surgical history that includes hx syed and bso; hx appendectomy; hx colectomy; hx partial thyroidectomy (5/00); hx fracture tx; hx breast augmentation (40 yrs ago); pr neurological procedure unlisted; hx pacemaker; and hx cataract removal (2010). Social History/Living Environment:  
Home Environment: Private residence # Steps to Enter: 0(ramp) Wheelchair Ramp: Yes One/Two Story Residence: One story Living Alone: No 
Support Systems: Spouse/Significant Other/Partner, Child(ttao) Patient Expects to be Discharged to[de-identified] Private residence Current DME Used/Available at Home: Commode, bedside, Grab bars, Shower chair, [employer] Prior Level of Function/Work/Activity: She lives with  in a single story home with a ramp and ambulates limited distances with a rolling walker. Reports her  is unable to assist her due to having cancer. Daughters assists at times. She also admits to 4-5 falls in the past 6 months. Number of Personal Factors/Comorbidities that affect the Plan of Care: 3+: HIGH COMPLEXITY EXAMINATION:  
Most Recent Physical Functioning:  
Gross Assessment: 
AROM: Generally decreased, functional 
PROM: Generally decreased, functional 
Strength: Generally decreased, functional 
Coordination: Generally decreased, functional 
         
  
Posture: 
Posture (WDL): Exceptions to [facility] Posture Assessment: Forward head, Rounded shoulders Balance: 
Sitting: Impaired Sitting - Static: Fair (occasional) Sitting - Dynamic: Fair (occasional) Standing: Impaired Standing - Static: Poor Bed Mobility: 
  
Wheelchair Mobility: 
  
Transfers: 
Sit to Stand: Maximum assistance;Assist x2 Stand to Sit: Maximum assistance;Assist x2 Bed to Chair: Moderate assistance;Assist x2 Gait: 
  
   
  
Body Structures Involved: 1. Nerves 2. Lungs 3. Bones 4. Joints 5. Muscles 6. Ligaments Body Functions Affected: 1. Sensory/Pain 2. Respiratory 3. Neuromusculoskeletal 
4. Movement Related Activities and Participation Affected: 1. Learning and Applying Knowledge 2. Mobility 3. Self Care 4. Domestic Life 5. Interpersonal Interactions and Relationships 6. Community, Social and [place] Number of elements that affect the Plan of Care: 4+: HIGH COMPLEXITY CLINICAL PRESENTATION:  
Presentation: Evolving clinical presentation with changing clinical characteristics: MODERATE COMPLEXITY CLINICAL DECISION MAKIN[address] [place] [address] AM-PAC 6 Clicks Basic Mobility Inpatient Short Form How much difficulty does the patient currently have. .. Unable A Lot A Little None 1. Turning over in bed (including adjusting bedclothes, sheets and blankets)? ? 1   ? 2   ? 3   ? 4  
2. Sitting down on and standing up from a chair with arms ( e.g., wheelchair, bedside commode, etc.)   ? 1   ? 2   ? 3   ? 4  
3. Moving from lying on back to sitting on the side of the bed?   ? 1   ? 2   ? 3   ? 4 How much help from another person does the patient currently need. .. Total A Lot A Little None 4. Moving to and from a bed to a chair (including a wheelchair)? ? 1   ? 2   ? 3   ? 4  
5. Need to walk in hospital room? ? 1   ? 2   ? 3   ? 4  
6. Climbing 3-5 steps with a railing? ? 1   ? 2   ? 3   ? 4  
© 2007, Trustees of [address] [place], [place] [id number], under license to [employer]. All rights reserved Score:  Initial: 10 Most Recent: X (Date: -- ) Interpretation of Tool:  Represents activities that are increasingly more difficult (i.e. Bed mobility, Transfers, Gait). Medical Necessity:    
· Patient demonstrates good ·  rehab potential due to higher previous functional level. Reason for Services/Other Comments: 
· Patient continues to require modification of therapeutic interventions to increase complexity of exercises · . Use of outcome tool(s) and clinical judgement create a POC that gives a: Questionable prediction of patient's progress: MODERATE COMPLEXITY  
  
 
 
 
TREATMENT:  
(In addition to Assessment/Re-Assessment sessions the following treatments were rendered) Pre-treatment Symptoms/Complaints:   
Pain: Initial:  
Pain Intensity 1: 0  Post Session:  0/10 Therapeutic Activity: (    8 minutes): Therapeutic activities including Chair transfers, rolling and bridges  to improve mobility, strength and balance.   Required moderate manual/verbal cues   to promote static and dynamic balance in sitting. Braces/Orthotics/Lines/Etc:  
· IV 
· O2 Device: Heated, Hi flow nasal cannula Treatment/Session Assessment:   
· Response to Treatment:  Patient performed mod assist x2 via sliding board. · Interdisciplinary Collaboration:  
o Physical Therapist 
o Occupational Therapist 
o Registered Nurse 
o Student Nurse · After treatment position/precautions:  
o Up in chair 
o Bed alarm/tab alert on 
o Bed/Chair-wheels locked 
o Bed in low position 
o Call light within reach 
o RN notified 
o Student Nurse at bedside · Compliance with Program/Exercises: Will assess as treatment progresses · Recommendations/Intent for next treatment session: \"Next visit will focus on advancements to more challenging activities and reduction in assistance provided\". Total Treatment Duration: PT Patient Time In/Time Out Time In: 0900 Time Out: 6400 Marshal Gerardo DPT

## 2019-04-18 NOTE — PROGRESS NOTES
Hospitalist Progress Note Admit Date:  2019  2:51 AM  
Name:  Kita Richardson Age:  76 y.o. 
:  1945 MRN:  [id number] PCP:  Margarita Daniels MD 
Treatment Team: Attending Provider: Cathie Brown DO; Consulting Provider: Azul Ingram MD; Consulting Provider: Jeannie Rodríguez DO; Utilization Review: Michael Worthington RN; Consulting Provider: David Hackett MD; Care Manager: Stephanie Bernal; Consulting Provider: Carmen Mancini MD; Occupational Therapist: Glo Cruz OT; Physical Therapist: Susie Gilliam DPT Subjective:  
 
From previous notes: \"67 y.o. Female with PMH Afib on Eliquis, DM II and congenital kidney d(with 1 kidney) was admitted for left ankle fracture after she sustained a fall when she off her bed. Pt is laying in bed with left ankle splint on. Reports she is hurting at her right ribs where she hit it at a night stand when she fell. Right rib pain is worse only when she takes a deep breath. Has some cough, but denies shortness of breath or chest pain. \" 
  
 - post op day #2 orif Left ankle . Continue to hold Brewster Nones. More alert today. Analgesia effective 
  
Review of systems negative except stated above. Objective:  
 
Patient Vitals for the past 24 hrs: 
 Temp Pulse Resp BP SpO2  
19 1100 98.6 °F (37 °C) 86 18 111/68 96 % 19 0731     94 % 19 0730 98.6 °F (37 °C) (!) 102 18 143/89 93 % 19 0524     94 % 19 0311 99.1 °F (37.3 °C) (!) 102 17 135/87 93 % 19 0019     93 % 19 2352 99.4 °F (37.4 °C) 95 17 118/65 91 % 19 1945     97 % 19 1944 99.3 °F (37.4 °C) 83 17 116/54 93 % 19 1515 (!) 101.5 °F (38.6 °C) 97 17 128/73 92 % 19 1427     94 % Oxygen Therapy O2 Sat (%): 96 % (19 1100) Pulse via Oximetry: 105 beats per minute (19) O2 Device: Heated; Hi flow nasal cannula (19) O2 Flow Rate (L/min): 50 l/min (04/18/19 0731) O2 Temperature: 93.2 °F (34 °C) (04/18/19 0524) FIO2 (%): 40 % (04/18/19 0731) No intake or output data in the 24 hours ending 04/18/19 1348 REVIEW OF SYSTEMS: Comprehensive ROS performed and negative except as stated in HPI. Physical Examination: 
Physical Exam  
Constitutional: No distress. More alert HENT:  
Head: Atraumatic. Nose: Nose normal.  
Eyes: Conjunctivae and EOM are normal.  
Neck: No JVD present. Cardiovascular: Normal rate. Exam reveals no friction rub. Pulmonary/Chest: No respiratory distress. She has no wheezes. Still with high flow nasal cannula Abdominal: Bowel sounds are normal. She exhibits no distension. Musculoskeletal: She exhibits no tenderness or deformity. Lymphadenopathy:  
  She has no cervical adenopathy. Neurological: She is alert. GCS score is 15. Skin: Skin is dry. No rash noted. She is not diaphoretic. Psychiatric:  
Short term memory/recall poor but receiving narcotics Data Review: 
I have reviewed all labs, meds, telemetry events, and studies from the last 24 hours. Recent Results (from the past 24 hour(s)) GLUCOSE, POC Collection Time: 04/17/19  4:11 PM  
Result Value Ref Range Glucose (POC) 222 (H) 65 - 100 mg/dL URINALYSIS W/ RFLX MICROSCOPIC Collection Time: 04/17/19  6:42 PM  
Result Value Ref Range Color YELLOW Appearance CLEAR Specific gravity 1.013 1.001 - 1.023    
 pH (UA) 5.0 5.0 - 9.0 Protein NEGATIVE  NEG mg/dL Glucose NEGATIVE  mg/dL Ketone NEGATIVE  NEG mg/dL Bilirubin NEGATIVE  NEG Blood NEGATIVE  NEG Urobilinogen 0.2 0.2 - 1.0 EU/dL Nitrites NEGATIVE  NEG Leukocyte Esterase NEGATIVE  NEG    
CULTURE, URINE Collection Time: 04/17/19  6:42 PM  
Result Value Ref Range Special Requests: NO SPECIAL REQUESTS Culture result:     
  NO GROWTH AFTER SHORT PERIOD OF INCUBATION.  FURTHER RESULTS TO FOLLOW AFTER OVERNIGHT INCUBATION. PLEASE READ & DOCUMENT PPD TEST IN 72 HRS Collection Time: 04/17/19  8:30 PM  
Result Value Ref Range PPD Negative Negative  
 mm Induration 0 0 - 5 mm GLUCOSE, POC Collection Time: 04/17/19  9:49 PM  
Result Value Ref Range Glucose (POC) 216 (H) 65 - 100 mg/dL LACTIC ACID Collection Time: 04/17/19 10:04 PM  
Result Value Ref Range Lactic acid 1.7 0.4 - 2.0 MMOL/L  
METABOLIC PANEL, BASIC Collection Time: 04/18/19  4:12 AM  
Result Value Ref Range Sodium 137 136 - 145 mmol/L Potassium 4.0 3.5 - 5.1 mmol/L Chloride 100 98 - 107 mmol/L  
 CO2 31 21 - 32 mmol/L Anion gap 6 (L) 7 - 16 mmol/L Glucose 172 (H) 65 - 100 mg/dL BUN 19 8 - 23 MG/DL Creatinine 0.84 0.6 - 1.0 MG/DL  
 GFR est AA >60 >60 ml/min/1.73m2 GFR est non-AA >60 >60 ml/min/1.73m2 Calcium 9.1 8.3 - 10.4 MG/DL  
CBC WITH AUTOMATED DIFF Collection Time: 04/18/19  4:12 AM  
Result Value Ref Range WBC 8.3 4.3 - 11.1 K/uL  
 RBC 4.05 4.05 - 5.2 M/uL  
 HGB 11.1 (L) 11.7 - 15.4 g/dL HCT 38.5 35.8 - 46.3 % MCV 95.1 79.6 - 97.8 FL  
 MCH 27.4 26.1 - 32.9 PG  
 MCHC 28.8 (L) 31.4 - 35.0 g/dL  
 RDW 14.0 11.9 - 14.6 % PLATELET 965 054 - 967 K/uL MPV 11.2 9.4 - 12.3 FL ABSOLUTE NRBC 0.00 0.0 - 0.2 K/uL  
 DF AUTOMATED NEUTROPHILS 68 43 - 78 % LYMPHOCYTES 19 13 - 44 % MONOCYTES 11 4.0 - 12.0 % EOSINOPHILS 1 0.5 - 7.8 % BASOPHILS 1 0.0 - 2.0 % IMMATURE GRANULOCYTES 0 0.0 - 5.0 %  
 ABS. NEUTROPHILS 5.6 1.7 - 8.2 K/UL  
 ABS. LYMPHOCYTES 1.6 0.5 - 4.6 K/UL  
 ABS. MONOCYTES 0.9 0.1 - 1.3 K/UL  
 ABS. EOSINOPHILS 0.1 0.0 - 0.8 K/UL  
 ABS. BASOPHILS 0.1 0.0 - 0.2 K/UL  
 ABS. IMM. GRANS. 0.0 0.0 - 0.5 K/UL GLUCOSE, POC Collection Time: 04/18/19  7:17 AM  
Result Value Ref Range Glucose (POC) 161 (H) 65 - 100 mg/dL GLUCOSE, POC Collection Time: 04/18/19 11:11 AM  
Result Value Ref Range Glucose (POC) 260 (H) 65 - 100 mg/dL All Micro Results Procedure Component Value Units Date/Time CULTURE, URINE [[id number]] Collected:  04/17/19 1842 Order Status:  Completed Specimen:  Urine from Clean catch Updated:  04/18/19 1110 Special Requests: NO SPECIAL REQUESTS Culture result:    
  NO GROWTH AFTER SHORT PERIOD OF INCUBATION. FURTHER RESULTS TO FOLLOW AFTER OVERNIGHT INCUBATION. CULTURE, BLOOD [[id number]] Collected:  04/17/19 2204 Order Status:  Completed Specimen:  Blood Updated:  04/17/19 2246 CULTURE, BLOOD [[id number]] Collected:  04/17/19 2206 Order Status:  Completed Specimen:  Blood Updated:  04/17/19 2246 Current Meds: 
Current Facility-Administered Medications Medication Dose Route Frequency  insulin glargine (LANTUS) injection 16 Units  16 Units SubCUTAneous ACD  cefTRIAXone (ROCEPHIN) 1 g in 0.9% sodium chloride (MBP/ADV) 50 mL  1 g IntraVENous Q24H  
 0.9% sodium chloride infusion  100 mL/hr IntraVENous CONTINUOUS  
 sodium chloride (NS) flush 5-40 mL  5-40 mL IntraVENous Q8H  
 sodium chloride (NS) flush 5-40 mL  5-40 mL IntraVENous PRN  
 acetaminophen (TYLENOL) tablet 650 mg  650 mg Oral Q8H  
 oxyCODONE IR (ROXICODONE) tablet 5-10 mg  5-10 mg Oral Q4H PRN  
 ondansetron (ZOFRAN) injection 4 mg  4 mg IntraVENous Q4H PRN  
 alum-mag hydroxide-simeth (MYLANTA) oral suspension 30 mL  30 mL Oral Q4H PRN  
 naloxone (NARCAN) injection 0.4 mg  0.4 mg IntraVENous PRN  
 metoprolol tartrate (LOPRESSOR) tablet 25 mg  25 mg Oral Q6H PRN  
 albuterol (PROVENTIL VENTOLIN) nebulizer solution 2.5 mg  2.5 mg Nebulization TID RT  
 albuterol (PROVENTIL VENTOLIN) nebulizer solution 2.5 mg  2.5 mg Nebulization Q4H PRN  
 apixaban (ELIQUIS) tablet 5 mg  5 mg Oral BID  venlafaxine-SR (EFFEXOR-XR) capsule 150 mg  150 mg Oral DAILY  donepezil (ARICEPT) tablet 10 mg  10 mg Oral QHS  diphenoxylate-atropine (LOMOTIL) tablet 2 Tab  2 Tab Oral QID PRN  
  traZODone (DESYREL) tablet 100 mg  100 mg Oral QHS  acetaminophen (TYLENOL) tablet 650 mg  650 mg Oral Q4H PRN  
 insulin lispro (HUMALOG) injection   SubCUTAneous AC&HS Diet: DIET DIABETIC CONSISTENT CARB Other Studies (last 24 hours): No results found. Assessment and Plan:  
 
Hospital Problems as of 4/18/2019 Date Reviewed: 4/16/2019 Codes Class Noted - Resolved POA * (Principal) Ankle fracture ICD-10-CM: N39.247P 
ICD-9-CM: 824.8  4/14/2019 - Present Yes Fall ICD-10-CM: W19. Jayce Pavy ICD-9-CM: E888.9  4/14/2019 - Present Yes Hypoxia ICD-10-CM: R09.02 
ICD-9-CM: 799.02  10/2/2017 - Present Yes Overview Signed 10/2/2017  2:28 AM by Alexi Medina MD  
  Probable COPD + obesity Morbid obesity ([facility] (Chronic) ICD-10-CM: E66.01 
ICD-9-CM: 278.01  10/2/2017 - Present Yes Paroxysmal atrial fibrillation (HCC) (Chronic) ICD-10-CM: I48.0 ICD-9-CM: 427.31  6/6/2017 - Present Yes Pacemaker ICD-10-CM: Z95.0 ICD-9-CM: V45.01  1/13/2017 - Present Yes Overview Signed 1/13/2017  6:14 PM by Gladys Keys  
  1. Biotronik MRI Pacemaker - Jan 2017 Atrial flutter ([facility] (Chronic) ICD-10-CM: E63.15 
ICD-9-CM: 427.32  10/21/2016 - Present Overview Signed 12/22/2016  9:25 AM by Gladys Keys 1. Atrial Flutter - Dec 2015 - Ablation of right-sided isthmus-dependent atypical clock-wise atrial flutter. 2. ARACELY Showed [place] Velocity of 25cm/sec Essential hypertension (Chronic) ICD-10-CM: I10 
ICD-9-CM: 401.9  10/21/2016 - Present Yes Chronic bronchitis ([facility] (Chronic) ICD-10-CM: J46 ICD-9-CM: 491.9  4/17/2013 - Present Yes Anxiety ICD-10-CM: F41.9 ICD-9-CM: 300.00  4/17/2013 - Present Yes GERD (gastroesophageal reflux disease) (Chronic) ICD-10-CM: K21.9 ICD-9-CM: 530.81  4/17/2013 - Present Yes Overview Signed 4/17/2013  4:27 PM by Sahil Palma LPN  
  chronic DM type 2 (diabetes mellitus, type 2) (HCC) (Chronic) ICD-10-CM: E11.9 ICD-9-CM: 250.00  4/17/2013 - Present Yes A/P:   
Ankle fracture (4/14/2019)LEFT 
- s/p ORIF 4/16 
- PT/OT, STR PLACEMENT 
  
 COPD/chronic bronchitis-- with post op episode of acute hypoxemic on chronic hypercarbic respiratory failure 
- continue Albuterol prn wheezing 
- Continue to monitor--increased oxygenation needs noted--appears to be improving 
  
  Fall (4/14/2019) - With ankle fracture--s/p orif POD #2 
  
  DM type 2 (diabetes mellitus, type 2) ([facility] (4/17/2013) - Continue Lantus --titrate 11 am fsbs 260- 
- Continue Humalog SSI 
  
  Essential hypertension (10/21/2016)   
  Paroxysmal atrial fibrillation ([address] (6/6/2017) - Eliquis, metoprolol prn 
  
  
  Anxiety (4/17/2013) - Dl [place] 
  
  Pacemaker (1/13/2017)   
  Morbid obesity ([facility] (10/2/2017)   
  
DIET DIABETIC CONSISTENT CARB Regular 
  
DVT Prophylaxis: SCDs (restart Eliquis when ok with Ortho)

## 2019-04-19 LAB
ANION GAP SERPL CALC-SCNC: 5 MMOL/L (ref 7–16)
BASOPHILS # BLD: 0.1 K/UL (ref 0–0.2)
BASOPHILS NFR BLD: 1 % (ref 0–2)
BUN SERPL-MCNC: 18 MG/DL (ref 8–23)
CALCIUM SERPL-MCNC: 9.5 MG/DL (ref 8.3–10.4)
CHLORIDE SERPL-SCNC: 102 MMOL/L (ref 98–107)
CO2 SERPL-SCNC: 34 MMOL/L (ref 21–32)
CREAT SERPL-MCNC: 0.81 MG/DL (ref 0.6–1)
DIFFERENTIAL METHOD BLD: ABNORMAL
EOSINOPHIL # BLD: 0.4 K/UL (ref 0–0.8)
EOSINOPHIL NFR BLD: 7 % (ref 0.5–7.8)
ERYTHROCYTE [DISTWIDTH] IN BLOOD BY AUTOMATED COUNT: 13.7 % (ref 11.9–14.6)
GLUCOSE BLD STRIP.AUTO-MCNC: 144 MG/DL (ref 65–100)
GLUCOSE BLD STRIP.AUTO-MCNC: 172 MG/DL (ref 65–100)
GLUCOSE BLD STRIP.AUTO-MCNC: 215 MG/DL (ref 65–100)
GLUCOSE BLD STRIP.AUTO-MCNC: 252 MG/DL (ref 65–100)
GLUCOSE SERPL-MCNC: 131 MG/DL (ref 65–100)
HCT VFR BLD AUTO: 37 % (ref 35.8–46.3)
HGB BLD-MCNC: 11 G/DL (ref 11.7–15.4)
IMM GRANULOCYTES # BLD AUTO: 0 K/UL (ref 0–0.5)
IMM GRANULOCYTES NFR BLD AUTO: 0 % (ref 0–5)
LYMPHOCYTES # BLD: 1.4 K/UL (ref 0.5–4.6)
LYMPHOCYTES NFR BLD: 25 % (ref 13–44)
MCH RBC QN AUTO: 27.7 PG (ref 26.1–32.9)
MCHC RBC AUTO-ENTMCNC: 29.7 G/DL (ref 31.4–35)
MCV RBC AUTO: 93.2 FL (ref 79.6–97.8)
MONOCYTES # BLD: 0.7 K/UL (ref 0.1–1.3)
MONOCYTES NFR BLD: 12 % (ref 4–12)
NEUTS SEG # BLD: 3.1 K/UL (ref 1.7–8.2)
NEUTS SEG NFR BLD: 55 % (ref 43–78)
NRBC # BLD: 0 K/UL (ref 0–0.2)
PLATELET # BLD AUTO: 189 K/UL (ref 150–450)
PMV BLD AUTO: 10.7 FL (ref 9.4–12.3)
POTASSIUM SERPL-SCNC: 3.6 MMOL/L (ref 3.5–5.1)
RBC # BLD AUTO: 3.97 M/UL (ref 4.05–5.2)
SODIUM SERPL-SCNC: 141 MMOL/L (ref 136–145)
WBC # BLD AUTO: 5.7 K/UL (ref 4.3–11.1)

## 2019-04-19 PROCEDURE — 85025 COMPLETE CBC W/AUTO DIFF WBC: CPT

## 2019-04-19 PROCEDURE — 36415 COLL VENOUS BLD VENIPUNCTURE: CPT

## 2019-04-19 PROCEDURE — 74011000258 HC RX REV CODE- 258: Performed by: INTERNAL MEDICINE

## 2019-04-19 PROCEDURE — 94640 AIRWAY INHALATION TREATMENT: CPT

## 2019-04-19 PROCEDURE — 74011000250 HC RX REV CODE- 250: Performed by: INTERNAL MEDICINE

## 2019-04-19 PROCEDURE — 74011250637 HC RX REV CODE- 250/637: Performed by: FAMILY MEDICINE

## 2019-04-19 PROCEDURE — 74011636637 HC RX REV CODE- 636/637: Performed by: FAMILY MEDICINE

## 2019-04-19 PROCEDURE — 97530 THERAPEUTIC ACTIVITIES: CPT

## 2019-04-19 PROCEDURE — 74011250637 HC RX REV CODE- 250/637: Performed by: INTERNAL MEDICINE

## 2019-04-19 PROCEDURE — 94760 N-INVAS EAR/PLS OXIMETRY 1: CPT

## 2019-04-19 PROCEDURE — 77010033711 HC HIGH FLOW OXYGEN

## 2019-04-19 PROCEDURE — 80048 BASIC METABOLIC PNL TOTAL CA: CPT

## 2019-04-19 PROCEDURE — 74011250636 HC RX REV CODE- 250/636: Performed by: INTERNAL MEDICINE

## 2019-04-19 PROCEDURE — 82962 GLUCOSE BLOOD TEST: CPT

## 2019-04-19 PROCEDURE — 77030020263 HC SOL INJ SOD CL0.9% LFCR 1000ML

## 2019-04-19 PROCEDURE — 74011250637 HC RX REV CODE- 250/637: Performed by: ORTHOPAEDIC SURGERY

## 2019-04-19 PROCEDURE — 65270000029 HC RM PRIVATE

## 2019-04-19 PROCEDURE — 97110 THERAPEUTIC EXERCISES: CPT

## 2019-04-19 PROCEDURE — 77030021668 HC NEB PREFIL KT VYRM -A

## 2019-04-19 PROCEDURE — 74011636637 HC RX REV CODE- 636/637: Performed by: INTERNAL MEDICINE

## 2019-04-19 RX ORDER — POTASSIUM CHLORIDE 20 MEQ/1
40 TABLET, EXTENDED RELEASE ORAL
Status: COMPLETED | OUTPATIENT
Start: 2019-04-19 | End: 2019-04-19

## 2019-04-19 RX ORDER — INSULIN GLARGINE 100 [IU]/ML
26 INJECTION, SOLUTION SUBCUTANEOUS
Status: DISCONTINUED | OUTPATIENT
Start: 2019-04-19 | End: 2019-04-22 | Stop reason: HOSPADM

## 2019-04-19 RX ADMIN — ALBUTEROL SULFATE 2.5 MG: 2.5 SOLUTION RESPIRATORY (INHALATION) at 19:39

## 2019-04-19 RX ADMIN — INSULIN LISPRO 6 UNITS: 100 INJECTION, SOLUTION INTRAVENOUS; SUBCUTANEOUS at 12:15

## 2019-04-19 RX ADMIN — CEFTRIAXONE SODIUM 1 G: 1 INJECTION, POWDER, FOR SOLUTION INTRAMUSCULAR; INTRAVENOUS at 17:22

## 2019-04-19 RX ADMIN — INSULIN LISPRO 4 UNITS: 100 INJECTION, SOLUTION INTRAVENOUS; SUBCUTANEOUS at 16:54

## 2019-04-19 RX ADMIN — VENLAFAXINE HYDROCHLORIDE 150 MG: 75 CAPSULE, EXTENDED RELEASE ORAL at 08:26

## 2019-04-19 RX ADMIN — ACETAMINOPHEN 650 MG: 325 TABLET, FILM COATED ORAL at 13:15

## 2019-04-19 RX ADMIN — ACETAMINOPHEN 650 MG: 325 TABLET, FILM COATED ORAL at 21:52

## 2019-04-19 RX ADMIN — APIXABAN 5 MG: 5 TABLET, FILM COATED ORAL at 08:26

## 2019-04-19 RX ADMIN — POTASSIUM CHLORIDE 40 MEQ: 20 TABLET, EXTENDED RELEASE ORAL at 16:37

## 2019-04-19 RX ADMIN — DONEPEZIL HYDROCHLORIDE 10 MG: 5 TABLET, FILM COATED ORAL at 21:52

## 2019-04-19 RX ADMIN — SODIUM CHLORIDE 50 ML/HR: 900 INJECTION, SOLUTION INTRAVENOUS at 11:35

## 2019-04-19 RX ADMIN — Medication 10 ML: at 21:54

## 2019-04-19 RX ADMIN — ALBUTEROL SULFATE 2.5 MG: 2.5 SOLUTION RESPIRATORY (INHALATION) at 08:34

## 2019-04-19 RX ADMIN — INSULIN GLARGINE 26 UNITS: 100 INJECTION, SOLUTION SUBCUTANEOUS at 16:54

## 2019-04-19 RX ADMIN — ALBUTEROL SULFATE 2.5 MG: 2.5 SOLUTION RESPIRATORY (INHALATION) at 15:00

## 2019-04-19 RX ADMIN — ACETAMINOPHEN 650 MG: 325 TABLET, FILM COATED ORAL at 08:26

## 2019-04-19 RX ADMIN — APIXABAN 5 MG: 5 TABLET, FILM COATED ORAL at 17:22

## 2019-04-19 RX ADMIN — TRAZODONE HYDROCHLORIDE 100 MG: 50 TABLET ORAL at 21:53

## 2019-04-19 RX ADMIN — Medication 5 ML: at 14:00

## 2019-04-19 RX ADMIN — INSULIN LISPRO 2 UNITS: 100 INJECTION, SOLUTION INTRAVENOUS; SUBCUTANEOUS at 21:55

## 2019-04-19 RX ADMIN — Medication 10 ML: at 04:29

## 2019-04-19 NOTE — PROGRESS NOTES
Interdisciplinary team rounds were held 4/19/2019 with the following team members:Care Management, Occupational Therapy, Physician and . Anticipate discharge to [facility] on Monday if medically ready. Plan of care discussed. See clinical pathway and/or care plan for interventions and desired outcomes.

## 2019-04-19 NOTE — PROGRESS NOTES
Problem: Mobility Impaired (Adult and Pediatric) Goal: *Acute Goals and Plan of Care (Insert Text) Description STG: 
(1.)Ms. Poornima Cruz will move from supine to sit and sit to supine , scoot up and down and roll side to side with MINIMAL ASSIST within 3 treatment day(s). (2.)Ms. Poornima Cruz will transfer from bed to chair and chair to bed with MODERATE ASSIST using the least restrictive device within 3 treatment day(s). (3.)Ms. Poornima Cruz will ambulate with MODERATE ASSIST for 2 feet with the least restrictive device within 3 treatment day(s). (4.)Ms. Poornima Cruz will perform standing static and dynamic balance activities x 15 minutes with MODERATE ASSIST to improve safety within 3 day(s). (5.)Ms. Poornima Cruz will perform LE exercises with 3 to 5 cues for form within 3 days to improve strength for functional transfers ambulation. (6.)Ms. Poornima Cruz will maintain stable vital signs throughout all functional mobility within 7 days. LTG: 
(1.)Ms. Poornima Cruz will move from supine to sit and sit to supine , scoot up and down and roll side to side in bed with CONTACT GUARD ASSIST within 7 treatment day(s). (2.)Ms. Poornima Cruz will transfer from bed to chair and chair to bed with MINIMAL ASSIST using the least restrictive device within 7 treatment day(s). (3.)Ms. Poornima Cruz will ambulate with MINIMAL ASSIST for 10 feet with the least restrictive device within 7 treatment day(s). (4.)Ms. Poornima Cruz will perform standing static and dynamic balance activities x 15 minutes with MINIMAL ASSIST to improve safety within 7 day(s). ________________________________________________________________________________________________ Outcome: Progressing Towards Goal 
  
PHYSICAL THERAPY: Daily Note and PM 4/19/2019 INPATIENT: PT Visit Days : 2 [place] / [place] / Product Type: Medicare /   
LLE NWSANGITA  
NAME/AGE/GENDER: Jeffy Hernandez is a 76 y.o. female PRIMARY DIAGNOSIS: Fall [W19. Frankey Cancel Ankle fracture [S82.899A] Ankle fracture Ankle fracture Procedure(s) (LRB): FIBULA OPEN REDUCTION INTERNAL FIXATION (Left) 3 Days Post-Op ICD-10: Treatment Diagnosis: · Difficulty in walking, Not elsewhere classified (R26.2) Precaution/Allergies: 
Aspirin ASSESSMENT:  
Ms. Sergey Noel is 76 y.o. female admitted s/p above surgery. She lives with  in a single story home with a ramp and ambulates limited distances with a rolling walker. Reports her  is unable to assist her due to having cancer. Daughters assists at times. She also admits to 4-5 falls in the past 6 months. Patient was sitting up in recliner chair upon contact and agreeable to PT. Patient participates in therapeutic strengthening exercises to improve functional strength for transfers, gait and overall mobility. Patient requires cues to perform exercises correctly. Instructed patient in sliding board transfer back to bed. Patient able to perform sliding board transfer with mod assist and additional cues for technique during transfer. Patient returns to supine with min assist and cues for technique. Overall slow progress towards physical therapy goals. Patient's goals listed above are still appropriate. Will continue skilled PT to address remaining deficits. This section established at most recent assessment PROBLEM LIST (Impairments causing functional limitations): 1. Decreased Strength 2. Decreased ADL/Functional Activities 3. Decreased Transfer Abilities 4. Decreased Ambulation Ability/Technique 5. Decreased Balance 6. Increased Pain 7. Decreased Activity Tolerance 8. Decreased Knowledge of Precautions 9. Decreased [place] with Home Exercise Program 
10. Decreased Cognition INTERVENTIONS PLANNED: (Benefits and precautions of physical therapy have been discussed with the patient.) 1. Balance Exercise 2. Bed Mobility 3. Family Education 4. Gait Training 5. Group Therapy 6. Home Exercise Program (HEP) 7. Therapeutic Activites 8. Therapeutic Exercise/Strengthening 9. Transfer Training TREATMENT PLAN: Frequency/Duration: twice daily for duration of hospital stay Rehabilitation Potential For Stated Goals: Good REHAB RECOMMENDATIONS (at time of discharge pending progress):   
Placement: It is my opinion, based on this patient's performance to date, that Ms. Sergey Noel may benefit from intensive therapy at a [address] [place] [address] after discharge due to her functional deficits (listed above) that are likely to improve with skilled rehabilitation and concerns that she may be unsafe to be unsupervised at home due to inability to maintain NWB LLE without maximal cues and requiring high level assistance to transfer. Equipment:  
? None at this time HISTORY:  
History of Present Injury/Illness (Reason for Referral): 
Patient is a 65yoF with PMH significant for afib on Eliquis, DM2, who presents after a fall. Patient reportedly rolled out of her bed onto the floor and was wrapped up in bedsheets, so she could not get up on her own. Patient's  could also not get her up, so he called their daughter (who is currently at bedside). She arrived and called EMS. Of note, patient apparently also sustained a fall injury yesterday, injuring her ankle. Pt was found to have an ankle fracture on XR, and her ankle is currently splinted. While in the ER, patient became slightly hypoxic on room air. Improved with supplemental oxygen. She denies over SOB or dyspnea. Denies cough/chest pain. Patient will be admitted for PT and further evaluation/intervention for her ankle fracture which will likely need STR. Past Medical History/Comorbidities: Ms. Sergey Noel  has a past medical history of Anxiety (4/17/2013), Arthritis, Atrial flutter ([address] (10/21/2016), Atrial flutter ([address] Bradycardia (1/12/2017), Cancer ([address] Chronic bronchitis ([address] (4/17/2013), Congenital kidney disease (born with one kidney), COPD (chronic obstructive pulmonary disease) ([address] (4/18/2019), DM type 2 (diabetes mellitus, type 2) ([address] ( 6 yrs), DM type 2 (diabetes mellitus, type 2) ([address] (4/17/2013), DVT (deep venous thrombosis) ([address] Essential hypertension, Essential hypertension (10/21/2016), GERD (gastroesophageal reflux disease) (4/17/2013), GERD (gastroesophageal reflux disease) (4/17/2013), History of non-Hodgkin's lymphoma (12 yrs ago), history of PUD (peptic ulcer disease) (4/17/2013), history of PUD (peptic ulcer disease) (4/17/2013), History of pulmonary embolus (PE) (20+ yrs), History of tobacco abuse (52 yrs), Hyperlipidemia (4/17/2013), Insomnia (4/17/2013), Insomnia (7/34/8982), Metabolic encephalopathy (31/2/3671), Non Hodgkin's lymphoma ([address] (4/17/2013), Obesity (BMI 30-39.9), Pacemaker, Paroxysmal atrial fibrillation ([address] (6/6/2017), Poor historian (05/07/2018), Psychiatric disorder, Pulmonary embolism ([address] and Thyroid disease. She also has no past medical history of Asthma, Autoimmune disease ([address] CAD (coronary artery disease), Chronic kidney disease, Dementia, Difficult intubation, Heart failure ([address] Infectious disease, Liver disease, Malignant hyperthermia due to anesthesia, Nausea & vomiting, Pseudocholinesterase deficiency, Seizures ([address] Sleep apnea, Stroke ([address] or Unspecified adverse effect of anesthesia. Ms. Mariama Valdovinos  has a past surgical history that includes hx syed and bso; hx appendectomy; hx colectomy; hx partial thyroidectomy (5/00); hx fracture tx; hx breast augmentation (40 yrs ago); pr neurological procedure unlisted; hx pacemaker; and hx cataract removal (2010). Social History/Living Environment:  
Home Environment: Private residence # Steps to Enter: 0(ramp) Wheelchair Ramp: Yes One/Two Story Residence: One story Living Alone: No 
Support Systems: Spouse/Significant Other/Partner, Child(tato) Patient Expects to be Discharged to[de-identified] Rehabilitation facility Current DME Used/Available at Home: Commode, bedside, Shower chair, Walker, rolling, Wheelchair Tub or Shower Type: Tub/Shower combination Prior Level of Function/Work/Activity: She lives with  in a single story home with a ramp and ambulates limited distances with a rolling walker. Reports her  is unable to assist her due to having cancer. Daughters assists at times. She also admits to 4-5 falls in the past 6 months. Number of Personal Factors/Comorbidities that affect the Plan of Care: 3+: HIGH COMPLEXITY EXAMINATION:  
Most Recent Physical Functioning:  
Gross Assessment: 
  
         
  
Posture: 
  
Balance: 
Sitting: Impaired Sitting - Static: Good (unsupported) Sitting - Dynamic: Fair (occasional) Standing: Impaired Standing - Static: Fair Standing - Dynamic : Poor Bed Mobility: 
Supine to Sit: Stand-by assistance Sit to Supine: Minimum assistance Wheelchair Mobility: 
  
Transfers: 
Sit to Stand: Moderate assistance Stand to Sit: Moderate assistance Bed to Chair: Moderate assistance Sliding Board : Moderate assistance Gait: 
  
   
  
Body Structures Involved: 1. Nerves 2. Lungs 3. Bones 4. Joints 5. Muscles 6. Ligaments Body Functions Affected: 1. Sensory/Pain 2. Respiratory 3. Neuromusculoskeletal 
4. Movement Related Activities and Participation Affected: 1. Learning and Applying Knowledge 2. Mobility 3. Self Care 4. Domestic Life 5. Interpersonal Interactions and Relationships 6. Community, Social and [place] Number of elements that affect the Plan of Care: 4+: HIGH COMPLEXITY CLINICAL PRESENTATION:  
Presentation: Evolving clinical presentation with changing clinical characteristics: MODERATE COMPLEXITY CLINICAL DECISION MAKIN [facility] [address] [id number] AM-PAC 6 Clicks Basic Mobility Inpatient Short Form How much difficulty does the patient currently have. ..  Unable A Lot A Little None 1. Turning over in bed (including adjusting bedclothes, sheets and blankets)? ? 1   ? 2   ? 3   ? 4  
2. Sitting down on and standing up from a chair with arms ( e.g., wheelchair, bedside commode, etc.)   ? 1   ? 2   ? 3   ? 4  
3. Moving from lying on back to sitting on the side of the bed?   ? 1   ? 2   ? 3   ? 4 How much help from another person does the patient currently need. .. Total A Lot A Little None 4. Moving to and from a bed to a chair (including a wheelchair)? ? 1   ? 2   ? 3   ? 4  
5. Need to walk in hospital room? ? 1   ? 2   ? 3   ? 4  
6. Climbing 3-5 steps with a railing? ? 1   ? 2   ? 3   ? 4  
© 2007, Trustees of [address] [place] [address] [address] [id number], under license to [employer]. All rights reserved Score:  Initial: 10 Most Recent: X (Date: -- ) Interpretation of Tool:  Represents activities that are increasingly more difficult (i.e. Bed mobility, Transfers, Gait). Medical Necessity:    
· Patient demonstrates good ·  rehab potential due to higher previous functional level. Reason for Services/Other Comments: 
· Patient continues to require modification of therapeutic interventions to increase complexity of exercises · . Use of outcome tool(s) and clinical judgement create a POC that gives a: Questionable prediction of patient's progress: MODERATE COMPLEXITY  
  
 
 
 
TREATMENT:  
(In addition to Assessment/Re-Assessment sessions the following treatments were rendered) Pre-treatment Symptoms/Complaints:  None Pain: Initial:  
Pain Intensity 1: 0  Post Session:  0/10 Therapeutic Activity: (    10 minutes): Therapeutic activities including bed mobility training, sliding board transfer training, static/dynamic sitting balance training, scooting, posture training, instruction in NWB status on LLE, and patient education to improve mobility, strength and balance.   Required moderate manual/verbal cues   to promote static and dynamic balance in standing and promote coordination of bilateral, lower extremity(s). Therapeutic Exercise: (  14 Minutes):  Exercises per grid below to improve mobility, strength and balance. Required minimal visual and verbal cues to promote proper body alignment, promote proper body posture and promote proper body mechanics. Progressed range and repetitions as indicated. Date: 
4/19/19 Date: 
 Date: 
  
ACTIVITY/EXERCISE AM PM AM PM AM PM  
Ambulation:           Distance Device Duration Seated Heel Raises 2x15R A Seated Toe Raises 2x15R A Seated Long Arc Quads 2x15B A Seated Marching 2x15B A Seated Hip Abduction 2x15B A       
        
B = bilateral; AA = active assistive; A = active; P = passive Braces/Orthotics/Lines/Etc:  
· IV 
· O2 Device: Heated, Hi flow nasal cannula Treatment/Session Assessment:   
· Response to Treatment:  See above · Interdisciplinary Collaboration:  
o Physical Therapy Assistant 
o Registered Nurse · After treatment position/precautions:  
o Supine in bed 
o Bed alarm/tab alert on 
o Bed/Chair-wheels locked 
o Bed in low position 
o Call light within reach 
o RN notified · Compliance with Program/Exercises: Will assess as treatment progresses · Recommendations/Intent for next treatment session: \"Next visit will focus on advancements to more challenging activities and reduction in assistance provided\". Total Treatment Duration: PT Patient Time In/Time Out Time In: 2432 Time Out: 1329 Linda Garsia PTA

## 2019-04-19 NOTE — PROGRESS NOTES
Hospitalist Progress Note Admit Date:  2019  2:51 AM  
Name:  Roselyn Roche Age:  76 y.o. 
:  1945 MRN:  [id number] PCP:  Carol Andrew MD 
Treatment Team: Attending Provider: Jae Montano DO; Consulting Provider: Wolfgang Mcginnis MD; Consulting Provider: Nestor Cates DO; Utilization Review: Herman Nicholas RN; Consulting Provider: Chiquita Garcia MD; Care Manager: Farhan Guevara; Consulting Provider: Rc Rodriguez MD; Occupational Therapy Assistant: Robel Malcolm; Physical Therapy Assistant: Cem Louis PTA Subjective:  
 
From previous notes: \"67 y.o. Female with PMH Afib on Eliquis, DM II and congenital kidney d(with 1 kidney) was admitted for left ankle fracture after she sustained a fall when she off her bed. Pt is laying in bed with left ankle splint on. Reports she is hurting at her right ribs where she hit it at a night stand when she fell. Right rib pain is worse only when she takes a deep breath. Has some cough, but denies shortness of breath or chest pain. \" 
  
 - post op day #3 orif Left ankle . Much more alert--We discussed avoid klonopin in future. She takes hs for sleep and discussed safer alternatives. Still on high flow nasal cannula oxygen--weaning. Hx of hypoxemia past believed ohs and copd. May need chronic oxygen, but I suspect discont. Klonopin will help. Mild vascular congestion on prior cxr -- will check bnp. She is much improved today.  
  
Review of systems negative except stated above. Objective:  
 
Patient Vitals for the past 24 hrs: 
 Temp Pulse Resp BP SpO2  
19 1130 98.3 °F (36.8 °C) 94 20 111/72 96 % 19 0838     94 % 19 0749 97.6 °F (36.4 °C) 88 20 (!) 155/96 93 % 19 0527     96 % 19 0352 98.1 °F (36.7 °C) 98 20 125/78 98 % 19 2339 98.6 °F (37 °C) 95 20 120/77 95 % 19 98.4 °F (36.9 °C) 92 20 132/84 92 % 04/18/19 1938     91 % 04/18/19 1737 98.4 °F (36.9 °C) 93 20 120/72 95 % 04/18/19 1448 97.5 °F (36.4 °C) 93 18 92/47 94 % Oxygen Therapy O2 Sat (%): 96 % (04/19/19 1130) Pulse via Oximetry: 74 beats per minute (04/19/19 6589) O2 Device: Heated; Hi flow nasal cannula (04/19/19 1567) O2 Flow Rate (L/min): 30 l/min (04/19/19 7340) O2 Temperature: 87.8 °F (31 °C) (04/19/19 0527) FIO2 (%): 30 %(decreased from 40) (04/19/19 1454) Intake/Output Summary (Last 24 hours) at 4/19/2019 1433 Last data filed at 4/18/2019 1737 Gross per 24 hour Intake 930 ml Output  Net 930 ml REVIEW OF SYSTEMS: Comprehensive ROS performed and negative except as stated in HPI. Physical Examination: 
Physical Exam  
Constitutional: No distress. Much more alert HENT:  
Head: Atraumatic. Nose: Nose normal.  
Eyes: Pupils are equal, round, and reactive to light. EOM are normal. No scleral icterus. Neck: No tracheal deviation present. No thyromegaly present. Pulmonary/Chest: No respiratory distress. She has no wheezes. Abdominal: Soft. Bowel sounds are normal.  
Musculoskeletal: She exhibits no tenderness or deformity. Capillary refill toes good Neurological: She is alert. GCS score is 15. Skin: Skin is dry. No rash noted. She is not diaphoretic. Psychiatric: Mood normal.  
Slow mentation improved Data Review: 
I have reviewed all labs, meds, telemetry events, and studies from the last 24 hours. Recent Results (from the past 24 hour(s)) GLUCOSE, POC Collection Time: 04/18/19  4:35 PM  
Result Value Ref Range Glucose (POC) 195 (H) 65 - 100 mg/dL GLUCOSE, POC Collection Time: 04/18/19  8:57 PM  
Result Value Ref Range Glucose (POC) 291 (H) 65 - 100 mg/dL CBC WITH AUTOMATED DIFF Collection Time: 04/19/19  4:13 AM  
Result Value Ref Range WBC 5.7 4.3 - 11.1 K/uL  
 RBC 3.97 (L) 4.05 - 5.2 M/uL  
 HGB 11.0 (L) 11.7 - 15.4 g/dL HCT 37.0 35.8 - 46.3 % MCV 93.2 79.6 - 97.8 FL  
 MCH 27.7 26.1 - 32.9 PG  
 MCHC 29.7 (L) 31.4 - 35.0 g/dL  
 RDW 13.7 11.9 - 14.6 % PLATELET 269 373 - 452 K/uL MPV 10.7 9.4 - 12.3 FL ABSOLUTE NRBC 0.00 0.0 - 0.2 K/uL  
 DF AUTOMATED NEUTROPHILS 55 43 - 78 % LYMPHOCYTES 25 13 - 44 % MONOCYTES 12 4.0 - 12.0 % EOSINOPHILS 7 0.5 - 7.8 % BASOPHILS 1 0.0 - 2.0 % IMMATURE GRANULOCYTES 0 0.0 - 5.0 %  
 ABS. NEUTROPHILS 3.1 1.7 - 8.2 K/UL  
 ABS. LYMPHOCYTES 1.4 0.5 - 4.6 K/UL  
 ABS. MONOCYTES 0.7 0.1 - 1.3 K/UL  
 ABS. EOSINOPHILS 0.4 0.0 - 0.8 K/UL  
 ABS. BASOPHILS 0.1 0.0 - 0.2 K/UL  
 ABS. IMM. GRANS. 0.0 0.0 - 0.5 K/UL METABOLIC PANEL, BASIC Collection Time: 04/19/19  4:13 AM  
Result Value Ref Range Sodium 141 136 - 145 mmol/L Potassium 3.6 3.5 - 5.1 mmol/L Chloride 102 98 - 107 mmol/L  
 CO2 34 (H) 21 - 32 mmol/L Anion gap 5 (L) 7 - 16 mmol/L Glucose 131 (H) 65 - 100 mg/dL BUN 18 8 - 23 MG/DL Creatinine 0.81 0.6 - 1.0 MG/DL  
 GFR est AA >60 >60 ml/min/1.73m2 GFR est non-AA >60 >60 ml/min/1.73m2 Calcium 9.5 8.3 - 10.4 MG/DL  
GLUCOSE, POC Collection Time: 04/19/19  7:51 AM  
Result Value Ref Range Glucose (POC) 144 (H) 65 - 100 mg/dL GLUCOSE, POC Collection Time: 04/19/19 11:29 AM  
Result Value Ref Range Glucose (POC) 252 (H) 65 - 100 mg/dL All Micro Results Procedure Component Value Units Date/Time CULTURE, URINE [[id number]] Collected:  04/17/19 7622 Order Status:  Completed Specimen:  Urine from Clean catch Updated:  04/19/19 9936 Special Requests: NO SPECIAL REQUESTS Culture result: NO GROWTH 1 DAY     
 CULTURE, BLOOD [[id number]] Collected:  04/17/19 2204 Order Status:  Completed Specimen:  Blood Updated:  04/19/19 0740 Special Requests: --     
  RIGHT 
ARM Culture result: NO GROWTH 2 DAYS     
 CULTURE, BLOOD [[id number]] Collected:  04/17/19 2206 Order Status:  Completed Specimen:  Blood Updated:  04/19/19 0740 Special Requests: --     
  RIGHT 
ARM Culture result: NO GROWTH 2 DAYS Current Meds: 
Current Facility-Administered Medications Medication Dose Route Frequency  potassium chloride (K-DUR, KLOR-CON) SR tablet 40 mEq  40 mEq Oral NOW  insulin glargine (LANTUS) injection 20 Units  20 Units SubCUTAneous ACD  cefTRIAXone (ROCEPHIN) 1 g in 0.9% sodium chloride (MBP/ADV) 50 mL  1 g IntraVENous Q24H  
 sodium chloride (NS) flush 5-40 mL  5-40 mL IntraVENous Q8H  
 sodium chloride (NS) flush 5-40 mL  5-40 mL IntraVENous PRN  
 acetaminophen (TYLENOL) tablet 650 mg  650 mg Oral Q8H  
 oxyCODONE IR (ROXICODONE) tablet 5-10 mg  5-10 mg Oral Q4H PRN  
 ondansetron (ZOFRAN) injection 4 mg  4 mg IntraVENous Q4H PRN  
 alum-mag hydroxide-simeth (MYLANTA) oral suspension 30 mL  30 mL Oral Q4H PRN  
 naloxone (NARCAN) injection 0.4 mg  0.4 mg IntraVENous PRN  
 metoprolol tartrate (LOPRESSOR) tablet 25 mg  25 mg Oral Q6H PRN  
 albuterol (PROVENTIL VENTOLIN) nebulizer solution 2.5 mg  2.5 mg Nebulization TID RT  
 albuterol (PROVENTIL VENTOLIN) nebulizer solution 2.5 mg  2.5 mg Nebulization Q4H PRN  
 apixaban (ELIQUIS) tablet 5 mg  5 mg Oral BID  venlafaxine-SR (EFFEXOR-XR) capsule 150 mg  150 mg Oral DAILY  donepezil (ARICEPT) tablet 10 mg  10 mg Oral QHS  diphenoxylate-atropine (LOMOTIL) tablet 2 Tab  2 Tab Oral QID PRN  
 traZODone (DESYREL) tablet 100 mg  100 mg Oral QHS  acetaminophen (TYLENOL) tablet 650 mg  650 mg Oral Q4H PRN  
 insulin lispro (HUMALOG) injection   SubCUTAneous AC&HS Diet: DIET DIABETIC CONSISTENT CARB Other Studies (last 24 hours): No results found. Assessment and Plan:  
 
Hospital Problems as of 4/19/2019 Date Reviewed: 4/16/2019 Codes Class Noted - Resolved POA  
 COPD (chronic obstructive pulmonary disease) ([facility] [place] ICD-10-CM: J44.9 ICD-9-CM: 768  4/18/2019 - Present Unknown Acute hypoxemic respiratory failure ([facility] ICD-10-CM: J96.01 
ICD-9-CM: 518.81  4/18/2019 - Present Unknown Chronic hypercapnic respiratory failure ([facility] ICD-10-CM: O99.46 
ICD-9-CM: 518.83  4/18/2019 - Present Unknown * (Principal) Ankle fracture ICD-10-CM: H83.049T 
ICD-9-CM: 824.8  4/14/2019 - Present Yes Fall ICD-10-CM: W19. Osiris Lanier ICD-9-CM: E888.9  4/14/2019 - Present Yes Hypoxia ICD-10-CM: R09.02 
ICD-9-CM: 799.02  10/2/2017 - Present Yes Overview Signed 10/2/2017  2:28 AM by Marcy Lugo MD  
  Probable COPD + obesity Morbid obesity ([facility] (Chronic) ICD-10-CM: E66.01 
ICD-9-CM: 278.01  10/2/2017 - Present Yes Paroxysmal atrial fibrillation (HCC) (Chronic) ICD-10-CM: I48.0 ICD-9-CM: 427.31  6/6/2017 - Present Yes Pacemaker ICD-10-CM: Z95.0 ICD-9-CM: V45.01  1/13/2017 - Present Yes Overview Signed 1/13/2017  6:14 PM by Orlando Shelton  
  1. [employer]otron[employer] MRI Pacemaker - Jan 2017 Atrial flutter ([facility] (Chronic) ICD-10-CM: E25.63 
ICD-9-CM: 427.32  10/21/2016 - Present Overview Signed 12/22/2016  9:25 AM by Orlando Shelton 1. Atrial Flutter - Dec 2015 - Ablation of right-sided isthmus-dependent atypical clock-wise atrial flutter. 2. ARACELY Showed RENO Velocity of 25cm/sec Essential hypertension (Chronic) ICD-10-CM: I10 
ICD-9-CM: 401.9  10/21/2016 - Present Yes Chronic bronchitis ([facility] (Chronic) ICD-10-CM: H76 ICD-9-CM: 491.9  4/17/2013 - Present Yes Anxiety ICD-10-CM: F41.9 ICD-9-CM: 300.00  4/17/2013 - Present Yes GERD (gastroesophageal reflux disease) (Chronic) ICD-10-CM: K21.9 ICD-9-CM: 530.81  4/17/2013 - Present Yes Overview Signed 4/17/2013  4:27 PM by Paulino Martinez LPN  
  chronic DM type 2 (diabetes mellitus, type 2) (HCC) (Chronic) ICD-10-CM: E11.9 ICD-9-CM: 250.00  4/17/2013 - Present Yes A/P:   
 Ankle fracture (4/14/2019)LEFT 
- s/p ORIF 4/16--POD #3 
- PT/OT, STR PLACEMENT- probably monday 
  
 COPD/chronic bronchitis-- with post op episode of acute hypoxemic on chronic hypercarbic respiratory failure 
- continue Albuterol prn wheezing 
- Continue to monitor--increased oxygenation needs noted--appears to be improving and oxygen being weaned. Hypoventilation secondary to klonopin contributed. 
  
  Fall (4/14/2019) - With ankle fracture--s/p orif POD #3 
  
  DM type 2 (diabetes mellitus, type 2) ([facility] (4/17/2013) - Continue Lantus --titrate as needed- sugars improved - Continue Humalog SSI Dementia Continue home aricept 
  
  Essential hypertension (10/21/2016)   
  Paroxysmal atrial fibrillation ([address] (6/6/2017) - Eliquis, metoprolol prn 
  
  
  Anxiety (4/17/2013) 
-Orvan Dash-- prn restoril  Hs if need only--do not order yet 
  
  Pacemaker (1/13/2017)   
  Morbid obesity ([address] (10/2/2017)   
  
DIET DIABETIC CONSISTENT CARB Regular 
  
DVT Prophylaxis: SCDs eliquis

## 2019-04-19 NOTE — PROGRESS NOTES
Nutrition Assessment for: Length of Stay Assessment: Patient presented w/ left ankle pain after fall. Work up upon admission revealed distal fibular fracture s/p repair on 4/16. PMH remarkable for DM2, Afib, Non-Hodgkins Lymphoma. Patient alert, oriented, and slightly drowsy during visit. Per patient, she has had good appetite prior to and during admission. She reports mild ankle pain, but states that this has not affected her food intake. She denies any N/V/D, difficulty swallowing, or other barriers to intake at home. She reports consumption of 100% of breakfast this AM. Visualized consumption of ~90% of lunch tray. Current Diet Order: [facility] Anthropometrics: 
Height: 5' 11\" (180.3 cm), Weight: 107 kg (236 lb), Unspecified, Body mass index is 32.92 kg/m². BMI class of overweight for age>65. Macronutrient needs: EER: 5617-6796 kcal/day 15-20 kcal/kg CBW (Current body weight) EPR:  g/day 1.2-1.5 g/kg IBW (Ideal body weight) Intake/Comparative Standards: 6 intakes averaging 87% of [facility] diet. This potentially meets 96% of energy needs and 92% of protein needs Nutrition Diagnosis: 
Increased nutrient needs (protein) related to wound healing as evidenced by patient with distal fibular fracture s/p Fibular open reduction internal fixation. . 
Nutrition Intervention: 
Meals and Snacks: Continue current diet. Send eggs at breakfast 
Discharge Plan: Too soon to determine. Mulu Rosenberg, Dietetic Intern

## 2019-04-19 NOTE — PROGRESS NOTES
Problem: Mobility Impaired (Adult and Pediatric) Goal: *Acute Goals and Plan of Care (Insert Text) Description STG: 
(1.)Ms. Raymond Azul will move from supine to sit and sit to supine , scoot up and down and roll side to side with MINIMAL ASSIST within 3 treatment day(s). (2.)Ms. Raymond Azul will transfer from bed to chair and chair to bed with MODERATE ASSIST using the least restrictive device within 3 treatment day(s). (3.)Ms. Raymond Azul will ambulate with MODERATE ASSIST for 2 feet with the least restrictive device within 3 treatment day(s). (4.)Ms. Raymond Azul will perform standing static and dynamic balance activities x 15 minutes with MODERATE ASSIST to improve safety within 3 day(s). (5.)Ms. Raymond Azul will perform LE exercises with 3 to 5 cues for form within 3 days to improve strength for functional transfers ambulation. (6.)Ms. Raymond Azul will maintain stable vital signs throughout all functional mobility within 7 days. LTG: 
(1.)Ms. Raymond Azul will move from supine to sit and sit to supine , scoot up and down and roll side to side in bed with CONTACT GUARD ASSIST within 7 treatment day(s). (2.)Ms. Raymond Azul will transfer from bed to chair and chair to bed with MINIMAL ASSIST using the least restrictive device within 7 treatment day(s). (3.)Ms. Raymond Azul will ambulate with MINIMAL ASSIST for 10 feet with the least restrictive device within 7 treatment day(s). (4.)Ms. Raymond Azul will perform standing static and dynamic balance activities x 15 minutes with MINIMAL ASSIST to improve safety within 7 day(s). ________________________________________________________________________________________________ Outcome: Progressing Towards Goal 
  
PHYSICAL THERAPY: Daily Note and AM 4/19/2019 INPATIENT: PT Visit Days : 2 [place] / Product Type: Medicare /   
LLE B  
NAME/AGE/GENDER: Birdie Richards is a 76 y.o. female PRIMARY DIAGNOSIS: Fall [W19. Yesi Montejo Ankle fracture [S82.899A] Ankle fracture Ankle fracture Procedure(s) (LRB): FIBULA OPEN REDUCTION INTERNAL FIXATION (Left) 3 Days Post-Op ICD-10: Treatment Diagnosis: · Difficulty in walking, Not elsewhere classified (R26.2) Precaution/Allergies: 
Aspirin ASSESSMENT:  
Ms. Gayla Leon is 76 y.o. female admitted s/p above surgery. She lives with  in a single story home with a ramp and ambulates limited distances with a rolling walker. Reports her  is unable to assist her due to having cancer. Daughters assists at times. She also admits to 4-5 falls in the past 6 months. Patient was supine upon contact and agreeable to PT. Patient states she needs to have a bowel movement urgently. Drop arm BSC found for patient. Patient performs supine to sit with SBA and cues for improved/proper technique. Patient performs sliding board transfer to [facility] with mod assist and cues for improved/proper technique. Patient was able to successfully have a bowel movement. Patient then participates in transfer training to standing as she is doing better today. Patient requires verbal and tactile cues to maintain NWB status on LLE during transfer training. Patient transfers to standing with mod assist, rolling walker, and cues for technique. Patient demonstrates fair standing balance. Patient performs SPT to recliner chair with mod assist, rolling walker, and cues for improved/proper technique. Overall slow progress towards physical therapy goals. Patient's goals listed above are still appropriate. Will continue skilled PT to address remaining deficits. This section established at most recent assessment PROBLEM LIST (Impairments causing functional limitations): 1. Decreased Strength 2. Decreased ADL/Functional Activities 3. Decreased Transfer Abilities 4. Decreased Ambulation Ability/Technique 5. Decreased Balance 6. Increased Pain 7. Decreased Activity Tolerance 8. Decreased Knowledge of Precautions 9. Decreased Love with Home Exercise Program 
10. Decreased Cognition INTERVENTIONS PLANNED: (Benefits and precautions of physical therapy have been discussed with the patient.) 1. Balance Exercise 2. Bed Mobility 3. Family Education 4. Gait Training 5. Group Therapy 6. Home Exercise Program (HEP) 7. Therapeutic Activites 8. Therapeutic Exercise/Strengthening 9. Transfer Training TREATMENT PLAN: Frequency/Duration: twice daily for duration of hospital stay Rehabilitation Potential For Stated Goals: Good REHAB RECOMMENDATIONS (at time of discharge pending progress):   
Placement: It is my opinion, based on this patient's performance to date, that Ms. Raymond Azul may benefit from intensive therapy at a [address] [place] [address] after discharge due to her functional deficits (listed above) that are likely to improve with skilled rehabilitation and concerns that she may be unsafe to be unsupervised at home due to inability to maintain NWB LLE without maximal cues and requiring high level assistance to transfer. Equipment:  
? None at this time HISTORY:  
History of Present Injury/Illness (Reason for Referral): 
Patient is a 65yoF with PMH significant for afib on Eliquis, [facility], who presents after a fall. Patient reportedly rolled out of her bed onto the floor and was wrapped up in bedsheets, so she could not get up on her own. Patient's  could also not get her up, so he called their daughter (who is currently at bedside). She arrived and called EMS. Of note, patient apparently also sustained a fall injury yesterday, injuring her ankle. Pt was found to have an ankle fracture on XR, and her ankle is currently splinted. While in the ER, patient became slightly hypoxic on room air. Improved with supplemental oxygen. She denies over SOB or dyspnea. Denies cough/chest pain. Patient will be admitted for PT and further evaluation/intervention for her ankle fracture which will likely need STR. Past Medical History/Comorbidities: Ms. 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), COPD (chronic obstructive pulmonary disease) ([address] (4/18/2019), DM type 2 (diabetes mellitus, type 2) ([address] ( 6 yrs), DM type 2 (diabetes mellitus, type 2) ([address] (4/17/2013), DVT (deep venous thrombosis) ([address] Essential hypertension, Essential hypertension (10/21/2016), GERD (gastroesophageal reflux disease) (4/17/2013), GERD (gastroesophageal reflux disease) (4/17/2013), History of non-Hodgkin's lymphoma (12 yrs ago), history of PUD (peptic ulcer disease) (4/17/2013), history of PUD (peptic ulcer disease) (4/17/2013), History of pulmonary embolus (PE) (20+ yrs), History of tobacco abuse (52 yrs), Hyperlipidemia (4/17/2013), Insomnia (4/17/2013), Insomnia (6/25/1015), Metabolic encephalopathy (05/0/3809), Non Hodgkin's lymphoma ([address] (4/17/2013), Obesity (BMI 30-39.9), Pacemaker, Paroxysmal atrial fibrillation ([address] (6/6/2017), Poor historian (05/07/2018), Psychiatric disorder, Pulmonary embolism ([address] and Thyroid disease. She also has no past medical history of Asthma, Autoimmune disease ([address] CAD (coronary artery disease), Chronic kidney disease, Dementia, Difficult intubation, Heart failure ([address] Infectious disease, Liver disease, Malignant hyperthermia due to anesthesia, Nausea & vomiting, Pseudocholinesterase deficiency, Seizures ([address] Sleep apnea, Stroke ([address] or Unspecified adverse effect of anesthesia.   Ms. Wilder Ramirez  has a past surgical history that includes hx syed and bso; hx appendectomy; hx colectomy; hx partial thyroidectomy (5/00); hx fracture tx; hx breast augmentation (40 yrs ago); pr neurological procedure unlisted; hx pacemaker; and hx cataract removal (2010). Social History/Living Environment:  
Home Environment: Private residence # Steps to Enter: 0(ramp) Wheelchair Ramp: Yes One/Two Story Residence: One story Living Alone: No 
Support Systems: Spouse/Significant Other/Partner, Child(tato) Patient Expects to be Discharged to[de-identified] Rehabilitation facility Current DME Used/Available at Home: Commode, bedside, Shower chair, Walker, rolling, Wheelchair Tub or Shower Type: Tub/Shower combination Prior Level of Function/Work/Activity: She lives with  in a single story home with a ramp and ambulates limited distances with a rolling walker. Reports her  is unable to assist her due to having cancer. Daughters assists at times. She also admits to 4-5 falls in the past 6 months. Number of Personal Factors/Comorbidities that affect the Plan of Care: 3+: HIGH COMPLEXITY EXAMINATION:  
Most Recent Physical Functioning:  
Gross Assessment: 
  
         
  
Posture: 
  
Balance: 
Sitting: Impaired Sitting - Static: Good (unsupported) Sitting - Dynamic: Fair (occasional) Standing: Impaired Standing - Static: Fair Standing - Dynamic : Poor Bed Mobility: 
Supine to Sit: Stand-by assistance Wheelchair Mobility: 
  
Transfers: 
Sit to Stand: Moderate assistance Stand to Sit: Moderate assistance Bed to Chair: Moderate assistance Sliding Board : Moderate assistance Gait: 
  
   
  
Body Structures Involved: 1. Nerves 2. Lungs 3. Bones 4. Joints 5. Muscles 6. Ligaments Body Functions Affected: 1. Sensory/Pain 2. Respiratory 3. Neuromusculoskeletal 
4. Movement Related Activities and Participation Affected: 1. Learning and Applying Knowledge 2. Mobility 3. Self Care 4. Domestic Life 5. Interpersonal Interactions and Relationships 6. Community, Social and [place] Number of elements that affect the Plan of Care: 4+: HIGH COMPLEXITY CLINICAL PRESENTATION:  
Presentation: Evolving clinical presentation with changing clinical characteristics: MODERATE COMPLEXITY CLINICAL DECISION MAKIN[address] [place] [address] AM-PAC 6 Clicks Basic Mobility Inpatient Short Form How much difficulty does the patient currently have. .. Unable A Lot A Little None 1. Turning over in bed (including adjusting bedclothes, sheets and blankets)? ? 1   ? 2   ? 3   ? 4  
2. Sitting down on and standing up from a chair with arms ( e.g., wheelchair, bedside commode, etc.)   ? 1   ? 2   ? 3   ? 4  
3. Moving from lying on back to sitting on the side of the bed?   ? 1   ? 2   ? 3   ? 4 How much help from another person does the patient currently need. .. Total A Lot A Little None 4. Moving to and from a bed to a chair (including a wheelchair)? ? 1   ? 2   ? 3   ? 4  
5. Need to walk in hospital room? ? 1   ? 2   ? 3   ? 4  
6. Climbing 3-5 steps with a railing? ? 1   ? 2   ? 3   ? 4  
© , Trustees of [address] [place] [address], under license to [employer]. All rights reserved Score:  Initial: 10 Most Recent: X (Date: -- ) Interpretation of Tool:  Represents activities that are increasingly more difficult (i.e. Bed mobility, Transfers, Gait). Medical Necessity:    
· Patient demonstrates good ·  rehab potential due to higher previous functional level. Reason for Services/Other Comments: 
· Patient continues to require modification of therapeutic interventions to increase complexity of exercises · . Use of outcome tool(s) and clinical judgement create a POC that gives a: Questionable prediction of patient's progress: MODERATE COMPLEXITY  
  
 
 
 
TREATMENT:  
(In addition to Assessment/Re-Assessment sessions the following treatments were rendered) Pre-treatment Symptoms/Complaints:  None Pain: Initial:  
Pain Intensity 1: 0  Post Session:  010 Therapeutic Activity: (    25 minutes):   Therapeutic activities including bed mobility training, transfer training, sliding board transfer training, static/dynamic sitting/standing balance training, scooting, posture training, instruction in NWB status on LLE, instruction in sequencing with rolling walker, stand pivot transfer training, and patient education to improve mobility, strength and balance. Required moderate manual/verbal cues   to promote static and dynamic balance in standing and promote coordination of bilateral, lower extremity(s). Braces/Orthotics/Lines/Etc:  
· IV 
· O2 Device: Heated, Hi flow nasal cannula Treatment/Session Assessment:   
· Response to Treatment:  See above · Interdisciplinary Collaboration:  
o Physical Therapy Assistant 
o Registered Nurse 
o Rehabilitation Attendant · After treatment position/precautions:  
o Up in chair 
o Bed alarm/tab alert on 
o Bed/Chair-wheels locked 
o Call light within reach 
o RN notified · Compliance with Program/Exercises: Will assess as treatment progresses · Recommendations/Intent for next treatment session: \"Next visit will focus on advancements to more challenging activities and reduction in assistance provided\". Total Treatment Duration: PT Patient Time In/Time Out Time In: 0845 Time Out: 7301 Warren Garsia, PTA

## 2019-04-19 NOTE — PROGRESS NOTES
Problem: Self Care Deficits Care Plan (Adult) Goal: *Acute Goals and Plan of Care (Insert Text) Description 1. Pt will toilet with mod assistance 2. Pt will complete functional transfers for ADLs with mod assistance 3. Pt will maintain LLE NWB status without cues during ADLs and mobility for ADLs 4. Pt will maintain standing balance for ADLs with CGA 5. Pt will demonstrate independence with HEP to promote increased BUE strength and functional use for ADLs and mobility for ADLs 6. Pt will maintain sitting balance for ADLs with SBA 7. Pt will complete bed mobility with min assistance in prep for ADLs Timeframe: 7 days Outcome: Progressing Towards Goal 
  
OCCUPATIONAL THERAPY: Daily Note and AM  
 4/19/2019 INPATIENT: OT Visit Days: 1 [place] / Product Type: Medicare /  
  
NAME/AGE/GENDER: Mita Quintanilla is a 76 y.o. female PRIMARY DIAGNOSIS:  Fall [W19. Monique Holliss Ankle fracture [S82.899A] Ankle fracture Ankle fracture Procedure(s) (LRB): FIBULA OPEN REDUCTION INTERNAL FIXATION (Left) 3 Days Post-Op ICD-10: Treatment Diagnosis: · Repeated Falls (R29.6) · History of falling (Z91.81) Precautions/Allergies: LLE NWB, falls Aspirin ASSESSMENT:  
Ms. Britany James was admitted with L ankle fracture d/t falling out of bed at home, s/p ORIF, LLE NWB. Pt lives with her  and requires assistance from daughter for bathing, dressing, toileting, and for some hygiene. Pt uses a RW for mobility, endorses multiple recent falls. 4/19/2019 Pt presents up in the chair upon arrival. Pt had already completed ADL's with nursing staff, so pt only completed exercises this session; see grid. Pt participated with good effort. Continue OT POC. This section established at most recent assessment PROBLEM LIST (Impairments causing functional limitations): 1. Decreased Strength 2. Decreased ADL/Functional Activities 3. Decreased Transfer Abilities 4. Decreased Balance 5. Decreased Activity Tolerance 6. Increased Fatigue 7. Decreased Knowledge of Precautions 8. Decreased Cognition INTERVENTIONS PLANNED: (Benefits and precautions of occupational therapy have been discussed with the patient.) 1. Activities of daily living training 2. Adaptive equipment training 3. Balance training 4. Cognitive training 5. Therapeutic activity 6. Therapeutic exercise TREATMENT PLAN: Frequency/Duration: Follow patient 3 times/ week to address above goals. Rehabilitation Potential For Stated Goals: Good REHAB RECOMMENDATIONS (at time of discharge pending progress):   
Placement: It is my opinion, based on this patient's performance to date, that Ms. Lupe Huizar may benefit from intensive therapy at [address] after discharge due to her functional deficits (listed above) that are likely to improve with skilled rehabilitation and concerns that she may be unsafe to be unsupervised at home due to inability to complete ADLs/ fall risk. Equipment:  
? None at this time OCCUPATIONAL PROFILE AND HISTORY:  
History of Present Injury/Illness (Reason for Referral): 
See H&P Past Medical History/Comorbidities: Ms. 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), COPD (chronic obstructive pulmonary disease) ([address] (4/18/2019), DM type 2 (diabetes mellitus, type 2) ([address] ( 6 yrs), DM type 2 (diabetes mellitus, type 2) ([address] (4/17/2013), DVT (deep venous thrombosis) ([address] Essential hypertension, Essential hypertension (10/21/2016), GERD (gastroesophageal reflux disease) (4/17/2013), GERD (gastroesophageal reflux disease) (4/17/2013), History of non-Hodgkin's lymphoma (12 yrs ago), history of PUD (peptic ulcer disease) (4/17/2013), history of PUD (peptic ulcer disease) (4/17/2013), History of pulmonary embolus (PE) (20+ yrs), History of tobacco abuse (52 yrs), Hyperlipidemia (4/17/2013), Insomnia (4/17/2013), Insomnia (0/40/7763), Metabolic encephalopathy (87/1/6837), Non Hodgkin's [facility] ([facility] (4/17/2013), Obesity (BMI 30-39.9), Pacemaker, Paroxysmal atrial fibrillation ([address] (6/6/2017), Poor historian (05/07/2018), Psychiatric disorder, Pulmonary embolism ([address] and Thyroid disease. She also has no past medical history of Asthma, Autoimmune disease ([address] CAD (coronary artery disease), Chronic kidney disease, Dementia, Difficult intubation, Heart failure ([address] Infectious disease, Liver disease, Malignant hyperthermia due to anesthesia, Nausea & vomiting, Pseudocholinesterase deficiency, Seizures ([address] Sleep apnea, Stroke ([address] or Unspecified adverse effect of anesthesia. Ms. Orlena Hodgkins  has a past surgical history that includes hx syed and bso; hx appendectomy; hx colectomy; hx partial thyroidectomy (5/00); hx fracture tx; hx breast augmentation (40 yrs ago); pr neurological procedure unlisted; hx pacemaker; and hx cataract removal (2010). Social History/Living Environment:  
Home Environment: Private residence # Steps to Enter: 0(ramp) Wheelchair Ramp: Yes One/Two Story Residence: One story Living Alone: No 
Support Systems: Spouse/Significant Other/Partner, Child(tato) Patient Expects to be Discharged to[de-identified] Rehabilitation facility Current DME Used/Available at Home: Commode, bedside, Shower chair, Walker, rolling, Wheelchair Tub or Shower Type: Tub/Shower combination Prior Level of Function/Work/Activity: 
Daughter assists w/ ADLs, lives w/ , uses RW, frequent falls Number of Personal Factors/Comorbidities that affect the Plan of Care: Expanded review of therapy/medical records (1-2):  MODERATE COMPLEXITY ASSESSMENT OF OCCUPATIONAL PERFORMANCE[de-identified]  
Activities of Daily Living: Basic ADLs (From Assessment) Complex ADLs (From Assessment) Feeding: Setup Oral Facial Hygiene/Grooming: Minimum assistance Bathing: Maximum assistance Upper Body Dressing: Moderate assistance Lower Body Dressing: Total assistance Toileting: Total assistance Instrumental ADL Meal Preparation: Total assistance Homemaking: Total assistance Grooming/Bathing/Dressing Activities of Daily Living Bed/Mat Mobility Supine to Sit: Stand-by assistance Sit to Stand: Moderate assistance Stand to Sit: Moderate assistance Bed to Chair: Moderate assistance Most Recent Physical Functioning:  
Gross Assessment: 
  
         
  
Posture: 
Posture (WDL): Exceptions to [facility] Posture Assessment: Forward head, Rounded shoulders Balance: 
Sitting: Impaired Sitting - Static: Good (unsupported) Sitting - Dynamic: Fair (occasional) Standing: Impaired Standing - Static: Fair Standing - Dynamic : Poor Bed Mobility: 
Supine to Sit: Stand-by assistance Wheelchair Mobility: 
  
Transfers: 
Sit to Stand: Moderate assistance Stand to Sit: Moderate assistance Bed to Chair: Moderate assistance Sliding Board : Moderate assistance Patient Vitals for the past 6 hrs: 
 BP BP Patient Position SpO2 O2 Flow Rate (L/min) Pulse 04/19/19 0527   96 % 45 l/min   
04/19/19 0749 (!) 155/96 At rest 93 %  88  
04/19/19 0838   94 % 30 l/min  Mental Status Neurologic State: Alert, Confused Orientation Level: Oriented to person Cognition: Decreased attention/concentration, Decreased command following Perception: Appears intact Perseveration: No perseveration noted Safety/Judgement: Fall prevention, Decreased insight into deficits Physical Skills Involved: 
1. Balance 2. Activity Tolerance 3. Pain (acute) Cognitive Skills Affected (resulting in the inability to perform in a timely and safe manner): 1. Executive Function 2. Short Term Recall 3. Sustained Attention 4. Divided Attention Psychosocial Skills Affected: 1. Habits/Routines 2. Environmental Adaptation 3. Self-Awareness Number of elements that affect the Plan of Care: 3-5:  MODERATE COMPLEXITY CLINICAL DECISION MAKIN[address] [place] [address] AM-PAC 6 Clicks Daily Activity Inpatient Short Form How much help from another person does the patient currently need. .. Total A Lot A Little None 1. Putting on and taking off regular lower body clothing? ? 1   ? 2   ? 3   ? 4  
2. Bathing (including washing, rinsing, drying)? ? 1   ? 2   ? 3   ? 4  
3. Toileting, which includes using toilet, bedpan or urinal?   ? 1   ? 2   ? 3   ? 4  
4. Putting on and taking off regular upper body clothing? ? 1   ? 2   ? 3   ? 4  
5. Taking care of personal grooming such as brushing teeth? ? 1   ? 2   ? 3   ? 4  
6. Eating meals? ? 1   ? 2   ? 3   ? 4  
© , Trustees of [address] [place] [id number], under license to [employer]. All rights reserved Score:  Initial: 12 Most Recent: X (Date: -- ) Interpretation of Tool:  Represents activities that are increasingly more difficult (i.e. Bed mobility, Transfers, Gait). Medical Necessity:    
· Patient is expected to demonstrate progress in strength, balance and functional technique ·  to increase independence with ADLs · . Reason for Services/Other Comments: 
· Patient continues to require skilled intervention due to decreased ADLs and functional performance from baseline · . Use of outcome tool(s) and clinical judgement create a POC that gives a: LOW COMPLEXITY  
 
 
 
TREATMENT:  
(In addition to Assessment/Re-Assessment sessions the following treatments were rendered) Pre-treatment Symptoms/Complaints:   
Pain: Initial:  
Pain Intensity 1: 0  Post Session:  0 Therapeutic Exercise: (23 minutes):  Exercises per grid below to improve mobility, strength and dynamic movement of arm - bilateral to improve functional bending, lifting and reaching. Required minimal visual, verbal and tactile cues to promote proper body alignment and promote proper body posture. Progressed resistance, range and repetitions as indicated. Date: 
4/19/19 Date: 
 Date: Activity/Exercise Parameters Parameters Parameters Shoulder flexion/extension 3 sets of 20 reps with red theraband Shoulder horizontal add/abb 3 sets of 20 reps with red theraband Punches 3 sets of 20 reps with red theraband Elbow flexion/extension 3 sets of 20 reps with red theraband Tricep extension 3 sets of 20 reps with red theraband Braces/Orthotics/Lines/Etc:  
· IV 
· O2 Device: Heated, Hi flow nasal cannula Treatment/Session Assessment:   
· Response to Treatment:  no adverse reaction · Interdisciplinary Collaboration:  
o Certified Occupational Therapy Assistant 
o Registered Nurse · After treatment position/precautions:  
o Up in chair 
o Bed alarm/tab alert on 
o Bed/Chair-wheels locked 
o Call light within reach 
o RN notified · Compliance with Program/Exercises: Will assess as treatment progresses. · Recommendations/Intent for next treatment session: \"Next visit will focus on advancements to more challenging activities and reduction in assistance provided\". Total Treatment Duration: OT Patient Time In/Time Out Time In: 1020 Time Out: 1043 [place] Estrada

## 2019-04-20 ENCOUNTER — APPOINTMENT (OUTPATIENT)
Dept: GENERAL RADIOLOGY | Age: 74
DRG: 492 | End: 2019-04-20
Attending: INTERNAL MEDICINE
Payer: MEDICARE

## 2019-04-20 LAB
ANION GAP SERPL CALC-SCNC: 6 MMOL/L (ref 7–16)
BACTERIA SPEC CULT: NORMAL
BASOPHILS # BLD: 0.1 K/UL (ref 0–0.2)
BASOPHILS NFR BLD: 1 % (ref 0–2)
BNP SERPL-MCNC: 91 PG/ML
BUN SERPL-MCNC: 19 MG/DL (ref 8–23)
CALCIUM SERPL-MCNC: 9.1 MG/DL (ref 8.3–10.4)
CHLORIDE SERPL-SCNC: 102 MMOL/L (ref 98–107)
CO2 SERPL-SCNC: 32 MMOL/L (ref 21–32)
CREAT SERPL-MCNC: 0.86 MG/DL (ref 0.6–1)
DIFFERENTIAL METHOD BLD: ABNORMAL
EOSINOPHIL # BLD: 0.5 K/UL (ref 0–0.8)
EOSINOPHIL NFR BLD: 7 % (ref 0.5–7.8)
ERYTHROCYTE [DISTWIDTH] IN BLOOD BY AUTOMATED COUNT: 13.9 % (ref 11.9–14.6)
GLUCOSE BLD STRIP.AUTO-MCNC: 156 MG/DL (ref 65–100)
GLUCOSE BLD STRIP.AUTO-MCNC: 197 MG/DL (ref 65–100)
GLUCOSE BLD STRIP.AUTO-MCNC: 213 MG/DL (ref 65–100)
GLUCOSE BLD STRIP.AUTO-MCNC: 262 MG/DL (ref 65–100)
GLUCOSE SERPL-MCNC: 178 MG/DL (ref 65–100)
HCT VFR BLD AUTO: 38.6 % (ref 35.8–46.3)
HGB BLD-MCNC: 11.9 G/DL (ref 11.7–15.4)
IMM GRANULOCYTES # BLD AUTO: 0 K/UL (ref 0–0.5)
IMM GRANULOCYTES NFR BLD AUTO: 0 % (ref 0–5)
LYMPHOCYTES # BLD: 1.6 K/UL (ref 0.5–4.6)
LYMPHOCYTES NFR BLD: 26 % (ref 13–44)
MCH RBC QN AUTO: 28.7 PG (ref 26.1–32.9)
MCHC RBC AUTO-ENTMCNC: 30.8 G/DL (ref 31.4–35)
MCV RBC AUTO: 93.2 FL (ref 79.6–97.8)
MONOCYTES # BLD: 0.5 K/UL (ref 0.1–1.3)
MONOCYTES NFR BLD: 9 % (ref 4–12)
NEUTS SEG # BLD: 3.4 K/UL (ref 1.7–8.2)
NEUTS SEG NFR BLD: 56 % (ref 43–78)
NRBC # BLD: 0 K/UL (ref 0–0.2)
PLATELET # BLD AUTO: 220 K/UL (ref 150–450)
PMV BLD AUTO: 10.8 FL (ref 9.4–12.3)
POTASSIUM SERPL-SCNC: 4 MMOL/L (ref 3.5–5.1)
RBC # BLD AUTO: 4.14 M/UL (ref 4.05–5.2)
SERVICE CMNT-IMP: NORMAL
SODIUM SERPL-SCNC: 140 MMOL/L (ref 136–145)
WBC # BLD AUTO: 6.1 K/UL (ref 4.3–11.1)

## 2019-04-20 PROCEDURE — 94760 N-INVAS EAR/PLS OXIMETRY 1: CPT

## 2019-04-20 PROCEDURE — 97110 THERAPEUTIC EXERCISES: CPT

## 2019-04-20 PROCEDURE — 74011000250 HC RX REV CODE- 250: Performed by: INTERNAL MEDICINE

## 2019-04-20 PROCEDURE — 71046 X-RAY EXAM CHEST 2 VIEWS: CPT

## 2019-04-20 PROCEDURE — 77010033678 HC OXYGEN DAILY

## 2019-04-20 PROCEDURE — 74011250637 HC RX REV CODE- 250/637: Performed by: ORTHOPAEDIC SURGERY

## 2019-04-20 PROCEDURE — 85025 COMPLETE CBC W/AUTO DIFF WBC: CPT

## 2019-04-20 PROCEDURE — 74011250636 HC RX REV CODE- 250/636: Performed by: INTERNAL MEDICINE

## 2019-04-20 PROCEDURE — 65270000029 HC RM PRIVATE

## 2019-04-20 PROCEDURE — 74011000258 HC RX REV CODE- 258: Performed by: INTERNAL MEDICINE

## 2019-04-20 PROCEDURE — 97530 THERAPEUTIC ACTIVITIES: CPT

## 2019-04-20 PROCEDURE — 82962 GLUCOSE BLOOD TEST: CPT

## 2019-04-20 PROCEDURE — 83880 ASSAY OF NATRIURETIC PEPTIDE: CPT

## 2019-04-20 PROCEDURE — 74011636637 HC RX REV CODE- 636/637: Performed by: INTERNAL MEDICINE

## 2019-04-20 PROCEDURE — 74011636637 HC RX REV CODE- 636/637: Performed by: FAMILY MEDICINE

## 2019-04-20 PROCEDURE — 97535 SELF CARE MNGMENT TRAINING: CPT

## 2019-04-20 PROCEDURE — 74011250637 HC RX REV CODE- 250/637: Performed by: FAMILY MEDICINE

## 2019-04-20 PROCEDURE — 36415 COLL VENOUS BLD VENIPUNCTURE: CPT

## 2019-04-20 PROCEDURE — 94640 AIRWAY INHALATION TREATMENT: CPT

## 2019-04-20 PROCEDURE — 80048 BASIC METABOLIC PNL TOTAL CA: CPT

## 2019-04-20 RX ADMIN — ALBUTEROL SULFATE 2.5 MG: 2.5 SOLUTION RESPIRATORY (INHALATION) at 07:28

## 2019-04-20 RX ADMIN — APIXABAN 5 MG: 5 TABLET, FILM COATED ORAL at 09:08

## 2019-04-20 RX ADMIN — VENLAFAXINE HYDROCHLORIDE 150 MG: 75 CAPSULE, EXTENDED RELEASE ORAL at 09:08

## 2019-04-20 RX ADMIN — APIXABAN 5 MG: 5 TABLET, FILM COATED ORAL at 18:00

## 2019-04-20 RX ADMIN — INSULIN LISPRO 2 UNITS: 100 INJECTION, SOLUTION INTRAVENOUS; SUBCUTANEOUS at 11:48

## 2019-04-20 RX ADMIN — Medication 10 ML: at 16:32

## 2019-04-20 RX ADMIN — Medication 10 ML: at 22:06

## 2019-04-20 RX ADMIN — ACETAMINOPHEN 650 MG: 325 TABLET, FILM COATED ORAL at 04:26

## 2019-04-20 RX ADMIN — CEFTRIAXONE SODIUM 1 G: 1 INJECTION, POWDER, FOR SOLUTION INTRAMUSCULAR; INTRAVENOUS at 18:00

## 2019-04-20 RX ADMIN — DONEPEZIL HYDROCHLORIDE 10 MG: 5 TABLET, FILM COATED ORAL at 22:05

## 2019-04-20 RX ADMIN — ACETAMINOPHEN 650 MG: 325 TABLET, FILM COATED ORAL at 22:06

## 2019-04-20 RX ADMIN — Medication 10 ML: at 04:26

## 2019-04-20 RX ADMIN — ACETAMINOPHEN 650 MG: 325 TABLET, FILM COATED ORAL at 11:48

## 2019-04-20 RX ADMIN — INSULIN LISPRO 2 UNITS: 100 INJECTION, SOLUTION INTRAVENOUS; SUBCUTANEOUS at 08:08

## 2019-04-20 RX ADMIN — INSULIN LISPRO 6 UNITS: 100 INJECTION, SOLUTION INTRAVENOUS; SUBCUTANEOUS at 22:03

## 2019-04-20 RX ADMIN — INSULIN LISPRO 4 UNITS: 100 INJECTION, SOLUTION INTRAVENOUS; SUBCUTANEOUS at 17:59

## 2019-04-20 RX ADMIN — ALBUTEROL SULFATE 2.5 MG: 2.5 SOLUTION RESPIRATORY (INHALATION) at 15:15

## 2019-04-20 RX ADMIN — ALBUTEROL SULFATE 2.5 MG: 2.5 SOLUTION RESPIRATORY (INHALATION) at 21:01

## 2019-04-20 RX ADMIN — INSULIN GLARGINE 26 UNITS: 100 INJECTION, SOLUTION SUBCUTANEOUS at 17:58

## 2019-04-20 RX ADMIN — TRAZODONE HYDROCHLORIDE 100 MG: 50 TABLET ORAL at 22:06

## 2019-04-20 NOTE — PROGRESS NOTES
Problem: Mobility Impaired (Adult and Pediatric) Goal: *Acute Goals and Plan of Care (Insert Text) Description STG: 
(1.)Ms. Nestor Dolan will move from supine to sit and sit to supine , scoot up and down and roll side to side with MINIMAL ASSIST within 3 treatment day(s). (2.)Ms. Nestor Dolan will transfer from bed to chair and chair to bed with MODERATE ASSIST using the least restrictive device within 3 treatment day(s). (3.)Ms. Nestor Dolan will ambulate with MODERATE ASSIST for 2 feet with the least restrictive device within 3 treatment day(s). (4.)Ms. Nestor Dolan will perform standing static and dynamic balance activities x 15 minutes with MODERATE ASSIST to improve safety within 3 day(s). (5.)Ms. Nestor Dolan will perform LE exercises with 3 to 5 cues for form within 3 days to improve strength for functional transfers ambulation. (6.)Ms. Nestor Dolan will maintain stable vital signs throughout all functional mobility within 7 days. LTG: 
(1.)Ms. Nestor Dolan will move from supine to sit and sit to supine , scoot up and down and roll side to side in bed with CONTACT GUARD ASSIST within 7 treatment day(s). (2.)Ms. Nestor Dolan will transfer from bed to chair and chair to bed with MINIMAL ASSIST using the least restrictive device within 7 treatment day(s). (3.)Ms. Nestor Dolan will ambulate with MINIMAL ASSIST for 10 feet with the least restrictive device within 7 treatment day(s). (4.)Ms. Nestor Dolan will perform standing static and dynamic balance activities x 15 minutes with MINIMAL ASSIST to improve safety within 7 day(s). ________________________________________________________________________________________________ Outcome: Progressing Towards Goal 
  
PHYSICAL THERAPY: Daily Note and PM 4/20/2019 INPATIENT: PT Visit Days : 3 [place] / [place] B / Product Type: Medicare /   
LLE B  
NAME/AGE/GENDER: Sriram Elizalde is a 76 y.o. female PRIMARY DIAGNOSIS: Fall [W19. [place] Ankle fracture [S82.899A] Ankle fracture Ankle fracture Procedure(s) (LRB): FIBULA OPEN REDUCTION INTERNAL FIXATION (Left) 4 Days Post-Op ICD-10: Treatment Diagnosis: · Difficulty in walking, Not elsewhere classified (R26.2) Precaution/Allergies: 
Aspirin ASSESSMENT:  
Ms. Gayla Leon was supine at arrival and agreeable to PT. She was able to trnf to EOB with SBA and pause to wiggle toes. She required Ai to stand first attempt but fell bwd back onto bed. 2nd trial to stand required Ai and able to balance self in standing. She attempted to hop around to other side of bed and successfully hopped about 2 times, then she struggled to keep foot off floor and presssure off L LE.  PT BTB after session. Pt improved with trnf to sitting, and mobility to chair using RW. Needs constant cues for NWB. This section established at most recent assessment PROBLEM LIST (Impairments causing functional limitations): 1. Decreased Strength 2. Decreased ADL/Functional Activities 3. Decreased Transfer Abilities 4. Decreased Ambulation Ability/Technique 5. Decreased Balance 6. Increased Pain 7. Decreased Activity Tolerance 8. Decreased Knowledge of Precautions 9. Decreased [place] with Home Exercise Program 
10. Decreased Cognition INTERVENTIONS PLANNED: (Benefits and precautions of physical therapy have been discussed with the patient.) 1. Balance Exercise 2. Bed Mobility 3. Family Education 4. Gait Training 5. Group Therapy 6. Home Exercise Program (HEP) 7. Therapeutic Activites 8. Therapeutic Exercise/Strengthening 9. Transfer Training TREATMENT PLAN: Frequency/Duration: twice daily for duration of hospital stay Rehabilitation Potential For Stated Goals: Good REHAB RECOMMENDATIONS (at time of discharge pending progress):   
Placement:  
It is my opinion, based on this patient's performance to date, that Ms. Britany James may benefit from intensive therapy at a [address] [place] [address] after discharge due to her functional deficits (listed above) that are likely to improve with skilled rehabilitation and concerns that she may be unsafe to be unsupervised at home due to inability to maintain NWB LLE without maximal cues and requiring high level assistance to transfer. Equipment:  
? None at this time HISTORY:  
History of Present Injury/Illness (Reason for Referral): 
Patient is a 65yoF with PMH significant for afib on Eliquis, DM2, who presents after a fall. Patient reportedly rolled out of her bed onto the floor and was wrapped up in bedsheets, so she could not get up on her own. Patient's  could also not get her up, so he called their daughter (who is currently at bedside). She arrived and called EMS. Of note, patient apparently also sustained a fall injury yesterday, injuring her ankle. Pt was found to have an ankle fracture on XR, and her ankle is currently splinted. While in the ER, patient became slightly hypoxic on room air. Improved with supplemental oxygen. She denies over SOB or dyspnea. Denies cough/chest pain. Patient will be admitted for PT and further evaluation/intervention for her ankle fracture which will likely need STR. Past Medical History/Comorbidities: Ms. 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), COPD (chronic obstructive pulmonary disease) ([address] (4/18/2019), DM type 2 (diabetes mellitus, type 2) ([address] ( 6 yrs), DM type 2 (diabetes mellitus, type 2) ([address] (4/17/2013), DVT (deep venous thrombosis) ([address] Essential hypertension, Essential hypertension (10/21/2016), GERD (gastroesophageal reflux disease) (4/17/2013), GERD (gastroesophageal reflux disease) (4/17/2013), History of non-Hodgkin's lymphoma (12 yrs ago), history of PUD (peptic ulcer disease) (4/17/2013), history of PUD (peptic ulcer disease) (4/17/2013), History of pulmonary embolus (PE) (20+ yrs), History of tobacco abuse (52 yrs), Hyperlipidemia (4/17/2013), Insomnia (4/17/2013), Insomnia (6/04/7287), Metabolic encephalopathy (06/2/0318), Non Hodgkin's lymphoma ([address] (4/17/2013), Obesity (BMI 30-39.9), Pacemaker, Paroxysmal atrial fibrillation ([address] (6/6/2017), Poor historian (05/07/2018), Psychiatric disorder, Pulmonary embolism ([address] and Thyroid disease. She also has no past medical history of Asthma, Autoimmune disease ([address] CAD (coronary artery disease), Chronic kidney disease, Dementia, Difficult intubation, Heart failure ([address] Infectious disease, Liver disease, Malignant hyperthermia due to anesthesia, Nausea & vomiting, Pseudocholinesterase deficiency, Seizures ([address] Sleep apnea, Stroke ([address] or Unspecified adverse effect of anesthesia. Ms. Farzana Lozano  has a past surgical history that includes hx syed and bso; hx appendectomy; hx colectomy; hx partial thyroidectomy (5/00); hx fracture tx; hx breast augmentation (40 yrs ago); pr neurological procedure unlisted; hx pacemaker; and hx cataract removal (2010). Social History/Living Environment:  
Home Environment: Private residence # Steps to Enter: 0(ramp) Wheelchair Ramp: Yes One/Two Story Residence: One story Living Alone: No 
Support Systems: Spouse/Significant Other/Partner, Child(tato) Patient Expects to be Discharged to[de-identified] Rehabilitation facility Current DME Used/Available at Home: Commode, bedside, Shower chair, Walker, rolling, Wheelchair Tub or Shower Type: Tub/Shower combination Prior Level of Function/Work/Activity: She lives with  in a single story home with a ramp and ambulates limited distances with a rolling walker. Reports her  is unable to assist her due to having cancer. Daughters assists at times.  She also admits to 4-5 falls in the past 6 months. Number of Personal Factors/Comorbidities that affect the Plan of Care: 3+: HIGH COMPLEXITY EXAMINATION:  
Most Recent Physical Functioning:  
Gross Assessment: 
  
         
  
Posture: 
  
Balance: 
Sitting: Impaired Sitting - Static: Good (unsupported) Sitting - Dynamic: Good (unsupported) Standing: Impaired Standing - Static: Fair;Good Standing - Dynamic : Fair;Good Bed Mobility: 
Rolling: Supervision Supine to Sit: Supervision Scooting: Stand-by assistance Wheelchair Mobility: 
  
Transfers: 
Sit to Stand: Minimum assistance Stand to Sit: Minimum assistance Bed to Chair: Minimum assistance Gait: 
  
Base of Support: Shift to right;Center of gravity altered Gait Abnormalities: Shuffling gait Distance (ft): 7 Feet (ft) Assistive Device: Walker, rolling Ambulation - Level of Assistance: Minimal assistance Body Structures Involved: 1. Nerves 2. Lungs 3. Bones 4. Joints 5. Muscles 6. Ligaments Body Functions Affected: 1. Sensory/Pain 2. Respiratory 3. Neuromusculoskeletal 
4. Movement Related Activities and Participation Affected: 1. Learning and Applying Knowledge 2. Mobility 3. Self Care 4. Domestic Life 5. Interpersonal Interactions and Relationships 6. Community, Social and [place] Number of elements that affect the Plan of Care: 4+: HIGH COMPLEXITY CLINICAL PRESENTATION:  
Presentation: Evolving clinical presentation with changing clinical characteristics: MODERATE COMPLEXITY CLINICAL DECISION MAKIN [facility] [address] [address] AM-PAC 6 Clicks Basic Mobility Inpatient Short Form How much difficulty does the patient currently have. .. Unable A Lot A Little None 1. Turning over in bed (including adjusting bedclothes, sheets and blankets)? ? 1   ? 2   ? 3   ? 4  
2. Sitting down on and standing up from a chair with arms ( e.g., wheelchair, bedside commode, etc.)   ?  1   ? 2   ? 3   ? 4  
 3. Moving from lying on back to sitting on the side of the bed?   ? 1   ? 2   ? 3   ? 4 How much help from another person does the patient currently need. .. Total A Lot A Little None 4. Moving to and from a bed to a chair (including a wheelchair)? ? 1   ? 2   ? 3   ? 4  
5. Need to walk in hospital room? ? 1   ? 2   ? 3   ? 4  
6. Climbing 3-5 steps with a railing? ? 1   ? 2   ? 3   ? 4  
© 2007, Trustees of [address] [place] [address] [address] [id number], under license to [employer]. All rights reserved Score:  Initial: 10 Most Recent: X (Date: -- ) Interpretation of Tool:  Represents activities that are increasingly more difficult (i.e. Bed mobility, Transfers, Gait). Medical Necessity:    
· Patient demonstrates good ·  rehab potential due to higher previous functional level. Reason for Services/Other Comments: 
· Patient continues to require modification of therapeutic interventions to increase complexity of exercises · . Use of outcome tool(s) and clinical judgement create a POC that gives a: Questionable prediction of patient's progress: MODERATE COMPLEXITY  
  
 
 
 
TREATMENT:  
(In addition to Assessment/Re-Assessment sessions the following treatments were rendered) Pre-treatment Symptoms/Complaints:  None Pain: Initial:  
Pain Intensity 1: 0 Pain Location 1: Foot Pain Orientation 1: Left  Post Session:  0/10 Therapeutic Activity: (    24 minutes): Therapeutic activities including bed mobility training, sliding board transfer training, static/dynamic sitting balance training, scooting, posture training, instruction in NWB status on LLE, and patient education to improve mobility, strength and balance. Required moderate manual/verbal cues   to promote static and dynamic balance in standing and promote coordination of bilateral, lower extremity(s). Therapeutic Exercise: (  0 Minutes):  Exercises per grid below to improve mobility, strength and balance.   Required minimal visual and verbal cues to promote proper body alignment, promote proper body posture and promote proper body mechanics. Progressed range and repetitions as indicated. Date: 
4/19/19 Date: 
 Date: 
  
ACTIVITY/EXERCISE AM PM AM PM AM PM  
Ambulation:           Distance Device Duration Seated Heel Raises 2x15R A Seated Toe Raises 2x15R A Seated Long Arc Quads 2x15B A Seated Marching 2x15B A Seated Hip Abduction 2x15B A       
        
B = bilateral; AA = active assistive; A = active; P = passive Braces/Orthotics/Lines/Etc:  
· IV 
· O2 Device: Heated, Hi flow nasal cannula Treatment/Session Assessment:   
· Response to Treatment:  See above · Interdisciplinary Collaboration:  
o Physical Therapist 
o Registered Nurse · After treatment position/precautions:  
o Supine in bed 
o Bed alarm/tab alert on 
o Bed/Chair-wheels locked 
o Bed in low position 
o Call light within reach 
o RN notified · Compliance with Program/Exercises: Will assess as treatment progresses · Recommendations/Intent for next treatment session: \"Next visit will focus on advancements to more challenging activities and reduction in assistance provided\". Total Treatment Duration: PT Patient Time In/Time Out Time In: 0881 Time Out: 1510 Sondra Stearns DPT

## 2019-04-20 NOTE — PROGRESS NOTES
Problem: Diabetes Self-Management Goal: *Disease process and treatment process Description Define diabetes and identify own type of diabetes; list 3 options for treating diabetes. Outcome: Progressing Towards Goal 
Goal: *Incorporating nutritional management into lifestyle Description Describe effect of type, amount and timing of food on blood glucose; list 3 methods for planning meals. Outcome: Progressing Towards Goal 
Goal: *Incorporating physical activity into lifestyle Description State effect of exercise on blood glucose levels. Outcome: Progressing Towards Goal 
Goal: *Developing strategies to promote health/change behavior Description Define the ABC's of diabetes; identify appropriate screenings, schedule and personal plan for screenings. Outcome: Progressing Towards Goal 
Goal: *Using medications safely Description State effect of diabetes medications on diabetes; name diabetes medication taking, action and side effects. Outcome: Progressing Towards Goal 
Goal: *Monitoring blood glucose, interpreting and using results Description Identify recommended blood glucose targets  and personal targets. Outcome: Progressing Towards Goal 
Goal: *Prevention, detection, treatment of acute complications Description List symptoms of hyper- and hypoglycemia; describe how to treat low blood sugar and actions for lowering  high blood glucose level. Outcome: Progressing Towards Goal 
Goal: *Prevention, detection and treatment of chronic complications Description Define the natural course of diabetes and describe the relationship of blood glucose levels to long term complications of diabetes. Outcome: Progressing Towards Goal 
Goal: *Developing strategies to address psychosocial issues Description Describe feelings about living with diabetes; identify support needed and support network Outcome: Progressing Towards Goal 
Goal: *Insulin pump training Outcome: Progressing Towards Goal 
 Goal: *Sick day guidelines Outcome: Progressing Towards Goal 
Goal: *Patient Specific Goal (EDIT GOAL, INSERT TEXT) Outcome: Progressing Towards Goal 
  
Problem: Patient Education: Go to Patient Education Activity Goal: Patient/Family Education Outcome: Progressing Towards Goal 
  
Problem: Pressure Injury - Risk of 
Goal: *Prevention of pressure injury Description Document Jatinder Scale and appropriate interventions in the flowsheet. Outcome: Progressing Towards Goal 
  
Problem: Patient Education: Go to Patient Education Activity Goal: Patient/Family Education Outcome: Progressing Towards Goal 
  
Problem: Falls - Risk of 
Goal: *Absence of Falls Description Document Edgar Brunner Fall Risk and appropriate interventions in the flowsheet. Outcome: Progressing Towards Goal 
  
Problem: Patient Education: Go to Patient Education Activity Goal: Patient/Family Education Outcome: Progressing Towards Goal 
  
Problem: Interdisciplinary Rounds Goal: Interdisciplinary Rounds Outcome: Progressing Towards Goal 
  
Problem: Patient Education: Go to Patient Education Activity Goal: Patient/Family Education Outcome: Progressing Towards Goal 
  
Problem: Lower Extremity Fracture:Post-op Day 2 Goal: Off Pathway (Use only if patient is Off Pathway) Outcome: Progressing Towards Goal 
Goal: Activity/Safety Outcome: Progressing Towards Goal 
Goal: Diagnostic Test/Procedures Outcome: Progressing Towards Goal 
Goal: Nutrition/Diet Outcome: Progressing Towards Goal 
Goal: Discharge Planning Outcome: Progressing Towards Goal 
Goal: Medications Outcome: Progressing Towards Goal 
Goal: Respiratory Outcome: Progressing Towards Goal 
Goal: Treatments/Interventions/Procedures Outcome: Progressing Towards Goal 
Goal: Psychosocial 
Outcome: Progressing Towards Goal 
Goal: *Optimal pain control at patient's stated goal 
Outcome: Progressing Towards Goal 
Goal: *Hemodynamically stable Outcome: Progressing Towards Goal 
 Goal: *Adequate oxygenation Outcome: Progressing Towards Goal 
Goal: *PT/INR within defined limits Outcome: Progressing Towards Goal 
Goal: *Demonstrates progressive activity Outcome: Progressing Towards Goal 
Goal: *Voiding Outcome: Progressing Towards Goal 
Goal: *Bowel movement Outcome: Progressing Towards Goal 
Goal: *Tolerating diet Outcome: Progressing Towards Goal 
  
Problem: Lower Extremity Fracture:Post-op Day 3 Goal: Off Pathway (Use only if patient is Off Pathway) Outcome: Progressing Towards Goal 
Goal: Activity/Safety Outcome: Progressing Towards Goal 
Goal: Diagnostic Test/Procedures Outcome: Progressing Towards Goal 
Goal: Nutrition/Diet Outcome: Progressing Towards Goal 
Goal: Discharge Planning Outcome: Progressing Towards Goal 
Goal: Medications Outcome: Progressing Towards Goal 
Goal: Respiratory Outcome: Progressing Towards Goal 
Goal: Treatments/Interventions/Procedures Outcome: Progressing Towards Goal 
Goal: Psychosocial 
Outcome: Progressing Towards Goal 
Goal: *Optimal pain control at patient's stated goal 
Outcome: Progressing Towards Goal 
Goal: *Hemodynamically stable Outcome: Progressing Towards Goal 
Goal: *Adequate oxygenation Outcome: Progressing Towards Goal 
Goal: *PT/INR within defined limits Outcome: Progressing Towards Goal 
Goal: *Demonstrates progressive activity Outcome: Progressing Towards Goal 
Goal: *Voiding Outcome: Progressing Towards Goal 
Goal: *Bowel movement Outcome: Progressing Towards Goal 
Goal: *Tolerating diet Outcome: Progressing Towards Goal 
  
Problem: Lower Extremity Fracture:Post-op Day 4 Goal: Off Pathway (Use only if patient is Off Pathway) Outcome: Progressing Towards Goal 
Goal: Activity/Safety Outcome: Progressing Towards Goal 
Goal: Diagnostic Test/Procedures Outcome: Progressing Towards Goal 
Goal: Nutrition/Diet Outcome: Progressing Towards Goal 
Goal: Discharge Planning Outcome: Progressing Towards Goal 
 Goal: Medications Outcome: Progressing Towards Goal 
Goal: Respiratory Outcome: Progressing Towards Goal 
Goal: Treatments/Interventions/Procedures Outcome: Progressing Towards Goal 
Goal: Psychosocial 
Outcome: Progressing Towards Goal 
  
Problem: Lower Extremity Fracture:Discharge Outcomes Goal: *Hemodynamically stable Outcome: Progressing Towards Goal 
Goal: *Modified independence with transfers, ambulation on levels with assistance devices, stair climbing, ADL's Outcome: Progressing Towards Goal 
Goal: *Independent with active exercises Outcome: Progressing Towards Goal 
Goal: *Describes follow-up/return visits to physicians Outcome: Progressing Towards Goal 
Goal: *Tolerating diet Outcome: Progressing Towards Goal 
Goal: *Optimal pain control at patient's stated goal 
Outcome: Progressing Towards Goal 
Goal: *Adequate air exchange Outcome: Progressing Towards Goal 
Goal: *Lungs clear or at baseline Outcome: Progressing Towards Goal 
Goal: *Afebrile Outcome: Progressing Towards Goal 
Goal: *Incision intact without signs of infection, redness, warmth Outcome: Progressing Towards Goal 
Goal: *Absence of deep venous thrombosis signs and symptoms(Stroke Metric) Outcome: Progressing Towards Goal 
Goal: *Active bowel function Outcome: Progressing Towards Goal 
Goal: *Adequate urinary output Outcome: Progressing Towards Goal 
Goal: *Discharge anxiety minimal 
Outcome: Progressing Towards Goal 
Goal: *Describes available resources and support systems Outcome: Progressing Towards Goal

## 2019-04-20 NOTE — PROGRESS NOTES
Ortho AF,VSS No complaints, mental status cleared up Splint in place and moves toes well Improved mobility with PT, await snf

## 2019-04-20 NOTE — PROGRESS NOTES
Hospitalist Progress Note Admit Date:  2019  2:51 AM  
Name:  Abelardo Gonsalez Age:  76 y.o. 
:  1945 MRN:  [id number] PCP:  Darrius Gomez MD 
Treatment Team: Attending Provider: Myra Rust DO; Consulting Provider: Tracey Solano MD; Consulting Provider: Vivien Harp DO; Utilization Review: Kristina Munoz RN; Consulting Provider: Celina Asencio MD; Care Manager: Murray Araiza; Consulting Provider: Deep Moran MD; Occupational Therapist: Kenia Guerra OT; Physical Therapist: Mary Harrison DPT Subjective:  
 
 
From previous notes: \"67 y.o. Female with PMH Afib on Eliquis, DM II and congenital kidney d(with 1 kidney) was admitted for left ankle fracture after she sustained a fall when she off her bed. Pt is laying in bed with left ankle splint on. Reports she is hurting at her right ribs where she hit it at a night stand when she fell. Right rib pain is worse only when she takes a deep breath. Has some cough, but denies shortness of breath or chest pain. \" 
  
 - post op day #4 orif Left ankle . Much more alert--We discussed stop all klonopin and never restart. Alternate med if need only. Still on oxygen but 2lnc. Former smoker quit 7 yr ago. cxr pending. May need home oxygen before dc to snf. BNP wnl 91. 
  
Review of systems negative except stated above. Objective:  
 
Patient Vitals for the past 24 hrs: 
 Temp Pulse Resp BP SpO2  
19 0814 98.8 °F (37.1 °C) 83 18 125/77 91 % 19 0728     93 % 19 0333 97.4 °F (36.3 °C) 80 20 103/56 94 % 19 0045 98.4 °F (36.9 °C) 98 22 127/65 90 % 19 2053 98.2 °F (36.8 °C) 94 22 126/70 90 % 19 1939     91 % 19 1500     93 % 19 1449 98.1 °F (36.7 °C) 98 22 124/74 93 % Oxygen Therapy O2 Sat (%): 91 % (19) Pulse via Oximetry: 98 beats per minute (19) O2 Device: Nasal cannula (19) O2 Flow Rate (L/min): 2 l/min (04/20/19 0728) O2 Temperature: 87.8 °F (31 °C) (04/19/19 0527) FIO2 (%): 30 %(decreased from 40) (04/19/19 1253) No intake or output data in the 24 hours ending 04/20/19 1144 REVIEW OF SYSTEMS: Comprehensive ROS performed and negative except as stated in HPI. Physical Examination: 
General:    Well nourished. Awake and alert. Head:  Normocephalic, atraumatic Eyes:  Extraocular movements intact, normal sclera CV:   RRR. No  Murmurs, clicks, or gallops Lungs:   Unlabored, no cyanosis Abdomen:   Soft, nondistended, nontender. Extremities: Warm and dry. Left ankle dressing dry and good capillary refill toes Skin:     No rashes or jaundice. Neuro:  No gross focal deficits Psych:  Mood and affect appropriate Data Review: 
I have reviewed all labs, meds, telemetry events, and studies from the last 24 hours. Recent Results (from the past 24 hour(s)) GLUCOSE, POC Collection Time: 04/19/19  3:54 PM  
Result Value Ref Range Glucose (POC) 215 (H) 65 - 100 mg/dL GLUCOSE, POC Collection Time: 04/19/19  9:55 PM  
Result Value Ref Range Glucose (POC) 172 (H) 65 - 100 mg/dL BNP Collection Time: 04/20/19  3:16 AM  
Result Value Ref Range BNP 91 (H) 0 pg/mL METABOLIC PANEL, BASIC Collection Time: 04/20/19  3:16 AM  
Result Value Ref Range Sodium 140 136 - 145 mmol/L Potassium 4.0 3.5 - 5.1 mmol/L Chloride 102 98 - 107 mmol/L  
 CO2 32 21 - 32 mmol/L Anion gap 6 (L) 7 - 16 mmol/L Glucose 178 (H) 65 - 100 mg/dL BUN 19 8 - 23 MG/DL Creatinine 0.86 0.6 - 1.0 MG/DL  
 GFR est AA >60 >60 ml/min/1.73m2 GFR est non-AA >60 >60 ml/min/1.73m2 Calcium 9.1 8.3 - 10.4 MG/DL  
CBC WITH AUTOMATED DIFF Collection Time: 04/20/19  3:16 AM  
Result Value Ref Range WBC 6.1 4.3 - 11.1 K/uL  
 RBC 4.14 4.05 - 5.2 M/uL  
 HGB 11.9 11.7 - 15.4 g/dL HCT 38.6 35.8 - 46.3 %  MCV 93.2 79.6 - 97.8 FL  
 MCH 28.7 26.1 - 32.9 PG  
 MCHC 30.8 (L) 31.4 - 35.0 g/dL  
 RDW 13.9 11.9 - 14.6 % PLATELET 020 951 - 190 K/uL MPV 10.8 9.4 - 12.3 FL ABSOLUTE NRBC 0.00 0.0 - 0.2 K/uL  
 DF AUTOMATED NEUTROPHILS 56 43 - 78 % LYMPHOCYTES 26 13 - 44 % MONOCYTES 9 4.0 - 12.0 % EOSINOPHILS 7 0.5 - 7.8 % BASOPHILS 1 0.0 - 2.0 % IMMATURE GRANULOCYTES 0 0.0 - 5.0 %  
 ABS. NEUTROPHILS 3.4 1.7 - 8.2 K/UL  
 ABS. LYMPHOCYTES 1.6 0.5 - 4.6 K/UL  
 ABS. MONOCYTES 0.5 0.1 - 1.3 K/UL  
 ABS. EOSINOPHILS 0.5 0.0 - 0.8 K/UL  
 ABS. BASOPHILS 0.1 0.0 - 0.2 K/UL  
 ABS. IMM. GRANS. 0.0 0.0 - 0.5 K/UL GLUCOSE, POC Collection Time: 04/20/19  8:02 AM  
Result Value Ref Range Glucose (POC) 156 (H) 65 - 100 mg/dL All Micro Results Procedure Component Value Units Date/Time CULTURE, URINE [[id number]] Collected:  04/17/19 1842 Order Status:  Completed Specimen:  Urine from Clean catch Updated:  04/20/19 6455 Special Requests: NO SPECIAL REQUESTS Culture result: NO GROWTH 2 DAYS     
 CULTURE, BLOOD [[id number]] Collected:  04/17/19 2204 Order Status:  Completed Specimen:  Blood Updated:  04/20/19 8741 Special Requests: --     
  RIGHT 
ARM Culture result: NO GROWTH 3 DAYS     
 CULTURE, BLOOD [[id number]] Collected:  04/17/19 2206 Order Status:  Completed Specimen:  Blood Updated:  04/20/19 3729 Special Requests: --     
  RIGHT 
ARM Culture result: NO GROWTH 3 DAYS Current Meds: 
Current Facility-Administered Medications Medication Dose Route Frequency  insulin glargine (LANTUS) injection 26 Units  26 Units SubCUTAneous ACD  cefTRIAXone (ROCEPHIN) 1 g in 0.9% sodium chloride (MBP/ADV) 50 mL  1 g IntraVENous Q24H  
 sodium chloride (NS) flush 5-40 mL  5-40 mL IntraVENous Q8H  
 sodium chloride (NS) flush 5-40 mL  5-40 mL IntraVENous PRN  
 acetaminophen (TYLENOL) tablet 650 mg  650 mg Oral Q8H  
 oxyCODONE IR (ROXICODONE) tablet 5-10 mg  5-10 mg Oral Q4H PRN  
  ondansetron (ZOFRAN) injection 4 mg  4 mg IntraVENous Q4H PRN  
 alum-mag hydroxide-simeth (MYLANTA) oral suspension 30 mL  30 mL Oral Q4H PRN  
 naloxone (NARCAN) injection 0.4 mg  0.4 mg IntraVENous PRN  
 metoprolol tartrate (LOPRESSOR) tablet 25 mg  25 mg Oral Q6H PRN  
 albuterol (PROVENTIL VENTOLIN) nebulizer solution 2.5 mg  2.5 mg Nebulization TID RT  
 albuterol (PROVENTIL VENTOLIN) nebulizer solution 2.5 mg  2.5 mg Nebulization Q4H PRN  
 apixaban (ELIQUIS) tablet 5 mg  5 mg Oral BID  venlafaxine-SR (EFFEXOR-XR) capsule 150 mg  150 mg Oral DAILY  donepezil (ARICEPT) tablet 10 mg  10 mg Oral QHS  diphenoxylate-atropine (LOMOTIL) tablet 2 Tab  2 Tab Oral QID PRN  
 traZODone (DESYREL) tablet 100 mg  100 mg Oral QHS  acetaminophen (TYLENOL) tablet 650 mg  650 mg Oral Q4H PRN  
 insulin lispro (HUMALOG) injection   SubCUTAneous AC&HS Diet: DIET DIABETIC CONSISTENT CARB Other Studies (last 24 hours): No results found. Assessment and Plan:  
 
Hospital Problems as of 4/20/2019 Date Reviewed: 4/16/2019 Codes Class Noted - Resolved POA  
 COPD (chronic obstructive pulmonary disease) ([facility] ICD-10-CM: J44.9 ICD-9-CM: 371  4/18/2019 - Present Unknown Acute hypoxemic respiratory failure ([facility] ICD-10-CM: J96.01 
ICD-9-CM: 518.81  4/18/2019 - Present Unknown Chronic hypercapnic respiratory failure ([facility] ICD-10-CM: U45.41 
ICD-9-CM: 518.83  4/18/2019 - Present Unknown * (Principal) Ankle fracture ICD-10-CM: N58.837F 
ICD-9-CM: 824.8  4/14/2019 - Present Yes Fall ICD-10-CM: W19. Zackery Mendy ICD-9-CM: E888.9  4/14/2019 - Present Yes Hypoxia ICD-10-CM: R09.02 
ICD-9-CM: 799.02  10/2/2017 - Present Yes Overview Signed 10/2/2017  2:28 AM by Codie Plata MD  
  Probable COPD + obesity Morbid obesity ([facility] (Chronic) ICD-10-CM: E66.01 
ICD-9-CM: 278.01  10/2/2017 - Present Yes Paroxysmal atrial fibrillation (HCC) (Chronic) ICD-10-CM: I48.0 ICD-9-CM: 427.31  6/6/2017 - Present Yes Pacemaker ICD-10-CM: Z95.0 ICD-9-CM: V45.01  1/13/2017 - Present Yes Overview Signed 1/13/2017  6:14 PM by Christiano Orf  
  1. Biotronik MRI Pacemaker - Jan 2017 Atrial flutter ([address] (Chronic) ICD-10-CM: T14.78 
ICD-9-CM: 427.32  10/21/2016 - Present Overview Signed 12/22/2016  9:25 AM by Christiano Orf 1. Atrial Flutter - Dec 2015 - Ablation of right-sided isthmus-dependent atypical clock-wise atrial flutter. 2. ARACELY Showed RENO Velocity of 25cm/sec Essential hypertension (Chronic) ICD-10-CM: I10 
ICD-9-CM: 401.9  10/21/2016 - Present Yes Chronic bronchitis ([address] (Chronic) ICD-10-CM: M59 ICD-9-CM: 491.9  4/17/2013 - Present Yes Anxiety ICD-10-CM: F41.9 ICD-9-CM: 300.00  4/17/2013 - Present Yes GERD (gastroesophageal reflux disease) (Chronic) ICD-10-CM: K21.9 ICD-9-CM: 530.81  4/17/2013 - Present Yes Overview Signed 4/17/2013  4:27 PM by Gorge Vale LPN  
  chronic DM type 2 (diabetes mellitus, type 2) (HCC) (Chronic) ICD-10-CM: E11.9 ICD-9-CM: 250.00  4/17/2013 - Present Yes A/P:   
Ankle fracture (4/14/2019)LEFT 
- s/p ORIF 4/16--POD #4 
- PT/OT, STR PLACEMENT- probably monday 
  
 COPD/chronic bronchitis-- with post op episode of acute hypoxemic on chronic hypercarbic respiratory failure 
- continue Albuterol prn wheezing 
- Continue to monitor--increased oxygenation needs noted--appears to be improving and oxygen being weaned. Hypoventilation secondary to klonopin contributed. --This is unchanged--MAY NEED TO QUALIFY FOR HOME OXYGEN BEFORE DISCHARGE 
 
  Fall (4/14/2019) - With ankle fracture--s/p orif POD #4 
  
  DM type 2 (diabetes mellitus, type 2) ([facility] (4/17/2013) - Continue Lantus --titrate as needed- sugars BETTER--CONTINUE SAME 
- Continue Humalog SSI 
  
  Dementia Continue home aricept--MILD 
  
  Essential hypertension (10/21/2016)   
  Paroxysmal atrial fibrillation ([facility] (6/6/2017) - Eliquis, metoprolol prn 
   
  Anxiety (4/17/2013) 
-Lonny Grise-- prn restoril  Hs if need only--do not order UNLESS NEEDED 
  
  Pacemaker (1/13/2017)   
  Morbid obesity ([facility] (10/2/2017)   
  
DIET DIABETIC CONSISTENT CARB Regular 
  
DVT Prophylaxis: SCDs eliquis

## 2019-04-20 NOTE — PROGRESS NOTES
Patient was in good spirits as she was sitting in chair Receptive to  presence Encouraged her with scripture and words of hope Will follow Thomasina Saint, staff [phone], [phone] [facility] [place]  /   [email or web address]

## 2019-04-20 NOTE — PROGRESS NOTES
Problem: Self Care Deficits Care Plan (Adult) Goal: *Acute Goals and Plan of Care (Insert Text) Description 1. Pt will toilet with mod assistance 2. Pt will complete functional transfers for ADLs with mod assistance 3. Pt will maintain LLE NWB status without cues during ADLs and mobility for ADLs 4. Pt will maintain standing balance for ADLs with CGA 5. Pt will demonstrate independence with HEP to promote increased BUE strength and functional use for ADLs and mobility for ADLs 6. Pt will maintain sitting balance for ADLs with SBA 7. Pt will complete bed mobility with min assistance in prep for ADLs Timeframe: 7 days Outcome: Progressing Towards Goal 
  
OCCUPATIONAL THERAPY: Daily Note and AM  
 4/20/2019 INPATIENT: OT Visit Days: 2 [place] / [place] / Product Type: Medicare /  
  
NAME/AGE/GENDER: Melissa Hannon is a 76 y.o. female PRIMARY DIAGNOSIS:  Fall [W19. Jagjit Cambric Ankle fracture [S82.899A] Ankle fracture Ankle fracture Procedure(s) (LRB): FIBULA OPEN REDUCTION INTERNAL FIXATION (Left) 4 Days Post-Op ICD-10: Treatment Diagnosis: · Repeated Falls (R29.6) · History of falling (Z91.81) Precautions/Allergies: LLE NWB, falls Aspirin ASSESSMENT:  
Ms. Kary Gann was admitted with L ankle fracture d/t falling out of bed at home, s/p ORIF, LLE NWB. Pt lives with her  and requires assistance from daughter for bathing, dressing, toileting, and for some hygiene. Pt uses a RW for mobility, endorses multiple recent falls. 4/20/2019 Pt presents up in the chair upon arrival. Pt states she needs her brief changed and is able to stand from chair with minimum assist and static stands using walker and maintaining NWB LLE. Pt stands x1 min while therapist completes brief change and martina hygiene with total assist. Pt's O2 remains >91% on 2L O2 during and after standing task.  Pt completes BUE therapeutic exercises with good effort while seated in chair. Patient is making progress toward goals and would continue to benefit from short term rehab after discharge. This section established at most recent assessment PROBLEM LIST (Impairments causing functional limitations): 1. Decreased Strength 2. Decreased ADL/Functional Activities 3. Decreased Transfer Abilities 4. Decreased Balance 5. Decreased Activity Tolerance 6. Increased Fatigue 7. Decreased Knowledge of Precautions 8. Decreased Cognition INTERVENTIONS PLANNED: (Benefits and precautions of occupational therapy have been discussed with the patient.) 1. Activities of daily living training 2. Adaptive equipment training 3. Balance training 4. Cognitive training 5. Therapeutic activity 6. Therapeutic exercise TREATMENT PLAN: Frequency/Duration: Follow patient 3 times/ week to address above goals. Rehabilitation Potential For Stated Goals: Good REHAB RECOMMENDATIONS (at time of discharge pending progress):   
Placement: It is my opinion, based on this patient's performance to date, that Ms. Yara Singh may benefit from intensive therapy at a [address] [place] [address] after discharge due to her functional deficits (listed above) that are likely to improve with skilled rehabilitation and concerns that she may be unsafe to be unsupervised at home due to inability to complete ADLs/ fall risk. Equipment:  
? None at this time OCCUPATIONAL PROFILE AND HISTORY:  
History of Present Injury/Illness (Reason for Referral): 
See H&P Past Medical History/Comorbidities: Ms. Yara Singh  has a past medical history of Anxiety (4/17/2013), Arthritis, Atrial flutter ([address] (10/21/2016), Atrial flutter ([address] Bradycardia (1/12/2017), Cancer ([address] Chronic bronchitis ([address] (4/17/2013), Congenital kidney disease (born with one kidney), COPD (chronic obstructive pulmonary disease) ([address] (4/18/2019), DM type 2 (diabetes mellitus, type 2) ([address] ( 6 yrs), DM type 2 (diabetes mellitus, type 2) ([address] (4/17/2013), DVT (deep venous thrombosis) ([address] Essential hypertension, Essential hypertension (10/21/2016), GERD (gastroesophageal reflux disease) (4/17/2013), GERD (gastroesophageal reflux disease) (4/17/2013), History of non-Hodgkin's lymphoma (12 yrs ago), history of PUD (peptic ulcer disease) (4/17/2013), history of PUD (peptic ulcer disease) (4/17/2013), History of pulmonary embolus (PE) (20+ yrs), History of tobacco abuse (52 yrs), Hyperlipidemia (4/17/2013), Insomnia (4/17/2013), Insomnia (8/15/2473), Metabolic encephalopathy (33/3/3140), Non Hodgkin's lymphoma ([address] (4/17/2013), Obesity (BMI 30-39.9), Pacemaker, Paroxysmal atrial fibrillation ([address] (6/6/2017), Poor historian (05/07/2018), Psychiatric disorder, Pulmonary embolism ([address] and Thyroid disease. She also has no past medical history of Asthma, Autoimmune disease ([address] CAD (coronary artery disease), Chronic kidney disease, Dementia, Difficult intubation, Heart failure ([address] Infectious disease, Liver disease, Malignant hyperthermia due to anesthesia, Nausea & vomiting, Pseudocholinesterase deficiency, Seizures ([address] Sleep apnea, Stroke ([address] or Unspecified adverse effect of anesthesia. Ms. Cheng López  has a past surgical history that includes hx syed and bso; hx appendectomy; hx colectomy; hx partial thyroidectomy (5/00); hx fracture tx; hx breast augmentation (40 yrs ago); pr neurological procedure unlisted; hx pacemaker; and hx cataract removal (2010). Social History/Living Environment:  
Home Environment: Private residence # Steps to Enter: 0(ramp) Wheelchair Ramp: Yes One/Two Story Residence: One story Living Alone: No 
Support Systems: Spouse/Significant Other/Partner, Child(tato) Patient Expects to be Discharged to[de-identified] Rehabilitation facility Current DME Used/Available at Home: Commode, bedside, Shower chair, Walker, rolling, Wheelchair Tub or Shower Type: Tub/Shower combination Prior Level of Function/Work/Activity: 
Daughter assists w/ ADLs, lives w/ , uses RW, frequent falls Number of Personal Factors/Comorbidities that affect the Plan of Care: Expanded review of therapy/medical records (1-2):  MODERATE COMPLEXITY ASSESSMENT OF OCCUPATIONAL PERFORMANCE[de-identified]  
Activities of Daily Living:  
Basic ADLs (From Assessment) Complex ADLs (From Assessment) Feeding: Modified independent Oral Facial Hygiene/Grooming: Setup Bathing: Moderate assistance Upper Body Dressing: Minimum assistance Lower Body Dressing: Total assistance Toileting: Total assistance Instrumental ADL Meal Preparation: Total assistance Homemaking: Total assistance Grooming/Bathing/Dressing Activities of Daily Living Bed/Mat Mobility Supine to Sit: Stand-by assistance Sit to Stand: Minimum assistance Stand to Sit: Minimum assistance Bed to Chair: Minimum assistance Scooting: Stand-by assistance Most Recent Physical Functioning:  
Gross Assessment: 
  
         
  
Posture: 
Posture ([facility]): Exceptions to [facility] Posture Assessment: Forward head, Rounded shoulders Balance: 
Sitting: Impaired Sitting - Static: Good (unsupported); Fair (occasional) Sitting - Dynamic: Fair (occasional); Good (unsupported) Standing: Impaired Standing - Static: Fair Standing - Dynamic : Fair Bed Mobility: 
Supine to Sit: Stand-by assistance Scooting: Stand-by assistance Wheelchair Mobility: 
  
Transfers: 
Sit to Stand: Minimum assistance Stand to Sit: Minimum assistance Bed to Chair: Minimum assistance Patient Vitals for the past 6 hrs: 
 BP SpO2 O2 Flow Rate (L/min) Pulse 04/20/19 0728  93 % 2 l/min   
04/20/19 0814 125/77 91 %  83  
04/20/19 1213 (!) 155/93 96 %  92 Mental Status Neurologic State: Alert Orientation Level: Oriented X4 Cognition: Follows commands Perception: Appears intact Perseveration: No perseveration noted Safety/Judgement: Fall prevention, Decreased insight into deficits Physical Skills Involved: 
1. Balance 2. Activity Tolerance 3. Pain (acute) Cognitive Skills Affected (resulting in the inability to perform in a timely and safe manner): 1. Executive Function 2. Short Term Recall 3. Sustained Attention 4. Divided Attention Psychosocial Skills Affected: 1. Habits/Routines 2. Environmental Adaptation 3. Self-Awareness Number of elements that affect the Plan of Care: 3-5:  MODERATE COMPLEXITY CLINICAL DECISION MAKIN[address] [place], [place] [id number] AM-PAC 6 Clicks Daily Activity Inpatient Short Form How much help from another person does the patient currently need. .. Total A Lot A Little None 1. Putting on and taking off regular lower body clothing? ? 1   ? 2   ? 3   ? 4  
2. Bathing (including washing, rinsing, drying)? ? 1   ? 2   ? 3   ? 4  
3. Toileting, which includes using toilet, bedpan or urinal?   ? 1   ? 2   ? 3   ? 4  
4. Putting on and taking off regular upper body clothing? ? 1   ? 2   ? 3   ? 4  
5. Taking care of personal grooming such as brushing teeth? ? 1   ? 2   ? 3   ? 4  
6. Eating meals? ? 1   ? 2   ? 3   ? 4  
© , Trustees of [address] [place] [id number], under license to [employer]. All rights reserved Score:  Initial: 12 Most Recent: X (Date: -- ) Interpretation of Tool:  Represents activities that are increasingly more difficult (i.e. Bed mobility, Transfers, Gait). Medical Necessity:    
· Patient is expected to demonstrate progress in strength, balance and functional technique ·  to increase independence with ADLs · . Reason for Services/Other Comments: 
· Patient continues to require skilled intervention due to decreased ADLs and functional performance from baseline · . Use of outcome tool(s) and clinical judgement create a POC that gives a: LOW COMPLEXITY  
 
 
 
TREATMENT:  
 (In addition to Assessment/Re-Assessment sessions the following treatments were rendered) Pre-treatment Symptoms/Complaints:   
Pain: Initial:  
   Post Session:  0 Self Care: (10): Procedure(s) (per grid) utilized to improve and/or restore self-care/home management as related to dressing and toileting. Required minimal verbal, tactile and   cueing to facilitate activities of daily living skills. Therapeutic Exercise: (23 minutes):  Exercises per grid below to improve mobility, strength and dynamic movement of arm - bilateral to improve functional bending, lifting and reaching. Required minimal visual, verbal and tactile cues to promote proper body alignment and promote proper body posture. Progressed resistance, range and repetitions as indicated. Date: 
4/19/19 Date: 
4/20/19 Date: Activity/Exercise Parameters Parameters Parameters Shoulder flexion/extension 3 sets of 20 reps with red theraband 2x15 sets with red theraband Shoulder horizontal add/abb 3 sets of 20 reps with red theraband 2x15 sets with red theraband Punches 3 sets of 20 reps with red theraband 2x15 sets with red theraband Elbow flexion/extension 3 sets of 20 reps with red theraband 2x15 sets with red theraband Tricep extension 3 sets of 20 reps with red theraband 2x15 sets with red theraband Braces/Orthotics/Lines/Etc:  
· IV 
· O2 Device: Heated, Hi flow nasal cannula Treatment/Session Assessment:   
· Response to Treatment:  no adverse reaction · Interdisciplinary Collaboration:  
o Certified Occupational Therapy Assistant 
o Registered Nurse · After treatment position/precautions:  
o Up in chair 
o Bed alarm/tab alert on 
o Bed/Chair-wheels locked 
o Call light within reach 
o RN notified · Compliance with Program/Exercises: Will assess as treatment progresses. · Recommendations/Intent for next treatment session:   \"Next visit will focus on advancements to more challenging activities and reduction in assistance provided\".  
Total Treatment Duration: 
  
  
Ayleen Balbuena, OT

## 2019-04-20 NOTE — PROGRESS NOTES
Problem: Mobility Impaired (Adult and Pediatric) Goal: *Acute Goals and Plan of Care (Insert Text) Description STG: 
(1.)Ms. Flex Jackson will move from supine to sit and sit to supine , scoot up and down and roll side to side with MINIMAL ASSIST within 3 treatment day(s). (2.)Ms. Flex Jacskon will transfer from bed to chair and chair to bed with MODERATE ASSIST using the least restrictive device within 3 treatment day(s). (3.)Ms. Flex Jackson will ambulate with MODERATE ASSIST for 2 feet with the least restrictive device within 3 treatment day(s). (4.)Ms. Flex Jackson will perform standing static and dynamic balance activities x 15 minutes with MODERATE ASSIST to improve safety within 3 day(s). (5.)Ms. Flex Jackson will perform LE exercises with 3 to 5 cues for form within 3 days to improve strength for functional transfers ambulation. (6.)Ms. Flex Jackson will maintain stable vital signs throughout all functional mobility within 7 days. LTG: 
(1.)Ms. Flex Jackson will move from supine to sit and sit to supine , scoot up and down and roll side to side in bed with CONTACT GUARD ASSIST within 7 treatment day(s). (2.)Ms. Flex Jackson will transfer from bed to chair and chair to bed with MINIMAL ASSIST using the least restrictive device within 7 treatment day(s). (3.)Ms. Flex Jackson will ambulate with MINIMAL ASSIST for 10 feet with the least restrictive device within 7 treatment day(s). (4.)Ms. Flex Jackson will perform standing static and dynamic balance activities x 15 minutes with MINIMAL ASSIST to improve safety within 7 day(s). ________________________________________________________________________________________________ Outcome: Progressing Towards Goal 
  
PHYSICAL THERAPY: Daily Note and AM 4/20/2019 INPATIENT: PT Visit Days : 3 [place] / [place] B / Product Type: Medicare /   
LLE   
NAME/AGE/GENDER: Yamel Connolly is a 76 y.o. female PRIMARY DIAGNOSIS: Fall [W19. Jessica Narendra Ankle fracture [S82.899A] Ankle fracture Ankle fracture Procedure(s) (LRB): FIBULA OPEN REDUCTION INTERNAL FIXATION (Left) 4 Days Post-Op ICD-10: Treatment Diagnosis: · Difficulty in walking, Not elsewhere classified (R26.2) Precaution/Allergies: 
Aspirin ASSESSMENT:  
Ms. Vince Antony is 76 y.o. female admitted s/p above surgery. She lives with  in a single story home with a ramp and ambulates limited distances with a rolling walker. Reports her  is unable to assist her due to having cancer. Daughters assists at times. She also admits to 4-5 falls in the past 6 months. Patient was supine at arrival and agreeable to PT. Reported by RN that she had been standing with NWB. She was able to trnf to EOB with SBA and pause to wiggle toes. She required modA to stand first attempt but fell bwd back onto bed. 2nd trial to stand required Ai and able to balance self in standing. She attempted to hop around to chair but could not get wt off R ft to move it. She was able to shuffle self around to chair with CGA, but Ai to direct self to chair. Left in chair. Pt improved with trnf to sitting, and mobility to chair using RW. This section established at most recent assessment PROBLEM LIST (Impairments causing functional limitations): 1. Decreased Strength 2. Decreased ADL/Functional Activities 3. Decreased Transfer Abilities 4. Decreased Ambulation Ability/Technique 5. Decreased Balance 6. Increased Pain 7. Decreased Activity Tolerance 8. Decreased Knowledge of Precautions 9. Decreased [place] with Home Exercise Program 
10. Decreased Cognition INTERVENTIONS PLANNED: (Benefits and precautions of physical therapy have been discussed with the patient.) 1. Balance Exercise 2. Bed Mobility 3. Family Education 4. Gait Training 5. Group Therapy 6. Home Exercise Program (HEP) 7. Therapeutic Activites 8. Therapeutic Exercise/Strengthening 9. Transfer Training TREATMENT PLAN: Frequency/Duration: twice daily for duration of hospital stay Rehabilitation Potential For Stated Goals: Good REHAB RECOMMENDATIONS (at time of discharge pending progress):   
Placement: It is my opinion, based on this patient's performance to date, that Ms. Angeli Simons may benefit from intensive therapy at a [address] [place] [address] after discharge due to her functional deficits (listed above) that are likely to improve with skilled rehabilitation and concerns that she may be unsafe to be unsupervised at home due to inability to maintain NWB LLE without maximal cues and requiring high level assistance to transfer. Equipment:  
? None at this time HISTORY:  
History of Present Injury/Illness (Reason for Referral): 
Patient is a 65yoF with PMH significant for afib on Eliquis, DM2, who presents after a fall. Patient reportedly rolled out of her bed onto the floor and was wrapped up in bedsheets, so she could not get up on her own. Patient's  could also not get her up, so he called their daughter (who is currently at bedside). She arrived and called EMS. Of note, patient apparently also sustained a fall injury yesterday, injuring her ankle. Pt was found to have an ankle fracture on XR, and her ankle is currently splinted. While in the ER, patient became slightly hypoxic on room air. Improved with supplemental oxygen. She denies over SOB or dyspnea. Denies cough/chest pain. Patient will be admitted for PT and further evaluation/intervention for her ankle fracture which will likely need STR. Past Medical History/Comorbidities: Ms. 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), COPD (chronic obstructive pulmonary disease) ([address] (4/18/2019), DM type 2 (diabetes mellitus, type 2) ([address] ( 6 yrs), DM type 2 (diabetes mellitus, type 2) ([address] (4/17/2013), DVT (deep venous thrombosis) ([address] Essential hypertension, Essential hypertension (10/21/2016), GERD (gastroesophageal reflux disease) (4/17/2013), GERD (gastroesophageal reflux disease) (4/17/2013), History of non-Hodgkin's lymphoma (12 yrs ago), history of PUD (peptic ulcer disease) (4/17/2013), history of PUD (peptic ulcer disease) (4/17/2013), History of pulmonary embolus (PE) (20+ yrs), History of tobacco abuse (52 yrs), Hyperlipidemia (4/17/2013), Insomnia (4/17/2013), Insomnia (6/07/8783), Metabolic encephalopathy (42/3/1719), Non Hodgkin's lymphoma ([address] (4/17/2013), Obesity (BMI 30-39.9), Pacemaker, Paroxysmal atrial fibrillation ([address] (6/6/2017), Poor historian (05/07/2018), Psychiatric disorder, Pulmonary embolism ([address] and Thyroid disease. She also has no past medical history of Asthma, Autoimmune disease ([address] CAD (coronary artery disease), Chronic kidney disease, Dementia, Difficult intubation, Heart failure ([address] Infectious disease, Liver disease, Malignant hyperthermia due to anesthesia, Nausea & vomiting, Pseudocholinesterase deficiency, Seizures ([address] Sleep apnea, Stroke ([address] or Unspecified adverse effect of anesthesia. Ms. Niall Hernadez  has a past surgical history that includes hx syed and bso; hx appendectomy; hx colectomy; hx partial thyroidectomy (5/00); hx fracture tx; hx breast augmentation (40 yrs ago); pr neurological procedure unlisted; hx pacemaker; and hx cataract removal (2010). Social History/Living Environment:  
Home Environment: Private residence # Steps to Enter: 0(ramp) Wheelchair Ramp: Yes One/Two Story Residence: One story Living Alone: No 
Support Systems: Spouse/Significant Other/Partner, Child(tato) Patient Expects to be Discharged to[de-identified] Rehabilitation facility Current DME Used/Available at Home: Commode, bedside, Shower chair, Walker, rolling, Wheelchair Tub or Shower Type: Tub/Shower combination Prior Level of Function/Work/Activity: She lives with  in a single story home with a ramp and ambulates limited distances with a rolling walker. Reports her  is unable to assist her due to having cancer. Daughters assists at times. She also admits to 4-5 falls in the past 6 months. Number of Personal Factors/Comorbidities that affect the Plan of Care: 3+: HIGH COMPLEXITY EXAMINATION:  
Most Recent Physical Functioning:  
Gross Assessment: 
  
         
  
Posture: 
  
Balance: 
Sitting: Impaired Sitting - Static: Good (unsupported); Fair (occasional) Sitting - Dynamic: Fair (occasional); Good (unsupported) Standing: Impaired Standing - Static: Fair Standing - Dynamic : Fair Bed Mobility: 
Supine to Sit: Stand-by assistance Scooting: Stand-by assistance Wheelchair Mobility: 
  
Transfers: 
Sit to Stand: Minimum assistance Stand to Sit: Minimum assistance Bed to Chair: Minimum assistance Gait: 
  
Base of Support: Shift to right;Center of gravity altered Gait Abnormalities: Shuffling gait Distance (ft): 2 Feet (ft) Assistive Device: Walker, rolling Ambulation - Level of Assistance: Minimal assistance Body Structures Involved: 1. Nerves 2. Lungs 3. Bones 4. Joints 5. Muscles 6. Ligaments Body Functions Affected: 1. Sensory/Pain 2. Respiratory 3. Neuromusculoskeletal 
4. Movement Related Activities and Participation Affected: 1. Learning and Applying Knowledge 2. Mobility 3. Self Care 4. Domestic Life 5. Interpersonal Interactions and Relationships 6. Community, Social and [place] Number of elements that affect the Plan of Care: 4+: HIGH COMPLEXITY CLINICAL PRESENTATION:  
Presentation: Evolving clinical presentation with changing clinical characteristics: MODERATE COMPLEXITY CLINICAL DECISION MAKING:  
[employer] AM-PAC 6 Clicks Basic Mobility Inpatient Short Form How much difficulty does the patient currently have. .. Unable A Lot A Little None 1. Turning over in bed (including adjusting bedclothes, sheets and blankets)? ? 1   ? 2   ? 3   ? 4  
2. Sitting down on and standing up from a chair with arms ( e.g., wheelchair, bedside commode, etc.)   ? 1   ? 2   ? 3   ? 4  
3. Moving from lying on back to sitting on the side of the bed?   ? 1   ? 2   ? 3   ? 4 How much help from another person does the patient currently need. .. Total A Lot A Little None 4. Moving to and from a bed to a chair (including a wheelchair)? ? 1   ? 2   ? 3   ? 4  
5. Need to walk in hospital room? ? 1   ? 2   ? 3   ? 4  
6. Climbing 3-5 steps with a railing? ? 1   ? 2   ? 3   ? 4  
© 2007, Trustees of [address] [place], [place] [address], under license to [employer]. All rights reserved Score:  Initial: 10 Most Recent: X (Date: -- ) Interpretation of Tool:  Represents activities that are increasingly more difficult (i.e. Bed mobility, Transfers, Gait). Medical Necessity:    
· Patient demonstrates good ·  rehab potential due to higher previous functional level. Reason for Services/Other Comments: 
· Patient continues to require modification of therapeutic interventions to increase complexity of exercises · . Use of outcome tool(s) and clinical judgement create a POC that gives a: Questionable prediction of patient's progress: MODERATE COMPLEXITY  
  
 
 
 
TREATMENT:  
(In addition to Assessment/Re-Assessment sessions the following treatments were rendered) Pre-treatment Symptoms/Complaints:  None Pain: Initial:  
Pain Intensity 1: 4 Pain Location 1: Foot Pain Orientation 1: Left  Post Session:  0/10 Therapeutic Activity: (    24 minutes):   Therapeutic activities including bed mobility training, sliding board transfer training, static/dynamic sitting balance training, scooting, posture training, instruction in NWB status on LLE, and patient education to improve mobility, strength and balance. Required moderate manual/verbal cues   to promote static and dynamic balance in standing and promote coordination of bilateral, lower extremity(s). Therapeutic Exercise: (  0 Minutes):  Exercises per grid below to improve mobility, strength and balance. Required minimal visual and verbal cues to promote proper body alignment, promote proper body posture and promote proper body mechanics. Progressed range and repetitions as indicated. Date: 
4/19/19 Date: 
 Date: 
  
ACTIVITY/EXERCISE AM PM AM PM AM PM  
Ambulation:           Distance Device Duration Seated Heel Raises 2x15R A Seated Toe Raises 2x15R A Seated Long Arc Quads 2x15B A Seated Marching 2x15B A Seated Hip Abduction 2x15B A       
        
B = bilateral; AA = active assistive; A = active; P = passive Braces/Orthotics/Lines/Etc:  
· IV 
· O2 Device: Heated, Hi flow nasal cannula Treatment/Session Assessment:   
· Response to Treatment:  See above · Interdisciplinary Collaboration:  
o Physical Therapist 
o Registered Nurse · After treatment position/precautions:  
o Up in chair 
o Bed alarm/tab alert on 
o Bed/Chair-wheels locked 
o Bed in low position 
o Call light within reach 
o RN notified · Compliance with Program/Exercises: Will assess as treatment progresses · Recommendations/Intent for next treatment session: \"Next visit will focus on advancements to more challenging activities and reduction in assistance provided\". Total Treatment Duration: PT Patient Time In/Time Out Time In: 0945 Time Out: 1010 Andrew Lacy DPT

## 2019-04-21 LAB
GLUCOSE BLD STRIP.AUTO-MCNC: 152 MG/DL (ref 65–100)
GLUCOSE BLD STRIP.AUTO-MCNC: 217 MG/DL (ref 65–100)
GLUCOSE BLD STRIP.AUTO-MCNC: 219 MG/DL (ref 65–100)
GLUCOSE BLD STRIP.AUTO-MCNC: 232 MG/DL (ref 65–100)

## 2019-04-21 PROCEDURE — 74011636637 HC RX REV CODE- 636/637: Performed by: INTERNAL MEDICINE

## 2019-04-21 PROCEDURE — 74011250636 HC RX REV CODE- 250/636: Performed by: INTERNAL MEDICINE

## 2019-04-21 PROCEDURE — 74011636637 HC RX REV CODE- 636/637: Performed by: FAMILY MEDICINE

## 2019-04-21 PROCEDURE — 82962 GLUCOSE BLOOD TEST: CPT

## 2019-04-21 PROCEDURE — 94760 N-INVAS EAR/PLS OXIMETRY 1: CPT

## 2019-04-21 PROCEDURE — 74011000258 HC RX REV CODE- 258: Performed by: INTERNAL MEDICINE

## 2019-04-21 PROCEDURE — 74011000250 HC RX REV CODE- 250: Performed by: INTERNAL MEDICINE

## 2019-04-21 PROCEDURE — 97530 THERAPEUTIC ACTIVITIES: CPT

## 2019-04-21 PROCEDURE — 94640 AIRWAY INHALATION TREATMENT: CPT

## 2019-04-21 PROCEDURE — 74011250637 HC RX REV CODE- 250/637: Performed by: ORTHOPAEDIC SURGERY

## 2019-04-21 PROCEDURE — 74011250637 HC RX REV CODE- 250/637: Performed by: INTERNAL MEDICINE

## 2019-04-21 PROCEDURE — 74011250637 HC RX REV CODE- 250/637: Performed by: FAMILY MEDICINE

## 2019-04-21 PROCEDURE — 65270000029 HC RM PRIVATE

## 2019-04-21 PROCEDURE — 77010033678 HC OXYGEN DAILY

## 2019-04-21 RX ADMIN — Medication 10 ML: at 05:38

## 2019-04-21 RX ADMIN — ACETAMINOPHEN 650 MG: 325 TABLET, FILM COATED ORAL at 22:52

## 2019-04-21 RX ADMIN — DONEPEZIL HYDROCHLORIDE 10 MG: 5 TABLET, FILM COATED ORAL at 22:52

## 2019-04-21 RX ADMIN — INSULIN LISPRO 2 UNITS: 100 INJECTION, SOLUTION INTRAVENOUS; SUBCUTANEOUS at 08:06

## 2019-04-21 RX ADMIN — ACETAMINOPHEN 650 MG: 325 TABLET, FILM COATED ORAL at 05:37

## 2019-04-21 RX ADMIN — CEFTRIAXONE SODIUM 1 G: 1 INJECTION, POWDER, FOR SOLUTION INTRAMUSCULAR; INTRAVENOUS at 17:00

## 2019-04-21 RX ADMIN — TRAZODONE HYDROCHLORIDE 100 MG: 50 TABLET ORAL at 22:53

## 2019-04-21 RX ADMIN — INSULIN LISPRO 4 UNITS: 100 INJECTION, SOLUTION INTRAVENOUS; SUBCUTANEOUS at 17:00

## 2019-04-21 RX ADMIN — ALBUTEROL SULFATE 2.5 MG: 2.5 SOLUTION RESPIRATORY (INHALATION) at 20:22

## 2019-04-21 RX ADMIN — ALBUTEROL SULFATE 2.5 MG: 2.5 SOLUTION RESPIRATORY (INHALATION) at 14:48

## 2019-04-21 RX ADMIN — INSULIN GLARGINE 26 UNITS: 100 INJECTION, SOLUTION SUBCUTANEOUS at 17:07

## 2019-04-21 RX ADMIN — Medication 10 ML: at 22:54

## 2019-04-21 RX ADMIN — Medication 10 ML: at 14:11

## 2019-04-21 RX ADMIN — VENLAFAXINE HYDROCHLORIDE 150 MG: 75 CAPSULE, EXTENDED RELEASE ORAL at 08:06

## 2019-04-21 RX ADMIN — APIXABAN 5 MG: 5 TABLET, FILM COATED ORAL at 08:06

## 2019-04-21 RX ADMIN — INSULIN LISPRO 4 UNITS: 100 INJECTION, SOLUTION INTRAVENOUS; SUBCUTANEOUS at 22:55

## 2019-04-21 RX ADMIN — ALBUTEROL SULFATE 2.5 MG: 2.5 SOLUTION RESPIRATORY (INHALATION) at 07:31

## 2019-04-21 RX ADMIN — ACETAMINOPHEN 650 MG: 325 TABLET, FILM COATED ORAL at 14:11

## 2019-04-21 RX ADMIN — Medication: at 17:00

## 2019-04-21 RX ADMIN — APIXABAN 5 MG: 5 TABLET, FILM COATED ORAL at 17:00

## 2019-04-21 RX ADMIN — INSULIN LISPRO 4 UNITS: 100 INJECTION, SOLUTION INTRAVENOUS; SUBCUTANEOUS at 12:06

## 2019-04-21 NOTE — PROGRESS NOTES
Hospitalist Progress Note Admit Date:  2019  2:51 AM  
Name:  Yeimy Parent Age:  76 y.o. 
:  1945 MRN:  [id number] PCP:  Ignacio Larsen MD 
Treatment Team: Attending Provider: Brittny Schumacher DO; Consulting Provider: Laura Gr MD; Consulting Provider: Shant Ramirez DO; Utilization Review: Jayashree Metcalf RN; Consulting Provider: Chinedu Zurita MD; Care Manager: Jose Oliveira; Consulting Provider: Kaela Ruiz MD; Physical Therapist: Lillie Kanner, PT, DPT Subjective:  
 
From previous notes: \"67 y.o. Female with PMH Afib on Eliquis, DM II and congenital kidney d(with 1 kidney) was admitted for left ankle fracture after she sustained a fall when she off her bed. Pt is laying in bed with left ankle splint on. Reports she is hurting at her right ribs where she hit it at a night stand when she fell. Right rib pain is worse only when she takes a deep breath. Has some cough, but denies shortness of breath or chest pain. \" 
  
 - post op day #5 orif Left ankle . PLEASANT AND COOPERATIVE--We discussed CONTINUE TO AVOID all klonopin and never restart. Alternate med if need only. Still on oxygen but 2lnc. Former smoker quit 7 yr ago. cxr NO INFILTRATE OR EDEMA. Qualify for home oxygen 
  
Review of systems negative except stated above. Objective:  
 
Patient Vitals for the past 24 hrs: 
 Temp Pulse Resp BP SpO2  
19 0741 98.1 °F (36.7 °C) 78 18 133/78 91 % 19 0731     93 % 19 0512 98.1 °F (36.7 °C) 95 18 130/84 95 % 19 0035 98.5 °F (36.9 °C) 94 20 105/63 90 % 19 2208 98.2 °F (36.8 °C)      
19 2114 97.3 °F (36.3 °C) 96 18 144/70 95 % 19 2101     97 % 19 1559 97.4 °F (36.3 °C) 93 18 114/63 92 % 19 1515     92 % 19 1213 97.4 °F (36.3 °C) 92 18 (!) 155/93 96 % Oxygen Therapy O2 Sat (%): 91 % (19 0741) Pulse via Oximetry: 82 beats per minute (04/21/19 0731) O2 Device: Nasal cannula (04/21/19 0731) O2 Flow Rate (L/min): 2 l/min (04/21/19 0731) O2 Temperature: 87.8 °F (31 °C) (04/19/19 0527) FIO2 (%): 30 %(decreased from 40) (04/19/19 1732) No intake or output data in the 24 hours ending 04/21/19 0942 REVIEW OF SYSTEMS: Comprehensive ROS performed and negative except as stated in HPI. Physical Examination: 
General:    Well nourished. Awake and alert. Head:  Normocephalic, atraumatic Eyes:  Extraocular movements intact, normal sclera CV:   RRR. No  Murmurs, clicks, or gallops Lungs:   Unlabored, no cyanosis-decreased breath sounds bilateral 
Abdomen:   Soft, nondistended, nontender. Extremities: Warm and dry. No cyanosis or edema. Left ankle dressing clean dry Skin:     No rashes or jaundice. Neuro:  No gross focal deficits Psych:  Mood and affect appropriate Data Review: 
I have reviewed all labs, meds, telemetry events, and studies from the last 24 hours. Recent Results (from the past 24 hour(s)) GLUCOSE, POC Collection Time: 04/20/19 12:11 PM  
Result Value Ref Range Glucose (POC) 197 (H) 65 - 100 mg/dL GLUCOSE, POC Collection Time: 04/20/19  3:55 PM  
Result Value Ref Range Glucose (POC) 213 (H) 65 - 100 mg/dL GLUCOSE, POC Collection Time: 04/20/19  9:59 PM  
Result Value Ref Range Glucose (POC) 262 (H) 65 - 100 mg/dL GLUCOSE, POC Collection Time: 04/21/19  7:18 AM  
Result Value Ref Range Glucose (POC) 152 (H) 65 - 100 mg/dL All Micro Results Procedure Component Value Units Date/Time CULTURE, URINE [[id number]] Collected:  04/17/19 1842 Order Status:  Completed Specimen:  Urine from Clean catch Updated:  04/20/19 3064 Special Requests: NO SPECIAL REQUESTS Culture result: NO GROWTH 2 DAYS     
 CULTURE, BLOOD [[id number]] Collected:  04/17/19 220 Order Status:  Completed Specimen:  Blood Updated:  04/20/19 0543 Special Requests: --     
  RIGHT 
ARM Culture result: NO GROWTH 3 DAYS     
 CULTURE, BLOOD [[id number]] Collected:  04/17/19 2206 Order Status:  Completed Specimen:  Blood Updated:  04/20/19 1409 Special Requests: --     
  RIGHT 
ARM Culture result: NO GROWTH 3 DAYS Current Meds: 
Current Facility-Administered Medications Medication Dose Route Frequency  insulin glargine (LANTUS) injection 26 Units  26 Units SubCUTAneous ACD  cefTRIAXone (ROCEPHIN) 1 g in 0.9% sodium chloride (MBP/ADV) 50 mL  1 g IntraVENous Q24H  
 sodium chloride (NS) flush 5-40 mL  5-40 mL IntraVENous Q8H  
 sodium chloride (NS) flush 5-40 mL  5-40 mL IntraVENous PRN  
 acetaminophen (TYLENOL) tablet 650 mg  650 mg Oral Q8H  
 oxyCODONE IR (ROXICODONE) tablet 5-10 mg  5-10 mg Oral Q4H PRN  
 ondansetron (ZOFRAN) injection 4 mg  4 mg IntraVENous Q4H PRN  
 alum-mag hydroxide-simeth (MYLANTA) oral suspension 30 mL  30 mL Oral Q4H PRN  
 naloxone (NARCAN) injection 0.4 mg  0.4 mg IntraVENous PRN  
 metoprolol tartrate (LOPRESSOR) tablet 25 mg  25 mg Oral Q6H PRN  
 albuterol (PROVENTIL VENTOLIN) nebulizer solution 2.5 mg  2.5 mg Nebulization TID RT  
 albuterol (PROVENTIL VENTOLIN) nebulizer solution 2.5 mg  2.5 mg Nebulization Q4H PRN  
 apixaban (ELIQUIS) tablet 5 mg  5 mg Oral BID  venlafaxine-SR (EFFEXOR-XR) capsule 150 mg  150 mg Oral DAILY  donepezil (ARICEPT) tablet 10 mg  10 mg Oral QHS  diphenoxylate-atropine (LOMOTIL) tablet 2 Tab  2 Tab Oral QID PRN  
 traZODone (DESYREL) tablet 100 mg  100 mg Oral QHS  acetaminophen (TYLENOL) tablet 650 mg  650 mg Oral Q4H PRN  
 insulin lispro (HUMALOG) injection   SubCUTAneous AC&HS Diet: DIET DIABETIC CONSISTENT CARB Other Studies (last 24 hours): Xr Chest Pa Lat Result Date: 4/20/2019 CHEST X-RAY, 2 views 4/20/2019 History: Persistent hypoxemia.  Technique: PA and lateral views of the chest. Comparison: Chest x-ray 4/17/2019 Findings: A stable left-sided intracardiac device is seen. The cardiac silhouette is mildly enlarged although stable does not decreased in size from the prior study. The lungs are expanded without evidence for pneumothorax. No consolidation, or evidence of pleural effusion is seen. The bony thorax demonstrates no acute changes. A compression deformity is seen at what appears to be T5 which is felt to be seen on a PA chest dated 4/14/2019 and does not demonstrate clearly acute features. The upper abdomen is unremarkable in appearance. IMPRESSION: 1. Mild cardiomegaly which appears improved from the prior study. Assessment and Plan:  
 
Hospital Problems as of 4/21/2019 Date Reviewed: 4/16/2019 Codes Class Noted - Resolved POA  
 COPD (chronic obstructive pulmonary disease) ([facility] ICD-10-CM: J44.9 ICD-9-CM: 677  4/18/2019 - Present Unknown Acute hypoxemic respiratory failure ([facility] ICD-10-CM: J96.01 
ICD-9-CM: 518.81  4/18/2019 - Present Unknown Chronic hypercapnic respiratory failure ([facility] ICD-10-CM: W81.72 
ICD-9-CM: 518.83  4/18/2019 - Present Unknown * (Principal) Ankle fracture ICD-10-CM: W15.198U 
ICD-9-CM: 824.8  4/14/2019 - Present Yes Fall ICD-10-CM: W19. [address] [place] ICD-9-CM: E888.9  4/14/2019 - Present Yes Hypoxia ICD-10-CM: R09.02 
ICD-9-CM: 799.02  10/2/2017 - Present Yes Overview Signed 10/2/2017  2:28 AM by Selene Candelaria MD  
  Probable COPD + obesity Morbid obesity ([facility] (Chronic) ICD-10-CM: E66.01 
ICD-9-CM: 278.01  10/2/2017 - Present Yes Paroxysmal atrial fibrillation (HCC) (Chronic) ICD-10-CM: I48.0 ICD-9-CM: 427.31  6/6/2017 - Present Yes Pacemaker ICD-10-CM: Z95.0 ICD-9-CM: V45.01  1/13/2017 - Present Yes Overview Signed 1/13/2017  6:14 PM by Yenny Haynes. [employer] MRI Pacemaker - Jan 2017 Atrial flutter ([facility] (Chronic) ICD-10-CM: K41.57 
ICD-9-CM: 427.32  10/21/2016 - Present Overview Signed 12/22/2016  9:25 AM by Salazar Her 1. Atrial Flutter - Dec 2015 - Ablation of right-sided isthmus-dependent atypical clock-wise atrial flutter. 2. ARACELY Showed [place] Velocity of 25cm/sec Essential hypertension (Chronic) ICD-10-CM: I10 
ICD-9-CM: 401.9  10/21/2016 - Present Yes Chronic bronchitis ([facility] (Chronic) ICD-10-CM: H29 ICD-9-CM: 491.9  4/17/2013 - Present Yes Anxiety ICD-10-CM: F41.9 ICD-9-CM: 300.00  4/17/2013 - Present Yes GERD (gastroesophageal reflux disease) (Chronic) ICD-10-CM: K21.9 ICD-9-CM: 530.81  4/17/2013 - Present Yes Overview Signed 4/17/2013  4:27 PM by Kade Queen LPN  
  chronic DM type 2 (diabetes mellitus, type 2) ([facility]) (Chronic) ICD-10-CM: E11.9 ICD-9-CM: 250.00  4/17/2013 - Present Yes A/P:   
Ankle fracture (4/14/2019)LEFT 
- s/p ORIF 4/16--POD #5 
- PT/OT, STR PLACEMENT- tomorrow 
  
 COPD/chronic bronchitis-- with post op episode of acute hypoxemic on chronic hypercarbic respiratory failure 
- continue Albuterol prn wheezing 
- qualify for oxygen for discharge 
  
  Fall (4/14/2019) - With ankle fracture--s/p orif POD #5 
  
  DM type 2 (diabetes mellitus, type 2) ([facility] (4/17/2013) - Continue Lantus --sugars better - Continue Humalog SSI 
  
  Dementia 
            Continue home aricept--MILD 
  
  Essential hypertension (10/21/2016)   
  Paroxysmal atrial fibrillation ([facility] (6/6/2017) - Eliquis, metoprolol prn 
   
  Anxiety (4/17/2013) 
- discontinue KLONOPIN-- prn restoril  Hs if need only--do not order UNLESS NEEDED 
  
  Pacemaker (1/13/2017)   
  Morbid obesity ([facility] (10/2/2017)   
  
DIET DIABETIC CONSISTENT CARB Regular 
  
DVT Prophylaxis: SCDs eliquis

## 2019-04-21 NOTE — PROGRESS NOTES
Problem: Mobility Impaired (Adult and Pediatric) Goal: *Acute Goals and Plan of Care (Insert Text) Description STG: 
(1.)Ms. Pamela Woody will move from supine to sit and sit to supine , scoot up and down and roll side to side with MINIMAL ASSIST within 3 treatment day(s). (2.)Ms. Pamela Woody will transfer from bed to chair and chair to bed with MODERATE ASSIST using the least restrictive device within 3 treatment day(s). (3.)Ms. Pamela Woody will ambulate with MODERATE ASSIST for 2 feet with the least restrictive device within 3 treatment day(s). (4.)Ms. Pamela Woody will perform standing static and dynamic balance activities x 15 minutes with MODERATE ASSIST to improve safety within 3 day(s). (5.)Ms. Pamela Woody will perform LE exercises with 3 to 5 cues for form within 3 days to improve strength for functional transfers ambulation. (6.)Ms. Pamela Woody will maintain stable vital signs throughout all functional mobility within 7 days. LTG: 
(1.)Ms. Pamela Woody will move from supine to sit and sit to supine , scoot up and down and roll side to side in bed with CONTACT GUARD ASSIST within 7 treatment day(s). (2.)Ms. Pamela Woody will transfer from bed to chair and chair to bed with MINIMAL ASSIST using the least restrictive device within 7 treatment day(s). (3.)Ms. Pamela Woody will ambulate with MINIMAL ASSIST for 10 feet with the least restrictive device within 7 treatment day(s). (4.)Ms. Pamela Woody will perform standing static and dynamic balance activities x 15 minutes with MINIMAL ASSIST to improve safety within 7 day(s). ________________________________________________________________________________________________ Outcome: Progressing Towards Goal 
  
PHYSICAL THERAPY: Daily Note and AM 4/21/2019 INPATIENT: PT Visit Days : 4 [place] / [place] A [place] B / Product Type: Medicare /   
LLE B  
NAME/AGE/GENDER: Gary Leonard is a 76 y.o. female PRIMARY DIAGNOSIS: Fall [W19. Jaylan Sutherland Ankle fracture [S82.899A] Ankle fracture Ankle fracture Procedure(s) (LRB): FIBULA OPEN REDUCTION INTERNAL FIXATION (Left) 5 Days Post-Op ICD-10: Treatment Diagnosis: · Difficulty in walking, Not elsewhere classified (R26.2) Precaution/Allergies: 
Aspirin ASSESSMENT:  
Ms. Flex Jackson was supine at arrival and agreeable to PT. She was able to trnf to EOB with SBA/CGA. Pt educated to sway before performing sit to stand transferring and successfully transferred with Ai. She attempted to hop around to other side of bed and successfully hopped about 2 times, then she struggled to keep foot off floor and presssure off L LE. Pt displayed improved shuffling with R LE but had difficulty keeping L LE off ground. Pt improved with trnf to sitting, and mobility to chair using RW. Needs constant cues for NWB. This section established at most recent assessment PROBLEM LIST (Impairments causing functional limitations): 1. Decreased Strength 2. Decreased ADL/Functional Activities 3. Decreased Transfer Abilities 4. Decreased Ambulation Ability/Technique 5. Decreased Balance 6. Increased Pain 7. Decreased Activity Tolerance 8. Decreased Knowledge of Precautions 9. Decreased [place] with Home Exercise Program 
10. Decreased Cognition INTERVENTIONS PLANNED: (Benefits and precautions of physical therapy have been discussed with the patient.) 1. Balance Exercise 2. Bed Mobility 3. Family Education 4. Gait Training 5. Group Therapy 6. Home Exercise Program (HEP) 7. Therapeutic Activites 8. Therapeutic Exercise/Strengthening 9. Transfer Training TREATMENT PLAN: Frequency/Duration: twice daily for duration of hospital stay Rehabilitation Potential For Stated Goals: Good REHAB RECOMMENDATIONS (at time of discharge pending progress):   
Placement:  
It is my opinion, based on this patient's performance to date, that Ms. Flex Jackson may benefit from intensive therapy at a [address] [place] [address] after discharge due to her functional deficits (listed above) that are likely to improve with skilled rehabilitation and concerns that she may be unsafe to be unsupervised at home due to inability to maintain NWB LLE without maximal cues and requiring high level assistance to transfer. Equipment:  
? None at this time HISTORY:  
History of Present Injury/Illness (Reason for Referral): 
Patient is a 65yoF with PMH significant for afib on Eliquis, DM2, who presents after a fall. Patient reportedly rolled out of her bed onto the floor and was wrapped up in bedsheets, so she could not get up on her own. Patient's  could also not get her up, so he called their daughter (who is currently at bedside). She arrived and called EMS. Of note, patient apparently also sustained a fall injury yesterday, injuring her ankle. Pt was found to have an ankle fracture on XR, and her ankle is currently splinted. While in the ER, patient became slightly hypoxic on room air. Improved with supplemental oxygen. She denies over SOB or dyspnea. Denies cough/chest pain. Patient will be admitted for PT and further evaluation/intervention for her ankle fracture which will likely need STR. Past Medical History/Comorbidities: Ms. 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), COPD (chronic obstructive pulmonary disease) ([address] (4/18/2019), DM type 2 (diabetes mellitus, type 2) ([address] ( 6 yrs), DM type 2 (diabetes mellitus, type 2) ([address] (4/17/2013), DVT (deep venous thrombosis) ([address] Essential hypertension, Essential hypertension (10/21/2016), GERD (gastroesophageal reflux disease) (4/17/2013), GERD (gastroesophageal reflux disease) (4/17/2013), History of non-Hodgkin's lymphoma (12 yrs ago), history of PUD (peptic ulcer disease) (4/17/2013), history of PUD (peptic ulcer disease) (4/17/2013), History of pulmonary embolus (PE) (20+ yrs), History of tobacco abuse (52 yrs), Hyperlipidemia (4/17/2013), Insomnia (4/17/2013), Insomnia (0/27/4400), Metabolic encephalopathy (89/1/4283), Non Hodgkin's lymphoma ([address] (4/17/2013), Obesity (BMI 30-39.9), Pacemaker, Paroxysmal atrial fibrillation ([address] (6/6/2017), Poor historian (05/07/2018), Psychiatric disorder, Pulmonary embolism ([address] and Thyroid disease. She also has no past medical history of Asthma, Autoimmune disease ([address] CAD (coronary artery disease), Chronic kidney disease, Dementia, Difficult intubation, Heart failure ([address] Infectious disease, Liver disease, Malignant hyperthermia due to anesthesia, Nausea & vomiting, Pseudocholinesterase deficiency, Seizures ([address] Sleep apnea, Stroke ([address] or Unspecified adverse effect of anesthesia. Ms. Farzana Lozano  has a past surgical history that includes hx syed and bso; hx appendectomy; hx colectomy; hx partial thyroidectomy (5/00); hx fracture tx; hx breast augmentation (40 yrs ago); pr neurological procedure unlisted; hx pacemaker; and hx cataract removal (2010). Social History/Living Environment:  
Home Environment: Private residence # Steps to Enter: 0(ramp) Wheelchair Ramp: Yes One/Two Story Residence: One story Living Alone: No 
Support Systems: Spouse/Significant Other/Partner, Child(tato) Patient Expects to be Discharged to[de-identified] Rehabilitation facility Current DME Used/Available at Home: Commode, bedside, Shower chair, Walker, rolling, Wheelchair Tub or Shower Type: Tub/Shower combination Prior Level of Function/Work/Activity: She lives with  in a single story home with a ramp and ambulates limited distances with a rolling walker. Reports her  is unable to assist her due to having cancer. Daughters assists at times.  She also admits to 4-5 falls in the past 6 months. Number of Personal Factors/Comorbidities that affect the Plan of Care: 3+: HIGH COMPLEXITY EXAMINATION:  
Most Recent Physical Functioning:  
Gross Assessment: 
  
         
  
Posture: 
Posture (WDL): Exceptions to [facility] Posture Assessment: Forward head, Rounded shoulders Balance: 
Sitting: Impaired Sitting - Static: Good (unsupported) Sitting - Dynamic: Good (unsupported) Standing: Impaired Standing - Static: Fair Standing - Dynamic : Fair Bed Mobility: 
Rolling: Supervision Supine to Sit: Supervision Scooting: Stand-by assistance;Contact guard assistance Wheelchair Mobility: 
  
Transfers: 
Sit to Stand: Minimum assistance Stand to Sit: Minimum assistance Bed to Chair: Minimum assistance Gait: 
  
Base of Support: Center of gravity altered;Shift to right Gait Abnormalities: Shuffling gait Distance (ft): 7 Feet (ft) Assistive Device: Walker, rolling Ambulation - Level of Assistance: Minimal assistance Body Structures Involved: 1. Nerves 2. Lungs 3. Bones 4. Joints 5. Muscles 6. Ligaments Body Functions Affected: 1. Sensory/Pain 2. Respiratory 3. Neuromusculoskeletal 
4. Movement Related Activities and Participation Affected: 1. Learning and Applying Knowledge 2. Mobility 3. Self Care 4. Domestic Life 5. Interpersonal Interactions and Relationships 6. Community, Social and [place] Number of elements that affect the Plan of Care: 4+: HIGH COMPLEXITY CLINICAL PRESENTATION:  
Presentation: Evolving clinical presentation with changing clinical characteristics: MODERATE COMPLEXITY CLINICAL DECISION MAKING:  
[employer] 6 Clicks Basic Mobility Inpatient Short Form How much difficulty does the patient currently have. .. Unable A Lot A Little None 1. Turning over in bed (including adjusting bedclothes, sheets and blankets)?    ? 1   ? 2   ? 3   ? 4  
 2.  Sitting down on and standing up from a chair with arms ( e.g., wheelchair, bedside commode, etc.)   ? 1   ? 2   ? 3   ? 4  
3. Moving from lying on back to sitting on the side of the bed?   ? 1   ? 2   ? 3   ? 4 How much help from another person does the patient currently need. .. Total A Lot A Little None 4. Moving to and from a bed to a chair (including a wheelchair)? ? 1   ? 2   ? 3   ? 4  
5. Need to walk in hospital room? ? 1   ? 2   ? 3   ? 4  
6. Climbing 3-5 steps with a railing? ? 1   ? 2   ? 3   ? 4  
© 2007, Trustees of [address] [place] [address] [address] [id number], under license to [employer]. All rights reserved Score:  Initial: 10 Most Recent: X (Date: -- ) Interpretation of Tool:  Represents activities that are increasingly more difficult (i.e. Bed mobility, Transfers, Gait). Medical Necessity:    
· Patient demonstrates good ·  rehab potential due to higher previous functional level. Reason for Services/Other Comments: 
· Patient continues to require modification of therapeutic interventions to increase complexity of exercises · . Use of outcome tool(s) and clinical judgement create a POC that gives a: Questionable prediction of patient's progress: MODERATE COMPLEXITY  
  
 
 
 
TREATMENT:  
(In addition to Assessment/Re-Assessment sessions the following treatments were rendered) Pre-treatment Symptoms/Complaints:  None Pain: Initial:  
  0/10 Post Session:  0/10 Therapeutic Activity: (    10 minutes): Therapeutic activities including bed mobility training, sliding board transfer training, static/dynamic sitting balance training, scooting, posture training, instruction in NWB status on LLE, and patient education to improve mobility, strength and balance. Required moderate manual/verbal cues   to promote static and dynamic balance in standing and promote coordination of bilateral, lower extremity(s).   
 
Therapeutic Exercise: (  0 Minutes):  Exercises per grid below to improve mobility, strength and balance. Required minimal visual and verbal cues to promote proper body alignment, promote proper body posture and promote proper body mechanics. Progressed range and repetitions as indicated. Date: 
4/19/19 Date: 
 Date: 
  
ACTIVITY/EXERCISE AM PM AM PM AM PM  
Ambulation:           Distance Device Duration Seated Heel Raises 2x15R A Seated Toe Raises 2x15R A Seated Long Arc Quads 2x15B A Seated Marching 2x15B A Seated Hip Abduction 2x15B A       
        
B = bilateral; AA = active assistive; A = active; P = passive Braces/Orthotics/Lines/Etc:  
· IV 
· O2 Device: Heated, Hi flow nasal cannula Treatment/Session Assessment:   
· Response to Treatment:  See above · Interdisciplinary Collaboration:  
o Physical Therapist 
o Registered Nurse · After treatment position/precautions:  
o Supine in bed 
o Bed alarm/tab alert on 
o Bed/Chair-wheels locked 
o Bed in low position 
o Call light within reach 
o RN notified · Compliance with Program/Exercises: Will assess as treatment progresses · Recommendations/Intent for next treatment session: \"Next visit will focus on advancements to more challenging activities and reduction in assistance provided\". Total Treatment Duration: PT Patient Time In/Time Out Time In: 1 Time Out: 1018 Zeb Jay, PT, DPT

## 2019-04-21 NOTE — PROGRESS NOTES
Problem: Pressure Injury - Risk of 
Goal: *Prevention of pressure injury Description Document Jatinder Scale and appropriate interventions in the flowsheet. Outcome: Progressing Towards Goal 
  
Problem: Falls - Risk of 
Goal: *Absence of Falls Description Document Charbaldot President Fall Risk and appropriate interventions in the flowsheet. Outcome: Progressing Towards Goal 
  
Problem: Lower Extremity Fracture:Post-op Day 4 Goal: Respiratory Outcome: Progressing Towards Goal 
  
Problem: Lower Extremity Fracture:Discharge Outcomes Goal: *Optimal pain control at patient's stated goal 
Outcome: Progressing Towards Goal

## 2019-04-21 NOTE — PROGRESS NOTES
Oxygen Qualifier Room air: SpO2 with O2 and liter flow Resting SpO2  93%  96% on 2L Ambulating SpO2 Pt unable to ambulate at this time due  To non weight bearing. RN notified.  
 
 
Completed by: 
 
RT Jocelyn

## 2019-04-22 VITALS
DIASTOLIC BLOOD PRESSURE: 77 MMHG | HEIGHT: 71 IN | BODY MASS INDEX: 33.04 KG/M2 | WEIGHT: 236 LBS | OXYGEN SATURATION: 93 % | RESPIRATION RATE: 19 BRPM | SYSTOLIC BLOOD PRESSURE: 158 MMHG | HEART RATE: 81 BPM | TEMPERATURE: 98 F

## 2019-04-22 LAB
BACTERIA SPEC CULT: NORMAL
BACTERIA SPEC CULT: NORMAL
GLUCOSE BLD STRIP.AUTO-MCNC: 106 MG/DL (ref 65–100)
SERVICE CMNT-IMP: NORMAL
SERVICE CMNT-IMP: NORMAL

## 2019-04-22 PROCEDURE — 94640 AIRWAY INHALATION TREATMENT: CPT

## 2019-04-22 PROCEDURE — 82962 GLUCOSE BLOOD TEST: CPT

## 2019-04-22 PROCEDURE — 74011000250 HC RX REV CODE- 250: Performed by: INTERNAL MEDICINE

## 2019-04-22 PROCEDURE — 74011250637 HC RX REV CODE- 250/637: Performed by: ORTHOPAEDIC SURGERY

## 2019-04-22 PROCEDURE — 74011250637 HC RX REV CODE- 250/637: Performed by: FAMILY MEDICINE

## 2019-04-22 RX ORDER — OXYCODONE HYDROCHLORIDE 5 MG/1
5-10 TABLET ORAL
Qty: 18 TAB | Refills: 0 | Status: SHIPPED | OUTPATIENT
Start: 2019-04-22 | End: 2019-04-25

## 2019-04-22 RX ORDER — METOPROLOL TARTRATE 25 MG/1
25 TABLET, FILM COATED ORAL
Qty: 120 TAB | Refills: 0 | Status: SHIPPED | OUTPATIENT
Start: 2019-04-22 | End: 2019-10-02

## 2019-04-22 RX ADMIN — VENLAFAXINE HYDROCHLORIDE 150 MG: 75 CAPSULE, EXTENDED RELEASE ORAL at 08:30

## 2019-04-22 RX ADMIN — ACETAMINOPHEN 650 MG: 325 TABLET, FILM COATED ORAL at 06:00

## 2019-04-22 RX ADMIN — APIXABAN 5 MG: 5 TABLET, FILM COATED ORAL at 08:30

## 2019-04-22 RX ADMIN — ALBUTEROL SULFATE 2.5 MG: 2.5 SOLUTION RESPIRATORY (INHALATION) at 07:15

## 2019-04-22 RX ADMIN — Medication 10 ML: at 06:01

## 2019-04-22 NOTE — PROGRESS NOTES
TRANSFER - OUT REPORT: 
 
Verbal report given to Ivana Lackey RN (name) on Grover Blades  being transferred to [facility] (unit) for routine progression of care Report consisted of patients Situation, Background, Assessment and  
Recommendations(SBAR). Information from the following report(s) SBAR, Kardex, ED Summary, OR Summary, Procedure Summary, Intake/Output and MAR was reviewed with the receiving nurse. Lines:  
Peripheral IV 04/19/19 Anterior; Left Wrist (Active) Site Assessment Clean, dry, & intact 4/21/2019  7:58 PM  
Phlebitis Assessment 0 4/21/2019  7:58 PM  
Infiltration Assessment 0 4/21/2019  7:58 PM  
Dressing Status Clean, dry, & intact 4/21/2019  7:58 PM  
Dressing Type Tape;Transparent 4/21/2019  7:58 PM  
Hub Color/Line Status Yellow;Capped 4/21/2019  7:58 PM  
Action Taken Open ports on tubing capped 4/19/2019  7:13 PM  
Alcohol Cap Used No 4/19/2019  7:13 PM  
  
 
Opportunity for questions and clarification was provided. Patient transported with: 
 [employer] [facility]

## 2019-04-22 NOTE — PROGRESS NOTES
Patient is discharging to [facility] at [facility] today. Transport by Radha Ibarra at [facility]. RN provided with room and report number. Patient is aware of discharge and transport time. Voice message left for patient's daughter providing transport time. No private information regarding patient was provided in voice message. Care Management Interventions PCP Verified by CM: Yes Mode of Transport at Discharge: BLS Transition of Care Consult (CM Consult): Discharge Planning Discharge Durable Medical Equipment: No(to be ordered at SNF if indicated) Physical Therapy Consult: Yes Occupational Therapy Consult: No 
Speech Therapy Consult: No 
Confirm Follow Up Transport: Family Plan discussed with Pt/Family/Caregiver: Yes(Spoke with family and patient in room.) [place] of Choice Offered: Yes Discharge Location Discharge Placement: Rehab Unit Subacute

## 2019-04-22 NOTE — PROGRESS NOTES
Problem: Diabetes Self-Management Goal: *Disease process and treatment process Description Define diabetes and identify own type of diabetes; list 3 options for treating diabetes. 4/22/2019 0744 by Isiah Oakley RN Outcome: Progressing Towards Goal 
4/22/2019 0710 by Kathleen Mena RN Outcome: Progressing Towards Goal 
Goal: *Incorporating nutritional management into lifestyle Description Describe effect of type, amount and timing of food on blood glucose; list 3 methods for planning meals. 4/22/2019 0744 by Isiah Oakley RN Outcome: Progressing Towards Goal 
4/22/2019 0710 by Kathleen Mena RN Outcome: Progressing Towards Goal 
Goal: *Incorporating physical activity into lifestyle Description State effect of exercise on blood glucose levels. 4/22/2019 0744 by Isiah Oakley RN Outcome: Progressing Towards Goal 
4/22/2019 0710 by Kathleen Mena RN Outcome: Progressing Towards Goal 
Goal: *Developing strategies to promote health/change behavior Description Define the ABC's of diabetes; identify appropriate screenings, schedule and personal plan for screenings. 4/22/2019 0744 by Isiah Oakley RN Outcome: Progressing Towards Goal 
4/22/2019 0710 by Kathleen Mena RN Outcome: Progressing Towards Goal 
Goal: *Using medications safely Description State effect of diabetes medications on diabetes; name diabetes medication taking, action and side effects. 4/22/2019 0744 by Isiah Oakley RN Outcome: Progressing Towards Goal 
4/22/2019 0710 by Kathleen Mena RN Outcome: Progressing Towards Goal 
Goal: *Monitoring blood glucose, interpreting and using results Description Identify recommended blood glucose targets  and personal targets. 4/22/2019 0744 by Isiah Oakley RN Outcome: Progressing Towards Goal 
4/22/2019 0710 by Katlheen Mena RN Outcome: Progressing Towards Goal 
Goal: *Prevention, detection, treatment of acute complications Description List symptoms of hyper- and hypoglycemia; describe how to treat low blood sugar and actions for lowering  high blood glucose level. 4/22/2019 0744 by Yunier Barajas RN Outcome: Progressing Towards Goal 
4/22/2019 0710 by Nini Mena RN Outcome: Progressing Towards Goal 
Goal: *Prevention, detection and treatment of chronic complications Description Define the natural course of diabetes and describe the relationship of blood glucose levels to long term complications of diabetes. 4/22/2019 0744 by Yunier Barajas RN Outcome: Progressing Towards Goal 
4/22/2019 0710 by Nini Mena RN Outcome: Progressing Towards Goal 
Goal: *Developing strategies to address psychosocial issues Description Describe feelings about living with diabetes; identify support needed and support network 4/22/2019 0744 by Yunier Barajas RN Outcome: Progressing Towards Goal 
4/22/2019 0710 by Nini Mena RN Outcome: Progressing Towards Goal 
  
Problem: Patient Education: Go to Patient Education Activity Goal: Patient/Family Education 4/22/2019 0744 by Yunier Barajas RN Outcome: Progressing Towards Goal 
4/22/2019 0710 by Nini Mena RN Outcome: Progressing Towards Goal 
  
Problem: Pressure Injury - Risk of 
Goal: *Prevention of pressure injury Description Document Jatinder Scale and appropriate interventions in the flowsheet. 4/22/2019 0744 by Yunier Barajas RN Outcome: Progressing Towards Goal 
4/22/2019 0710 by Nini Mena RN Outcome: Progressing Towards Goal 
  
Problem: Patient Education: Go to Patient Education Activity Goal: Patient/Family Education 4/22/2019 0744 by Yunier Barajas RN Outcome: Progressing Towards Goal 
4/22/2019 0710 by Nini Mena RN Outcome: Progressing Towards Goal 
  
Problem: Falls - Risk of 
Goal: *Absence of Falls Description Document Clide Failing Fall Risk and appropriate interventions in the flowsheet.  
4/22/2019 0744 by Yunier Barajas RN 
 Outcome: Progressing Towards Goal 
4/22/2019 0710 by Sweat, Vivian Nageotte, RN Outcome: Progressing Towards Goal 
  
Problem: Patient Education: Go to Patient Education Activity Goal: Patient/Family Education 4/22/2019 0744 by Luis Travis RN Outcome: Progressing Towards Goal 
4/22/2019 0710 by Sweat, Vivian Nageotte, RN Outcome: Progressing Towards Goal 
  
Problem: Interdisciplinary Rounds Goal: Interdisciplinary Rounds 4/22/2019 0744 by Luis Travis RN Outcome: Progressing Towards Goal 
4/22/2019 0710 by Sweat, Vivian Nageotte, RN Outcome: Progressing Towards Goal 
  
Problem: Patient Education: Go to Patient Education Activity Goal: Patient/Family Education 4/22/2019 0744 by Luis Travis RN Outcome: Progressing Towards Goal 
4/22/2019 0710 by Sweat, Vivian Nageotte, RN Outcome: Progressing Towards Goal 
  
Problem: Lower Extremity Fracture:Post-op Day 4 Goal: Off Pathway (Use only if patient is Off Pathway) Outcome: Progressing Towards Goal 
Goal: Activity/Safety Outcome: Progressing Towards Goal 
Goal: Diagnostic Test/Procedures Outcome: Progressing Towards Goal 
Goal: Nutrition/Diet Outcome: Progressing Towards Goal 
Goal: Discharge Planning Outcome: Progressing Towards Goal 
Goal: Medications Outcome: Progressing Towards Goal 
Goal: Respiratory Outcome: Progressing Towards Goal 
Goal: Treatments/Interventions/Procedures Outcome: Progressing Towards Goal 
Goal: Psychosocial 
Outcome: Progressing Towards Goal 
  
Problem: Lower Extremity Fracture:Discharge Outcomes Goal: *Hemodynamically stable 4/22/2019 0744 by Luis Travis RN Outcome: Progressing Towards Goal 
4/22/2019 0710 by Sweat, Vivian Nageotte, RN Outcome: Progressing Towards Goal 
Goal: *Modified independence with transfers, ambulation on levels with assistance devices, stair climbing, ADL's 
4/22/2019 0744 by Luis Travis RN Outcome: Progressing Towards Goal 
4/22/2019 0710 by Sweat, Vivian Nageotte, RN 
 Outcome: Progressing Towards Goal 
Goal: *Independent with active exercises 4/22/2019 0744 by Jere Robbins RN Outcome: Progressing Towards Goal 
4/22/2019 0710 by Zakia Mena RN Outcome: Progressing Towards Goal 
Goal: *Describes follow-up/return visits to physicians 4/22/2019 0744 by Jere Robbins RN Outcome: Progressing Towards Goal 
4/22/2019 0710 by Zakia Mena RN Outcome: Progressing Towards Goal 
Goal: *Tolerating diet 4/22/2019 0744 by Jere Robbins RN Outcome: Progressing Towards Goal 
4/22/2019 0710 by Zakia Mena RN Outcome: Progressing Towards Goal 
Goal: *Optimal pain control at patient's stated goal 
4/22/2019 0744 by Zakia Mena RN Outcome: Progressing Towards Goal 
4/22/2019 0710 by Zakia Mena RN Outcome: Progressing Towards Goal 
Goal: *Adequate air exchange 4/22/2019 0744 by Jere Robbins RN Outcome: Progressing Towards Goal 
4/22/2019 0710 by Zakia Mena RN Outcome: Progressing Towards Goal 
Goal: *Lungs clear or at baseline 4/22/2019 0744 by Jere Robbins RN Outcome: Progressing Towards Goal 
4/22/2019 0710 by Zakia Mena RN Outcome: Progressing Towards Goal 
Goal: *Afebrile 4/22/2019 0744 by Jere Robbins RN Outcome: Progressing Towards Goal 
4/22/2019 0710 by Zakia Mena RN Outcome: Progressing Towards Goal 
Goal: *Incision intact without signs of infection, redness, warmth 4/22/2019 0744 by Jere Robbins RN Outcome: Progressing Towards Goal 
4/22/2019 0710 by Zakia Mena RN Outcome: Progressing Towards Goal 
Goal: *Absence of deep venous thrombosis signs and symptoms(Stroke Metric) 4/22/2019 0744 by Jere Robbins RN Outcome: Progressing Towards Goal 
4/22/2019 0710 by Zakia Mena RN Outcome: Progressing Towards Goal 
Goal: *Active bowel function 4/22/2019 0744 by Jere Robbins RN Outcome: Progressing Towards Goal 
4/22/2019 0710 by Zakia Mena RN 
 Outcome: Progressing Towards Goal 
Goal: *Adequate urinary output 4/22/2019 0744 by Raine Bryant RN Outcome: Progressing Towards Goal 
4/22/2019 0710 by Jen Mena RN Outcome: Progressing Towards Goal 
Goal: *Discharge anxiety minimal 
4/22/2019 0744 by Raine Bryant RN Outcome: Progressing Towards Goal 
4/22/2019 0710 by Jen Mena RN Outcome: Progressing Towards Goal 
Goal: *Describes available resources and support systems 4/22/2019 0744 by Raine Bryant RN Outcome: Progressing Towards Goal 
4/22/2019 0710 by Jen Mena RN Outcome: Progressing Towards Goal 
  
Problem: Hypertension Goal: *Blood pressure within specified parameters Outcome: Progressing Towards Goal 
Goal: *Fluid volume balance Outcome: Progressing Towards Goal 
Goal: *Labs within defined limits Outcome: Progressing Towards Goal 
  
Problem: Patient Education: Go to Patient Education Activity Goal: Patient/Family Education Outcome: Progressing Towards Goal 
  
Problem: Tissue Perfusion - Cardiopulmonary, Altered Goal: *Optimize tissue perfusion Outcome: Progressing Towards Goal 
Goal: *Absence of hypoxia Outcome: Progressing Towards Goal 
  
Problem: Patient Education: Go to Patient Education Activity Goal: Patient/Family Education Outcome: Progressing Towards Goal 
  
Problem: Patient Education: Go to Patient Education Activity Goal: Patient/Family Education Outcome: Progressing Towards Goal 
  
Problem: Afib: Discharge Outcomes (not in CAT) Goal: *Hemodynamically stable Outcome: Progressing Towards Goal 
Goal: *Stable cardiac rhythm Outcome: Progressing Towards Goal 
Goal: *Lungs clear or at baseline Outcome: Progressing Towards Goal 
Goal: *Optimal pain control at patient's stated goal 
Outcome: Progressing Towards Goal 
Goal: *Identifies cardiac risk factors Outcome: Progressing Towards Goal 
Goal: *Verbalizes home exercise program, activity guidelines, cardiac precautions Outcome: Progressing Towards Goal 
Goal: *Verbalizes understanding and describes prescribed diet Outcome: Progressing Towards Goal 
Goal: *Verbalizes understanding and describes medication purposes and frequencies Outcome: Progressing Towards Goal 
Goal: *Anxiety reduced or absent Outcome: Progressing Towards Goal 
Goal: *Understands and describes signs and symptoms to report to providers(Stroke Metric) Outcome: Progressing Towards Goal 
Goal: *Describes follow-up/return visits to physicians Outcome: Progressing Towards Goal 
Goal: *Describes available resources and support systems Outcome: Progressing Towards Goal 
  
Problem: Breathing Pattern - Ineffective Goal: *Absence of hypoxia Outcome: Progressing Towards Goal 
Goal: *Use of effective breathing techniques Outcome: Progressing Towards Goal 
  
Problem: Gas Exchange - Impaired Goal: *Absence of hypoxia Outcome: Progressing Towards Goal 
  
Problem: Patient Education: Go to Patient Education Activity Goal: Patient/Family Education Outcome: Progressing Towards Goal 
  
Problem: Patient Education: Go to Patient Education Activity Goal: Patient/Family Education Outcome: Progressing Towards Goal 
  
Problem: Nutrition Deficit Goal: *Optimize nutritional status Outcome: Progressing Towards Goal

## 2019-04-22 NOTE — PROGRESS NOTES
04/22/19 0719 Oxygen Therapy O2 Sat (%) 93 % Pulse via Oximetry 100 beats per minute O2 Device Room air

## 2019-04-22 NOTE — DISCHARGE SUMMARY
Hospitalist Discharge Summary     Admit Date:  2019  2:51 AM   Name:  Earnestine Boxer   Age:  76 y.o.  :  1945   MRN:  [id number]   PCP:  Margarita Mccabe MD  Treatment Team: Attending Provider: Gavin Cruz DO; Consulting Provider: Rosendo Ackerman MD; Consulting Provider: Cuong Flores DO; Utilization Review: Dee Dee Green RN; Consulting Provider: Rae Mcneil MD; Care Manager: Antonia Stover; Consulting Provider: Nba Estrella MD; Occupational Therapy Assistant: Galdino Bullock; Physical Therapy Assistant: Ivis Madera PTA    Problem List for this Hospitalization:  Hospital Problems as of 2019 Date Reviewed: 2019          Codes Class Noted - Resolved POA    COPD (chronic obstructive pulmonary disease) ([facility] ICD-10-CM: J44.9  ICD-9-CM: 576  2019 - Present Unknown        Acute hypoxemic respiratory failure ([facility] ICD-10-CM: J96.01  ICD-9-CM: 518.81  2019 - Present Unknown        Chronic hypercapnic respiratory failure ([facility] ICD-10-CM: D30.65  ICD-9-CM: 518.83  2019 - Present Unknown        * (Principal) Ankle fracture ICD-10-CM: H49.131S  ICD-9-CM: 824.8  2019 - Present Yes        Fall ICD-10-CM: W19. Lexy Lord  ICD-9-CM: E888.9  2019 - Present Yes        Hypoxia ICD-10-CM: R09.02  ICD-9-CM: 799.02  10/2/2017 - Present Yes    Overview Signed 10/2/2017  2:28 AM by Enmanuel Duncan MD     Probable COPD + obesity             Morbid obesity ([facility] (Chronic) ICD-10-CM: E66.01  ICD-9-CM: 278.01  10/2/2017 - Present Yes        Paroxysmal atrial fibrillation ([facility] (Chronic) ICD-10-CM: I48.0  ICD-9-CM: 427.31  2017 - Present Yes        Pacemaker ICD-10-CM: Z95.0  ICD-9-CM: V45.01  2017 - Present Yes    Overview Signed 2017  6:14 PM by Alysia Farfan     1.  [employer] MRI Pacemaker - 2017             Atrial flutter [facility]) (Chronic) ICD-10-CM: H43.54  ICD-9-CM: 427.32  10/21/2016 - Present     Overview Signed 12/22/2016  9:25 AM by Yenny Rivera     1. Atrial Flutter - Dec 2015 - Ablation of right-sided isthmus-dependent atypical clock-wise atrial flutter. 2. ARACELY Showed RENO Velocity of 25cm/sec             Essential hypertension (Chronic) ICD-10-CM: I10  ICD-9-CM: 401.9  10/21/2016 - Present Yes        Chronic bronchitis (HCC) (Chronic) ICD-10-CM: M67  ICD-9-CM: 491.9  4/17/2013 - Present Yes        Anxiety ICD-10-CM: F41.9  ICD-9-CM: 300.00  4/17/2013 - Present Yes        GERD (gastroesophageal reflux disease) (Chronic) ICD-10-CM: K21.9  ICD-9-CM: 530.81  4/17/2013 - Present Yes    Overview Signed 4/17/2013  4:27 PM by Zahida Overton LPN     chronic             DM type 2 (diabetes mellitus, type 2) ([facility] (Chronic) ICD-10-CM: E11.9  ICD-9-CM: 250.00  4/17/2013 - Present Yes                Admission HPI from 4/14/2019:    \"   From previous notes: \"74 y.o. Female with PMH Afib on Eliquis, DM II and congenital kidney d(with 1 kidney) was admitted for left ankle fracture after she sustained a fall when she off her bed. Pt is laying in bed with left ankle splint on. Reports she is hurting at her right ribs where she hit it at a night stand when she fell. Right rib pain is worse only when she takes a deep breath. Has some cough, but denies shortness of breath or chest pain. \"     Hospital Course:  Left ankle fracture underwent successful orif 5 day ago. Post op course complicated by acute hypoxemic/hypercarbic resp.failure. Dramatic improvement after discontinue klonopin and I suspect she has been chronically hypoventilating related to this med. Klonopin should not be restarted. She has dm and sugars controlled postop with lantus and ss insulin novolog for prandial. She may resume her home med and eliquis and prn metoprolol for parox. Afib. She appears stable for discharge to snf for str. She does  Not qualify for home oxygen. She will need fsbs monitoring.      Follow up instructions and discharge meds at bottom of this note.  Plan was discussed with patient and staff. All questions answered. Patient was stable at time of discharge. 10 systems reviewed and negative except as noted in HPI. Diagnostic Imaging/Tests:   Xr Ankle Lt Min 3 V    Result Date: 4/14/2019  AP, lateral, oblique views left ankle INDICATION: Trauma. COMPARISON: None FINDINGS: There is fracture of the lateral malleolus, at the level of the tibiofibular syndesmosis. Distal fracture fragment is slightly displaced laterally. Ankle mortise appears maintained. There is ankle soft tissue swelling. No medial or posterior malleolar fracture. Post surgical changes in the midfoot, partially seen. IMPRESSION: 1. Stewart B lateral malleolar fracture. Ct Head Wo Cont    Result Date: 4/14/2019  Noncontrast CT of the brain. COMPARISON: February 19, 2018 INDICATION: Head trauma. Hit head. TECHNIQUE: Contiguous axial images were obtained from the skull base through the vertex without IV contrast. Radiation dose reduction techniques were used for this study:  Our CT scanners use one or all of the following: Automated exposure control, adjustment of the mA and/or kVp according to patient's size, iterative reconstruction. FINDINGS: There is no acute intracranial hemorrhage or evidence for acute territorial infarction. There is no mass effect, midline shift or hydrocephalus. There is no extra-axial fluid collection. The cerebellum and brainstem are grossly unremarkable. Periventricular diffuse hypodensities are nonspecific and likely secondary to chronic small vessel changes. Mild generalized cerebral volume loss, age-related Included globes appear intact. Partially visualized paranasal sinuses and mastoid air cells are aerated. Surrounding bones are intact. IMPRESSION: 1. Stable chronic findings. No CT evidence of acute intracranial pathology. Xr Ribs Bi W Pa Chest 4 Vs    Result Date: 4/14/2019  Clinical history: Right rib pain after fall.  TECHNIQUE: Frontal chest and bilateral rib x-rays. FINDINGS: The heart is enlarged. Mediastinal contour is within normal limits. The left-sided cardiac pacer. No pneumothorax, pulmonary edema or pleural effusion. There is no displaced rib fracture. Body habitus diminishes sensitivity. IMPRESSION: 1. No displaced rib fracture is seen. 2. No pulmonary consolidation or pneumothorax. Echocardiogram results:  No results found for this visit on 04/14/19. All Micro Results     Procedure Component Value Units Date/Time    CULTURE, BLOOD [[id number]] Collected:  04/17/19 2206    Order Status:  Completed Specimen:  Blood Updated:  04/22/19 0816     Special Requests: --        RIGHT  ARM       Culture result: NO GROWTH 5 DAYS       CULTURE, BLOOD [[id number]] Collected:  04/17/19 2204    Order Status:  Completed Specimen:  Blood Updated:  04/22/19 0816     Special Requests: --        RIGHT  ARM       Culture result: NO GROWTH 5 DAYS       CULTURE, URINE [[id number]] Collected:  04/17/19 1842    Order Status:  Completed Specimen:  Urine from Clean catch Updated:  04/20/19 0732     Special Requests: NO SPECIAL REQUESTS        Culture result: NO GROWTH 2 DAYS             Labs: Results:       BMP, Mg, Phos Recent Labs     04/20/19 0316      K 4.0      CO2 32   AGAP 6*   BUN 19   CREA 0.86   CA 9.1   *      CBC Recent Labs     04/20/19 0316   WBC 6.1   RBC 4.14   HGB 11.9   HCT 38.6      GRANS 56   LYMPH 26   EOS 7   MONOS 9   BASOS 1   IG 0   ANEU 3.4   ABL 1.6   DENISE 0.5   ABM 0.5   ABB 0.1   AIG 0.0      LFT No results for input(s): SGOT, ALT, TBIL, AP, TP, ALB, GLOB, AGRAT, GPT in the last 72 hours.    Cardiac Testing Lab Results   Component Value Date/Time    BNP 91 (H) 04/20/2019 03:16 AM     07/24/2013 02:32 PM      Coagulation Tests Lab Results   Component Value Date/Time    Prothrombin time 10.7 01/13/2017 01:14 PM    Prothrombin time 10.7 12/22/2016 07:46 AM    INR 1.0 01/13/2017 01:14 PM    INR 1.0 12/22/2016 07:46 AM    aPTT 27.7 01/13/2017 01:14 PM      A1c Lab Results   Component Value Date/Time    Hemoglobin A1c 8.0 (H) 04/08/2019 02:49 PM    Hemoglobin A1c 7.4 (H) 09/13/2018 02:44 PM    Hemoglobin A1c 7.4 (H) 06/13/2018 03:40 PM      Lipid Panel Lab Results   Component Value Date/Time    Cholesterol, total 254 (H) 01/11/2017 09:13 AM    HDL Cholesterol 63 01/11/2017 09:13 AM    LDL, calculated 138 (H) 01/11/2017 09:13 AM    VLDL, calculated 53 (H) 01/11/2017 09:13 AM    Triglyceride 267 (H) 01/11/2017 09:13 AM      Thyroid Panel Lab Results   Component Value Date/Time    TSH 1.740 01/30/2018 03:04 PM    TSH 1.950 01/11/2017 09:13 AM    T4, Total 5.8 04/06/2015 08:34 AM    T3 Uptake 27 04/06/2015 08:34 AM        Most Recent UA Lab Results   Component Value Date/Time    Color YELLOW 04/17/2019 06:42 PM    Appearance CLEAR 04/17/2019 06:42 PM    Specific gravity 1.013 04/17/2019 06:42 PM    pH (UA) 5.0 04/17/2019 06:42 PM    Protein NEGATIVE  04/17/2019 06:42 PM    Glucose NEGATIVE  04/17/2019 06:42 PM    Ketone NEGATIVE  04/17/2019 06:42 PM    Bilirubin NEGATIVE  04/17/2019 06:42 PM    Blood NEGATIVE  04/17/2019 06:42 PM    Urobilinogen 0.2 04/17/2019 06:42 PM    Nitrites NEGATIVE  04/17/2019 06:42 PM    Leukocyte Esterase NEGATIVE  04/17/2019 06:42 PM        Allergies   Allergen Reactions    Aspirin Nausea and Vomiting     Immunization History   Administered Date(s) Administered    Influenza High Dose Vaccine PF 12/17/2018    Influenza Vaccine 10/15/2012, 01/08/2015, 10/29/2015    Pneumococcal Conjugate (PCV-13) 10/29/2015    Pneumococcal Polysaccharide (PPSV-23) 07/25/2017    TB Skin Test (PPD) Intradermal 10/03/2017, 04/14/2019       All Labs from Last 24 Hrs:  Recent Results (from the past 24 hour(s))   GLUCOSE, POC    Collection Time: 04/21/19 11:36 AM   Result Value Ref Range    Glucose (POC) 232 (H) 65 - 100 mg/dL   GLUCOSE, POC    Collection Time: 04/21/19  3:46 PM   Result Value Ref Range Glucose (POC) 219 (H) 65 - 100 mg/dL   GLUCOSE, POC    Collection Time: 04/21/19 10:39 PM   Result Value Ref Range    Glucose (POC) 217 (H) 65 - 100 mg/dL   GLUCOSE, POC    Collection Time: 04/22/19  6:52 AM   Result Value Ref Range    Glucose (POC) 106 (H) 65 - 100 mg/dL       Discharge Exam:  Patient Vitals for the past 24 hrs:   Temp Pulse Resp BP SpO2   04/22/19 0719     93 %   04/22/19 0705 98 °F (36.7 °C) 81 19 158/77 99 %   04/22/19 0600 97.8 °F (36.6 °C) 78 20 186/76    04/22/19 0447 98.9 °F (37.2 °C) 88 20 (!) 160/96    04/21/19 2317 98.6 °F (37 °C) 70 18 133/72 99 %   04/21/19 2032 96.8 °F (36 °C) 89 18 127/73 98 %   04/21/19 2022     93 %   04/21/19 1559 98.4 °F (36.9 °C) 97 18 118/72 91 %   04/21/19 1448     91 %   04/21/19 1140 98.2 °F (36.8 °C) 90 18 131/83 90 %     Oxygen Therapy  O2 Sat (%): 93 % (04/22/19 0719)  Pulse via Oximetry: 100 beats per minute (04/22/19 0719)  O2 Device: Room air (04/22/19 0719)  O2 Flow Rate (L/min): 2 l/min (04/21/19 0731)  O2 Temperature: 87.8 °F (31 °C) (04/19/19 0527)  FIO2 (%): 21 % (04/21/19 2022)  No intake or output data in the 24 hours ending 04/22/19 0830      Physical exam:  General:    Well nourished. Alert. No distress. Eyes:   Normal sclera. Extraocular movements intact. ENT:  Normocephalic, atraumatic. Moist mucous membranes  CV:   Regular rate and rhythm. No murmur, rub, or gallop. Lungs:  Clear to auscultation bilaterally. No wheezing, rhonchi, or rales. Abdomen: Soft, nontender, nondistended. Bowel sounds normal.   Extremities: Warm and dry. No cyanosis or edema. Neurologic: No focal deficits  Skin:     No rashes or jaundice. Psych:  Normal mood and affect.  Short term memory is poor    Discharge Info:   Current Discharge Medication List      START taking these medications    Details   metoprolol tartrate (LOPRESSOR) 25 mg tablet Take 1 Tab by mouth every six (6) hours as needed for Other (heart rate greater than 110).  Qty: 120 Tab, Refills: 0      oxyCODONE IR (ROXICODONE) 5 mg immediate release tablet Take 1-2 Tabs by mouth every four (4) hours as needed for Pain for up to 3 days. Max Daily Amount: 60 mg.  Qty: 18 Tab, Refills: 0    Associated Diagnoses: Closed fracture of distal end of left fibula, unspecified fracture morphology, initial encounter         CONTINUE these medications which have NOT CHANGED    Details   diphenoxylate-atropine (LOMOTIL) 2.5-0.025 mg per tablet Take 2 Tabs by mouth four (4) times daily as needed for Diarrhea. Max Daily Amount: 8 Tabs. Qty: 20 Tab, Refills: 0    Associated Diagnoses: Diarrhea, unspecified type      metFORMIN (GLUCOPHAGE) 500 mg tablet TAKE 1 TABLET BY MOUTH TWICE DAILY WITH MEALS  Qty: 180 Tab, Refills: 3    Comments: Please consider 90 day supplies to promote better adherence  Associated Diagnoses: Type 2 diabetes mellitus with diabetic neuropathy, with long-term current use of insulin ([facility])      traZODone (DESYREL) 100 mg tablet Take 1 Tab by mouth nightly. Qty: 30 Tab, Refills: 6      nystatin (MYCOSTATIN) powder Apply  to affected area four (4) times daily. Qty: 60 g, Refills: 12      glimepiride (AMARYL) 4 mg tablet Take 1 Tab by mouth two (2) times a day. Qty: 180 Tab, Refills: 3    Associated Diagnoses: Type 2 diabetes mellitus with diabetic neuropathy, with long-term current use of insulin ([facility])      insulin degludec (TRESIBA FLEXTOUCH U-100) 100 unit/mL (3 mL) inpn 10 Units by SubCUTAneous route Daily (before dinner). Qty: 5 Pen, Refills: 12    Associated Diagnoses: Type 2 diabetes mellitus without complication, without long-term current use of insulin ([facility])      acetaminophen (TYLENOL) 500 mg tablet Take 500 mg by mouth every six (6) hours as needed for Pain.       OTHER Diabetic shoes    (E11.40,  Z79.4) Type 2 diabetes mellitus with diabetic neuropathy, with long-term current use of insulin (HCC)  Qty: 2 Each, Refills: 0    Associated Diagnoses: Type 2 diabetes mellitus with diabetic neuropathy, with long-term current use of insulin ([facility])      venlafaxine-SR (EFFEXOR-XR) 150 mg capsule Take 1 Cap by mouth daily. Qty: 90 Cap, Refills: 3    Associated Diagnoses: Anxiety      donepezil (ARICEPT) 10 mg tablet Take 1 Tab by mouth nightly. 1/2 tab po qhs x 7 days, then whole  Qty: 30 Tab, Refills: 6    Associated Diagnoses: Memory impairment      apixaban (ELIQUIS) 5 mg tablet Take 1 Tab by mouth two (2) times a day. Qty: 180 Tab, Refills: 3    Associated Diagnoses: Atrial fibrillation, unspecified type ([facility])      Insulin Needles, Disposable, 32 gauge x 5/32\" ndle USE ONE PEN NEEDLE ONCE DAILY WITH INSULIN  Qty: 100 Pen Needle, Refills: 12    Comments: Please consider 90 day supplies to promote better adherence  Associated Diagnoses: Type 2 diabetes mellitus without complication, without long-term current use of insulin ([facility])      omega 3-dha-epa-fish oil (FISH OIL) 100-160-1,000 mg cap Take 1 Cap by mouth two (2) times a day. cholecalciferol (VITAMIN D3) 1,000 unit cap Take 1,000 Units by mouth daily.  Last dose 5/7/18          STOP taking these medications       clonazePAM (KLONOPIN) 2 mg tablet Comments:   Reason for Stopping:                 Disposition: SNF    Activity: PT/OT Eval and Treat  Diet: DIET DIABETIC CONSISTENT CARB Regular    Follow-up Appointments   Procedures    FOLLOW UP VISIT Appointment in: 3 - 5 Days Follow with house provider as soon as possible     Follow with house provider as soon as possible     Standing Status:   Standing     Number of Occurrences:   1     Order Specific Question:   Appointment in     Answer:   3 - 5 Days         Follow-up Information     Follow up With Specialties Details Why Contact Info    [facility]   [phone]  [facility], [address] [facility]   [address] [address] [phone]                Time spent in patient discharge planning and coordination 42 minutes.

## 2019-04-23 ENCOUNTER — PATIENT OUTREACH (OUTPATIENT)
Dept: CASE MANAGEMENT | Age: 74
End: 2019-04-23

## 2019-04-23 NOTE — PROGRESS NOTES
This note will not be viewable in [address] HUMBERTO outreach postponed for 21 days due to discharge to non-preferred network SNF [facility]).

## 2019-05-14 ENCOUNTER — PATIENT OUTREACH (OUTPATIENT)
Dept: CASE MANAGEMENT | Age: 74
End: 2019-05-14

## 2019-05-14 NOTE — PROGRESS NOTES
This note will not be viewable in [address] 1st Attempt to contact patient for HUMBERTO call, no answer left VM for returned call. Will attempt to contact patient again within 24 hours

## 2019-05-15 ENCOUNTER — PATIENT OUTREACH (OUTPATIENT)
Dept: CASE MANAGEMENT | Age: 74
End: 2019-05-15

## 2019-05-15 NOTE — PROGRESS NOTES
This note will not be viewable in [address] Patient is still at [facility] with no expected DC date. No HUMBERTO is indicated at this time. Episode closed.

## 2019-06-20 PROBLEM — I48.21 PERMANENT ATRIAL FIBRILLATION (HCC): Status: ACTIVE | Noted: 2019-06-20

## 2019-09-09 ENCOUNTER — TELEPHONE (OUTPATIENT)
Dept: CASE MANAGEMENT | Age: 74
End: 2019-09-09

## 2019-09-09 DIAGNOSIS — Z12.11 COLON CANCER SCREENING: Primary | ICD-10-CM

## 2019-09-09 NOTE — TELEPHONE ENCOUNTER
See if the patient any better and if levaquin helped, if not can try different antibiotic. Also received note she needed a new order for cologuard.

## 2020-06-03 PROBLEM — R06.09 DYSPNEA ON EXERTION: Status: ACTIVE | Noted: 2020-06-03

## 2020-06-11 DIAGNOSIS — E78.2 MIXED HYPERLIPIDEMIA: Primary | ICD-10-CM

## 2020-10-03 NOTE — PROGRESS NOTES
Orthopedic Progress Note 2019 Admit Date: 2019 Admit Diagnosis: Fall [W19. Raad Campi Ankle fracture [R53.258L] Post Op day: 2 Days Post-Op Subjective:  
 
Berto Romo Much more alert today Objective:  
 
Vital Signs:   
Temp (24hrs), Av.4 °F (37.4 °C), Min:98.6 °F (37 °C), Max:101.5 °F (38.6 °C) LAB:   
[unfilled] Lab Results Component Value Date/Time INR 1.0 2017 01:14 PM  
 INR 1.0 2016 07:46 AM  
 
Lab Results Component Value Date/Time HGB 11.1 (L) 2019 04:12 AM  
 HGB 12.2 2019 05:19 AM  
 
 
Physical Exam: 
 
No apparent distress No neurovascular issues reported No gross problems noted LLE splint in place Plan:  
 
Continue PT/OT rehab as indicated Nursing and SS for disposition plans- to Rehab when available Signed By: Logan Childers MD 
 fantasma'd telephone report from clifton ramos.  Patient was transferred to room 334, a&ox4, c/o right right leg, just administered 2 tabs of 5-325mg Veblen. Patient has a purewick in place, just ordered dinner and bs was 373- rn called dr. Venessa Delgado and he ordered

## 2020-12-29 ENCOUNTER — TELEPHONE (OUTPATIENT)
Dept: PHARMACY | Age: 75
End: 2020-12-29

## 2020-12-30 NOTE — TELEPHONE ENCOUNTER
Gerson Leon MD - would patient benefit from statin therapy?  - diabetic patient with high risk ASCVD risk at 56.5% using last BP of 170/90, 34.7% using 122/80  -On the basis of age, calculated risk for heart disease or stroke over 20%, and diabetes, the ACC/AHA guidelines suggest a high intensity statin  - consider updated lipid panel and high intensity statin? Thank you,  Kathleen Huitron, PharmD, [address] [facility] Pharmacist  Direct: [phone], Ext [phone]  ==============================================================  CLINICAL PHARMACY: STATIN REVIEW    SUBJECTIVE:   Identified as DM care gap for [employer]: statin therapy. OBJECTIVE:  Allergies   Allergen Reactions    Aspirin Nausea and Vomiting    Cardizem Cd [Diltiazem Hcl] Other (comments)     Dizziness, headache       Medications per current medication list:  Current Outpatient Medications   Medication Sig Dispense Refill    clonazePAM (KlonoPIN) 1 mg tablet Take 1 Tab by mouth two (2) times daily as needed for Anxiety. Max Daily Amount: 2 mg. 30 Tab 1    oxybutynin chloride XL (DITROPAN XL) 10 mg CR tablet TAKE 1 TABLET EVERY DAY 90 Tab 3    glimepiride (AMARYL) 4 mg tablet TAKE 1 TABLET TWICE DAILY 180 Tab 3    azithromycin (ZITHROMAX) 250 mg tablet Take two tablets today then one tablet daily 6 Tab 0    insulin degludec Elwanda Bang FlexTouch U-100) 100 unit/mL (3 mL) inpn 26 Units by SubCUTAneous route Daily (before dinner). Indications: type 2 diabetes mellitus 9 Pen 3    mirtazapine (REMERON) 15 mg tablet TAKE 1 TABLET EVERY NIGHT 90 Tab 5    donepeziL (ARICEPT) 10 mg tablet Take 1 Tab by mouth nightly. 90 Tab 3    apixaban (ELIQUIS) 5 mg tablet Take 1 Tab by mouth two (2) times a day. 180 Tab 3    fluticasone/umeclidin/vilanter (TRELEGY ELLIPTA IN) Take  by inhalation.  venlafaxine-SR (EFFEXOR-XR) 150 mg capsule Take 1 Cap by mouth daily.  90 Cap 3    glucose blood VI test strips (blood glucose test) strip Check three times daily for diabetes mellitus E11.9 300 Strip 3    Blood-Glucose Meter monitoring kit Check three times daily for diabetes mellitus E11.9 1 Kit 0    lancets misc Check 3 times daily for diabetes mellitus E11.9 1 Each 11    metFORMIN (GLUCOPHAGE) 500 mg tablet TAKE 1 TABLET BY MOUTH TWICE DAILY WITH MEALS 180 Tab 3    metoprolol succinate (TOPROL-XL) 50 mg XL tablet Take 1 Tab by mouth daily. 90 Tab 3    Insulin Needles, Disposable, 32 gauge x 5/32\" ndle Use daily with Tresiba 100 Pen Needle 12    acetaminophen (TYLENOL) 500 mg tablet Take 500 mg by mouth every six (6) hours as needed for Pain.  OTHER Diabetic shoes    (E11.40,  Z79.4) Type 2 diabetes mellitus with diabetic neuropathy, with long-term current use of insulin (HCC) 2 Each 0    omega 3-dha-epa-fish oil (FISH OIL) 100-160-1,000 mg cap Take 1 Cap by mouth two (2) times a day.  cholecalciferol (VITAMIN D3) 1,000 unit cap Take 1,000 Units by mouth daily.  Last dose 18          Labs:  Lab Results   Component Value Date/Time    Cholesterol, total 254 (H) 2017 09:13 AM    HDL Cholesterol 63 2017 09:13 AM    LDL, calculated 138 (H) 2017 09:13 AM    VLDL, calculated 53 (H) 2017 09:13 AM    Triglyceride 267 (H) 2017 09:13 AM     ALT (SGPT)   Date Value Ref Range Status   10/10/2019 10 0 - 32 IU/L Final     AST (SGOT)   Date Value Ref Range Status   10/10/2019 13 0 - 40 IU/L Final       ASCVD risk:  WHITE OR     BP Readings from Last 1 Encounters:   20 (!) 170/90       Social History     Tobacco Use    Smoking status: Former Smoker     Packs/day: 1.00     Years: 52.00     Pack years: 52.00     Quit date: 2012     Years since quittin.4    Smokeless tobacco: Never Used   Substance Use Topics    Alcohol use: No     Alcohol/week: 0.0 standard drinks         ASSESSMENT:    ADA Guidelines:    >/= 36years old:   o ASCVD risk calculated to be 56.5% using last BP of 170/90, if I use the BP prior to this which was 122/80 it is 34.7%  o History of ASCVD or 10-year ASCVD risk > 20% - high-intensity statin is recommended.        PLAN:  Updated lipid panel needed but would suggest moderate-high intensity statin     Thank you,    Jaylin Porter, PharmD, [address] [facility] Clinical Pharmacist  Direct: [phone], Ext [phone]

## 2021-01-11 NOTE — TELEPHONE ENCOUNTER
Thank you!     Aminata Gomez, PharmD, [address] [facility] Clinical Pharmacist  Direct: [phone], Ext [phone]  =================================  CLINICAL PHARMACY CONSULT: MED RECONCILIATION/REVIEW ADDENDUM    For Pharmacy Admin Tracking Only    PHSO: PHSO Patient?: Yes  Total # of Interventions Recommended: Count: 1  - New Order #: 1 New Medication Order Reason(s): Needs Additional Medication Therapy  Recommended intervention potential cost savings: 0  Total Interventions Accepted: 0  Time Spent (min): 15    EVA Torres [facility], PharmD  [address]

## 2021-06-10 NOTE — PROGRESS NOTES
Orthopedic Progress Note 2019 Admit Date: 2019 Admit Diagnosis: Fall [W19. Rondi Mater Ankle fracture [G19.163U] Post Op day: 1 Day Post-Op Subjective:  
 
Grover Blades Sedated according to nurse and  Objective:  
 
Vital Signs:   
Temp (24hrs), Av.6 °F (37.6 °C), Min:98.6 °F (37 °C), Max:100 °F (37.8 °C) LAB:   
[unfilled] Lab Results Component Value Date/Time INR 1.0 2017 01:14 PM  
 INR 1.0 2016 07:46 AM  
 
Lab Results Component Value Date/Time HGB 12.2 2019 05:19 AM  
 HGB 12.0 2019 05:01 AM  
 
 
Physical Exam: 
 
Looks ill No neurovascular issues reported No gross problems noted- Splint intact Plan:  
 
Continue PT/OT rehab as indicated Nursing and SS for disposition plans Medical mgmt Signed By: Tashia Patterson MD 
 10-Yogi-2021

## 2021-08-03 PROBLEM — J44.9 COPD (CHRONIC OBSTRUCTIVE PULMONARY DISEASE) (HCC): Status: RESOLVED | Noted: 2019-04-18 | Resolved: 2021-08-03

## 2021-09-29 PROBLEM — L97.529 ULCER OF LEFT FOOT (HCC): Status: ACTIVE | Noted: 2021-09-29

## 2021-11-02 ENCOUNTER — APPOINTMENT (RX ONLY)
Dept: URBAN - METROPOLITAN AREA CLINIC 23 | Facility: CLINIC | Age: 76
Setting detail: DERMATOLOGY
End: 2021-11-02

## 2021-11-02 DIAGNOSIS — L57.8 OTHER SKIN CHANGES DUE TO CHRONIC EXPOSURE TO NONIONIZING RADIATION: ICD-10-CM | Status: STABLE

## 2021-11-02 DIAGNOSIS — D485 NEOPLASM OF UNCERTAIN BEHAVIOR OF SKIN: ICD-10-CM

## 2021-11-02 DIAGNOSIS — Z71.89 OTHER SPECIFIED COUNSELING: ICD-10-CM

## 2021-11-02 DIAGNOSIS — L91.8 OTHER HYPERTROPHIC DISORDERS OF THE SKIN: ICD-10-CM

## 2021-11-02 PROBLEM — D48.5 NEOPLASM OF UNCERTAIN BEHAVIOR OF SKIN: Status: ACTIVE | Noted: 2021-11-02

## 2021-11-02 PROCEDURE — 11306 SHAVE SKIN LESION 0.6-1.0 CM: CPT

## 2021-11-02 PROCEDURE — ? COUNSELING

## 2021-11-02 PROCEDURE — 99203 OFFICE O/P NEW LOW 30 MIN: CPT | Mod: 25

## 2021-11-02 PROCEDURE — ? SHAVE REMOVAL

## 2021-11-02 ASSESSMENT — LOCATION SIMPLE DESCRIPTION DERM
LOCATION SIMPLE: RIGHT FOREHEAD
LOCATION SIMPLE: LEFT ANTERIOR NECK

## 2021-11-02 ASSESSMENT — LOCATION ZONE DERM
LOCATION ZONE: FACE
LOCATION ZONE: NECK

## 2021-11-02 ASSESSMENT — LOCATION DETAILED DESCRIPTION DERM
LOCATION DETAILED: RIGHT MEDIAL FOREHEAD
LOCATION DETAILED: LEFT INFERIOR LATERAL NECK

## 2021-11-02 NOTE — PROCEDURE: SHAVE REMOVAL
Notification Instructions: Patient will be notified of pathology results. However, patient instructed to call the office if not contacted within 2 weeks.
Biopsy Method: Dermablade
Hemostasis: Aluminum Chloride and Electrocautery
Post-Care Instructions: I reviewed with the patient in detail post-care instructions. Patient is to keep the biopsy site dry overnight, and then apply bacitracin twice daily until healed. Patient may apply hydrogen peroxide soaks to remove any crusting.
Accession #: [id number]
Bill For Surgical Tray: no
Medical Necessity Clause: This procedure was medically necessary because the lesion that was treated was:
Anesthesia Type: 1% lidocaine without epinephrine and a 1:10 solution of 8.4% sodium bicarbonate
Billing Type: Third-Party Bill
Was A Bandage Applied: Yes
Anesthesia Volume In Cc: 0.5
Wound Care: Vaseline
X Size Of Lesion In Cm (Optional): 0
Consent was obtained from the patient. The risks and benefits to therapy were discussed in detail. Specifically, the risks of infection, scarring, bleeding, prolonged wound healing, incomplete removal, allergy to anesthesia, nerve injury and recurrence were addressed. Prior to the procedure, the treatment site was clearly identified and confirmed by the patient. All components of Universal Protocol/PAUSE Rule completed.
Medical Necessity Information: It is in your best interest to select a reason for this procedure from the list below. All of these items fulfill various CMS LCD requirements except the new and changing color options.
Detail Level: Detailed
Size Of Lesion In Cm (Required): 0.9

## 2021-11-02 NOTE — HPI: SKIN LESIONS
How Severe Is Your Skin Lesion?: mild
Have Your Skin Lesions Been Treated?: not been treated
Is This A New Presentation, Or A Follow-Up?: Skin Lesion
Additional History: Pt gives verbal consent for biopsy. WILLA Carrasco

## 2021-12-06 ENCOUNTER — APPOINTMENT (RX ONLY)
Dept: URBAN - METROPOLITAN AREA CLINIC 23 | Facility: CLINIC | Age: 76
Setting detail: DERMATOLOGY
End: 2021-12-06

## 2021-12-06 DIAGNOSIS — D18.0 HEMANGIOMA: ICD-10-CM

## 2021-12-06 DIAGNOSIS — L82.1 OTHER SEBORRHEIC KERATOSIS: ICD-10-CM

## 2021-12-06 DIAGNOSIS — L81.4 OTHER MELANIN HYPERPIGMENTATION: ICD-10-CM

## 2021-12-06 DIAGNOSIS — L57.8 OTHER SKIN CHANGES DUE TO CHRONIC EXPOSURE TO NONIONIZING RADIATION: ICD-10-CM

## 2021-12-06 DIAGNOSIS — Z71.89 OTHER SPECIFIED COUNSELING: ICD-10-CM

## 2021-12-06 PROBLEM — D18.01 HEMANGIOMA OF SKIN AND SUBCUTANEOUS TISSUE: Status: ACTIVE | Noted: 2021-12-06

## 2021-12-06 PROCEDURE — 99213 OFFICE O/P EST LOW 20 MIN: CPT

## 2021-12-06 PROCEDURE — ? COUNSELING

## 2021-12-06 ASSESSMENT — LOCATION SIMPLE DESCRIPTION DERM
LOCATION SIMPLE: RIGHT FOREHEAD
LOCATION SIMPLE: LEFT SHOULDER
LOCATION SIMPLE: LOWER BACK
LOCATION SIMPLE: RIGHT SCALP
LOCATION SIMPLE: RIGHT SHOULDER
LOCATION SIMPLE: RIGHT TEMPLE
LOCATION SIMPLE: RIGHT UPPER BACK
LOCATION SIMPLE: ABDOMEN
LOCATION SIMPLE: LEFT UPPER BACK
LOCATION SIMPLE: CHEST

## 2021-12-06 ASSESSMENT — LOCATION DETAILED DESCRIPTION DERM
LOCATION DETAILED: RIGHT ANTERIOR SHOULDER
LOCATION DETAILED: MIDDLE STERNUM
LOCATION DETAILED: SUPERIOR LUMBAR SPINE
LOCATION DETAILED: LEFT ANTERIOR SHOULDER
LOCATION DETAILED: EPIGASTRIC SKIN
LOCATION DETAILED: RIGHT SUPERIOR TEMPLE
LOCATION DETAILED: RIGHT MEDIAL FRONTAL SCALP
LOCATION DETAILED: RIGHT LATERAL ABDOMEN
LOCATION DETAILED: LEFT INFERIOR UPPER BACK
LOCATION DETAILED: LEFT SUPERIOR UPPER BACK
LOCATION DETAILED: RIGHT MEDIAL FOREHEAD
LOCATION DETAILED: LEFT MID-UPPER BACK
LOCATION DETAILED: RIGHT LATERAL SUPERIOR CHEST
LOCATION DETAILED: PERIUMBILICAL SKIN
LOCATION DETAILED: RIGHT SUPERIOR MEDIAL UPPER BACK

## 2021-12-06 ASSESSMENT — LOCATION ZONE DERM
LOCATION ZONE: FACE
LOCATION ZONE: SCALP
LOCATION ZONE: TRUNK
LOCATION ZONE: ARM

## 2021-12-06 NOTE — HPI: FULL BODY SKIN EXAMINATION
What Type Of Note Output Would You Prefer (Optional)?: Standard Output
What Is The Reason For Today's Visit?: Full Body Skin Examination
What Is The Reason For Today's Visit? (Being Monitored For X): concerning skin lesions on an annual basis
How Severe Are Your Spot(S)?: mild
Additional History: Pt gives verbal consent to biopsy. WILLA Carrasco

## 2022-01-01 ENCOUNTER — TELEPHONE (OUTPATIENT)
Dept: FAMILY MEDICINE CLINIC | Facility: CLINIC | Age: 77
End: 2022-01-01

## 2022-03-18 PROBLEM — L97.529 ULCER OF LEFT FOOT (HCC): Status: ACTIVE | Noted: 2021-09-29

## 2022-03-18 PROBLEM — S42.302A CLOSED FRACTURE OF SHAFT OF LEFT HUMERUS: Status: ACTIVE | Noted: 2017-10-02

## 2022-03-19 PROBLEM — R09.02 HYPOXIA: Status: ACTIVE | Noted: 2017-10-02

## 2022-03-19 PROBLEM — G93.41 METABOLIC ENCEPHALOPATHY: Status: ACTIVE | Noted: 2017-10-03

## 2022-03-19 PROBLEM — R00.1 BRADYCARDIA: Status: ACTIVE | Noted: 2017-01-12

## 2022-03-19 PROBLEM — I48.0 PAROXYSMAL ATRIAL FIBRILLATION (HCC): Status: ACTIVE | Noted: 2017-06-06

## 2022-03-19 PROBLEM — S82.899A ANKLE FRACTURE: Status: ACTIVE | Noted: 2019-04-14

## 2022-03-19 PROBLEM — I48.21 PERMANENT ATRIAL FIBRILLATION (HCC): Status: ACTIVE | Noted: 2019-06-20

## 2022-03-19 PROBLEM — Z95.0 PACEMAKER: Status: ACTIVE | Noted: 2017-01-13
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
## 2022-03-19 PROBLEM — J96.12 CHRONIC HYPERCAPNIC RESPIRATORY FAILURE (HCC): Status: ACTIVE | Noted: 2019-04-18

## 2022-03-19 PROBLEM — E66.01 MORBID OBESITY (HCC): Status: ACTIVE | Noted: 2017-10-02

## 2022-03-20 PROBLEM — J96.01 ACUTE HYPOXEMIC RESPIRATORY FAILURE (HCC): Status: ACTIVE | Noted: 2019-04-18

## 2022-03-20 PROBLEM — E11.21 TYPE 2 DIABETES MELLITUS WITH NEPHROPATHY (HCC): Status: ACTIVE | Noted: 2017-12-18

## 2022-03-20 PROBLEM — R06.09 DYSPNEA ON EXERTION: Status: ACTIVE | Noted: 2020-06-03

## 2022-06-11 DIAGNOSIS — F41.9 ANXIETY: Primary | ICD-10-CM

## 2022-06-13 RX ORDER — BLOOD SUGAR DIAGNOSTIC
STRIP MISCELLANEOUS
Qty: 300 STRIP | Refills: 5 | Status: SHIPPED | OUTPATIENT
Start: 2022-06-13

## 2022-06-13 RX ORDER — CLONAZEPAM 1 MG/1
TABLET ORAL
Qty: 30 TABLET | Refills: 5 | Status: SHIPPED | OUTPATIENT
Start: 2022-06-13 | End: 2022-12-10

## 2022-06-13 RX ORDER — BLOOD-GLUCOSE METER
EACH MISCELLANEOUS
Qty: 1 KIT | Refills: 5 | Status: SHIPPED | OUTPATIENT
Start: 2022-06-13

## 2022-06-22 ENCOUNTER — TELEPHONE (OUTPATIENT)
Dept: FAMILY MEDICINE CLINIC | Facility: CLINIC | Age: 77
End: 2022-06-22

## 2022-06-27 RX ORDER — APIXABAN 5 MG/1
TABLET, FILM COATED ORAL
Qty: 180 TABLET | Refills: 3 | Status: SHIPPED | OUTPATIENT
Start: 2022-06-27

## 2022-06-27 RX ORDER — VENLAFAXINE HYDROCHLORIDE 150 MG/1
CAPSULE, EXTENDED RELEASE ORAL
Qty: 90 CAPSULE | Refills: 0 | Status: SHIPPED | OUTPATIENT
Start: 2022-06-27

## 2022-07-19 ENCOUNTER — NURSE ONLY (OUTPATIENT)
Dept: FAMILY MEDICINE CLINIC | Facility: CLINIC | Age: 77
End: 2022-07-19

## 2022-07-19 ENCOUNTER — OFFICE VISIT (OUTPATIENT)
Dept: FAMILY MEDICINE CLINIC | Facility: CLINIC | Age: 77
End: 2022-07-19
Payer: MEDICARE

## 2022-07-19 VITALS
SYSTOLIC BLOOD PRESSURE: 153 MMHG | OXYGEN SATURATION: 94 % | TEMPERATURE: 97.7 F | HEART RATE: 73 BPM | BODY MASS INDEX: 36.65 KG/M2 | RESPIRATION RATE: 16 BRPM | HEIGHT: 70 IN | WEIGHT: 256 LBS | DIASTOLIC BLOOD PRESSURE: 84 MMHG

## 2022-07-19 DIAGNOSIS — Z79.4 TYPE 2 DIABETES MELLITUS WITH HYPERGLYCEMIA, WITH LONG-TERM CURRENT USE OF INSULIN (HCC): ICD-10-CM

## 2022-07-19 DIAGNOSIS — E11.65 TYPE 2 DIABETES MELLITUS WITH HYPERGLYCEMIA, WITH LONG-TERM CURRENT USE OF INSULIN (HCC): ICD-10-CM

## 2022-07-19 DIAGNOSIS — I10 ESSENTIAL HYPERTENSION: ICD-10-CM

## 2022-07-19 DIAGNOSIS — I48.0 PAROXYSMAL ATRIAL FIBRILLATION (HCC): ICD-10-CM

## 2022-07-19 DIAGNOSIS — B37.2 CANDIDAL DERMATITIS: Primary | ICD-10-CM

## 2022-07-19 PROCEDURE — 99214 OFFICE O/P EST MOD 30 MIN: CPT | Performed by: FAMILY MEDICINE

## 2022-07-19 PROCEDURE — 1123F ACP DISCUSS/DSCN MKR DOCD: CPT | Performed by: FAMILY MEDICINE

## 2022-07-19 RX ORDER — FLUCONAZOLE 150 MG/1
150 TABLET ORAL DAILY
Qty: 3 TABLET | Refills: 0 | Status: SHIPPED | OUTPATIENT
Start: 2022-07-19 | End: 2022-07-22

## 2022-07-19 RX ORDER — NYSTATIN 100000 U/G
CREAM TOPICAL
Qty: 30 G | Refills: 2 | Status: SHIPPED | OUTPATIENT
Start: 2022-07-19

## 2022-07-19 RX ORDER — OXYBUTYNIN CHLORIDE 15 MG/1
15 TABLET, EXTENDED RELEASE ORAL DAILY
Qty: 90 TABLET | Refills: 3 | Status: SHIPPED | OUTPATIENT
Start: 2022-07-19

## 2022-07-19 ASSESSMENT — ENCOUNTER SYMPTOMS: BACK PAIN: 1

## 2022-07-19 ASSESSMENT — PATIENT HEALTH QUESTIONNAIRE - PHQ9
SUM OF ALL RESPONSES TO PHQ QUESTIONS 1-9: 0
2. FEELING DOWN, DEPRESSED OR HOPELESS: 0
SUM OF ALL RESPONSES TO PHQ QUESTIONS 1-9: 0
1. LITTLE INTEREST OR PLEASURE IN DOING THINGS: 0
SUM OF ALL RESPONSES TO PHQ9 QUESTIONS 1 & 2: 0
SUM OF ALL RESPONSES TO PHQ QUESTIONS 1-9: 0
SUM OF ALL RESPONSES TO PHQ QUESTIONS 1-9: 0

## 2022-07-19 NOTE — PROGRESS NOTES
Subjective:      Patient ID: Mikael Rodney is a 68 y.o. female. HPI  Is comes in today for follow-up on her diabetes, hypertension, history of atrial fibrillation and labs. Patient states overall she has been doing well but she still struggling the loss of her  who passed away. Blood sugars have been elevated but she admits that she has not been as strict with her diet at times. She has developed a rash under her left breast and left lower abdomen that she has tried several over-the-counter medications including Lotrisone without any improvement. 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     COPD (chronic obstructive pulmonary disease) ([facility] 4/18/2019    DM type 2 (diabetes mellitus, type 2) ([facility]     type 2; A1C= 9.2 in Jan 2018- does not check glucose    DVT (deep venous thrombosis) ([facility]     Essential hypertension 10/21/2016    GERD (gastroesophageal reflux disease) 4/17/2013    chronic    History of non-Hodgkin's lymphoma 12 yrs ago    remission    History of pulmonary embolus (PE) 20+ yrs    had a fx    History of tobacco abuse 52 yrs    quit 1 yr ago    Hyperlipidemia 4/17/2013    Insomnia 7/78/8858    Metabolic encephalopathy 09/0/9277    Obesity (BMI 30-39. 9)     BMI- 33.2 (5/7/18)    Pacemaker     Paroxysmal atrial fibrillation (HCC) 6/6/2017    Permanent atrial fibrillation ([facility] 6/20/2019    Poor historian 05/07/2018    Psychiatric disorder     PUD (peptic ulcer disease) 4/17/2013    Pulmonary embolism (HCC)     Thyroid disease     partial thyroidectomy       Allergies   Allergen Reactions    Diltiazem Hcl Other (See Comments)     Dizziness, headache    Aspirin Nausea And Vomiting       Current Outpatient Medications   Medication Sig Dispense Refill nystatin (MYCOSTATIN) 464832 UNIT/GM cream Apply topically 2 times daily. 30 g 2    fluconazole (DIFLUCAN) 150 MG tablet Take 1 tablet by mouth in the morning for 3 days. 3 tablet 0    oxybutynin (DITROPAN XL) 15 MG extended release tablet Take 1 tablet by mouth in the morning. TAKE 1 TABLET EVERY DAY. 90 tablet 3    mupirocin (BACTROBAN) 2 % ointment APPLY TO AFFECTED AREA TWO TIMES A DAY FOR 7 DAYS. 22 g 5    ELIQUIS 5 MG TABS tablet TAKE 1 TABLET TWICE DAILY 180 tablet 3    venlafaxine (EFFEXOR XR) 150 MG extended release capsule TAKE 1 CAPSULE EVERY DAY 90 capsule 0    clonazePAM (KLONOPIN) 1 MG tablet TAKE 1 TABLET BY MOUTH NIGHTLY AS NEEDED FOR ANXIETY. MAX DAILY AMOUNT: 1 MG. 30 tablet 5    Blood Glucose Monitoring Suppl (ACCU-CHEK GUIDE) w/Device KIT Check 3 times daily for diabetes mellitus E11.9 1 kit 5    blood glucose test strips (ACCU-CHEK GUIDE) strip Check 3 times daily for diabetes mellitus E11.9 300 strip 5    acetaminophen (TYLENOL) 500 MG tablet Take 500 mg by mouth every 6 hours as needed      vitamin D 25 MCG (1000 UT) CAPS Take 1,000 Units by mouth daily      donepezil (ARICEPT) 10 MG tablet TAKE 1 TABLET EVERY NIGHT      glimepiride (AMARYL) 4 MG tablet TAKE 1 TABLET TWICE DAILY      Insulin Degludec (TRESIBA FLEXTOUCH) 100 UNIT/ML SOPN Inject 30 Units into the skin      metFORMIN (GLUCOPHAGE) 500 MG tablet TAKE 1 TABLET TWICE DAILY WITH MEALS      metoprolol succinate (TOPROL XL) 50 MG extended release tablet TAKE 1 TABLET EVERY DAY      mirtazapine (REMERON) 30 MG tablet Take 30 mg by mouth       No current facility-administered medications for this visit.        Social History     Tobacco Use    Smoking status: Former     Packs/day: 1.00     Types: Cigarettes     Quit date: 2012     Years since quittin.9    Smokeless tobacco: Never   Substance Use Topics    Alcohol use: No     Alcohol/week: 0.0 standard drinks    Drug use: No       Review of Systems   Constitutional:  Negative for unexpected weight change. Musculoskeletal:  Positive for arthralgias and back pain. Skin:  Positive for rash. Blood pressure (!) 153/84, pulse 73, temperature 97.7 °F (36.5 °C), temperature source Temporal, resp. rate 16, height 5' 10\" (1.778 m), weight 256 lb (116.1 kg), SpO2 94 %. Objective:   Physical Exam  Vitals reviewed. Constitutional:       General: She is not in acute distress. Appearance: Normal appearance. Cardiovascular:      Rate and Rhythm: Normal rate and regular rhythm. Pulses: Normal pulses. Pulmonary:      Effort: Pulmonary effort is normal.      Breath sounds: Normal breath sounds. Skin:     Comments: Erythematous rash under her left breast consistent with yeast   Neurological:      General: No focal deficit present. Mental Status: She is alert and oriented to person, place, and time. Assessment / Plan:       Mariela Oliver was seen today for diabetes and rash. Diagnoses and all orders for this visit:    Candidal dermatitis  -     nystatin (MYCOSTATIN) 089000 UNIT/GM cream; Apply topically 2 times daily. -     fluconazole (DIFLUCAN) 150 MG tablet; Take 1 tablet by mouth in the morning for 3 days. Type 2 diabetes mellitus with hyperglycemia, with long-term current use of insulin (HCC)  -     Comprehensive Metabolic Panel; Future  -     Hemoglobin A1C; Future    Paroxysmal atrial fibrillation (HCC)  -     TSH; Future    Essential hypertension  -     Comprehensive Metabolic Panel; Future    Other orders  -     oxybutynin (DITROPAN XL) 15 MG extended release tablet; Take 1 tablet by mouth in the morning. TAKE 1 TABLET EVERY DAY. She also reported some increased urinary incontinence and will increase the oxybutynin, treat for candidal dermatitis and repeat some labs and notify her of the results. Follow-up and Dispositions    Return in about 4 months (around 11/19/2022), or if symptoms worsen or fail to improve.          Dictated using voice recognition software.  Proofread, but unrecognized errors may exist.

## 2022-07-20 LAB
ALBUMIN SERPL-MCNC: 3.4 G/DL (ref 3.2–4.6)
ALBUMIN/GLOB SERPL: 0.8 {RATIO} (ref 1.2–3.5)
ALP SERPL-CCNC: 88 U/L (ref 50–136)
ALT SERPL-CCNC: 17 U/L (ref 12–65)
ANION GAP SERPL CALC-SCNC: 7 MMOL/L (ref 7–16)
AST SERPL-CCNC: 16 U/L (ref 15–37)
BILIRUB SERPL-MCNC: 0.2 MG/DL (ref 0.2–1.1)
BUN SERPL-MCNC: 26 MG/DL (ref 8–23)
CALCIUM SERPL-MCNC: 9.9 MG/DL (ref 8.3–10.4)
CHLORIDE SERPL-SCNC: 104 MMOL/L (ref 98–107)
CO2 SERPL-SCNC: 25 MMOL/L (ref 21–32)
CREAT SERPL-MCNC: 1.9 MG/DL (ref 0.6–1)
EST. AVERAGE GLUCOSE BLD GHB EST-MCNC: 278 MG/DL
GLOBULIN SER CALC-MCNC: 4.5 G/DL (ref 2.3–3.5)
GLUCOSE SERPL-MCNC: 226 MG/DL (ref 65–100)
HBA1C MFR BLD: 11.3 % (ref 4.8–5.6)
POTASSIUM SERPL-SCNC: 5.4 MMOL/L (ref 3.5–5.1)
PROT SERPL-MCNC: 7.9 G/DL (ref 6.3–8.2)
SODIUM SERPL-SCNC: 136 MMOL/L (ref 136–145)
TSH, 3RD GENERATION: 2.23 UIU/ML (ref 0.36–3.74)

## 2022-07-21 NOTE — RESULT ENCOUNTER NOTE
Please tell the patient that the hemoglobin A1c was much higher at 11.3 with normal less than 7. Thyroid test is normal.  Blood sugar is high at 226, creatinine is also slightly increased at 1.9 some make sure drinking plenty of fluids. Liver enzymes are good. If your insurance would cover I would like to consider putting her on a weekly shot such as Ozempic try to get her blood sugar average down.   Let me know if she is agreeable and I will send that to the pharmacy

## 2022-07-22 RX ORDER — SEMAGLUTIDE 1.34 MG/ML
0.5 INJECTION, SOLUTION SUBCUTANEOUS WEEKLY
Qty: 3 PEN | Refills: 3 | Status: SHIPPED | OUTPATIENT
Start: 2022-07-22 | End: 2022-07-29 | Stop reason: ALTCHOICE

## 2022-07-29 ENCOUNTER — TELEPHONE (OUTPATIENT)
Dept: FAMILY MEDICINE CLINIC | Facility: CLINIC | Age: 77
End: 2022-07-29

## 2022-07-29 DIAGNOSIS — E11.65 TYPE 2 DIABETES MELLITUS WITH HYPERGLYCEMIA, WITH LONG-TERM CURRENT USE OF INSULIN (HCC): Primary | ICD-10-CM

## 2022-07-29 DIAGNOSIS — Z79.4 TYPE 2 DIABETES MELLITUS WITH HYPERGLYCEMIA, WITH LONG-TERM CURRENT USE OF INSULIN (HCC): Primary | ICD-10-CM

## 2022-07-29 NOTE — TELEPHONE ENCOUNTER
I am sorry that if she could not tolerate the Ozempic before then let us not try that. I think we had also tried Trulicity, see if she remembers any side effects with that.

## 2022-07-29 NOTE — TELEPHONE ENCOUNTER
Patient called stating that [address] [place] [address] delivered a box of Ozempic today and this is the medication she could not take, it made her deathly sick. She wanted to know is she supposed to [facility] and take this or what does she need to do.

## 2022-08-01 NOTE — TELEPHONE ENCOUNTER
Pt is willing to try the Trulicity. Rx to be sent to [address] [place] [address]. Pt stating she can't [place] [facility]

## 2022-08-01 NOTE — TELEPHONE ENCOUNTER
I am sorry that we had the confusion regarding Ozempic, that has been added that to the allergy list to avoid recommending it in the future.   Sent in prescription for:     Requested Prescriptions     Signed Prescriptions Disp Refills    Dulaglutide 0.75 MG/0.5ML SOPN 4 pen 5     Sig: Inject 0.75 mg into the skin once a week     Authorizing Provider: Titus Dickey

## 2022-08-16 ENCOUNTER — TELEPHONE (OUTPATIENT)
Dept: FAMILY MEDICINE CLINIC | Facility: CLINIC | Age: 77
End: 2022-08-16

## 2022-08-16 DIAGNOSIS — F41.9 ANXIETY: Primary | ICD-10-CM

## 2022-08-16 RX ORDER — LORAZEPAM 0.5 MG/1
0.5 TABLET ORAL 3 TIMES DAILY PRN
Qty: 21 TABLET | Refills: 0 | Status: SHIPPED | OUTPATIENT
Start: 2022-08-16 | End: 2022-09-15

## 2022-08-16 NOTE — TELEPHONE ENCOUNTER
Pt called stating that she just got the news that her daughter has cancer. Asking if she can have something called into the pharmacy for her. Last OV 7/19/22.  Next OV 11/21/22

## 2022-08-16 NOTE — TELEPHONE ENCOUNTER
Sent in prescription for:     Requested Prescriptions     Signed Prescriptions Disp Refills    LORazepam (ATIVAN) 0.5 MG tablet 21 tablet 0     Sig: Take 1 tablet by mouth 3 times daily as needed for Anxiety for up to 30 days.      Authorizing Provider: Yanick Gonzalez

## 2022-09-28 RX ORDER — INSULIN DEGLUDEC INJECTION 100 U/ML
INJECTION, SOLUTION SUBCUTANEOUS
Qty: 30 ML | Refills: 5 | Status: SHIPPED | OUTPATIENT
Start: 2022-09-28

## 2022-09-28 RX ORDER — GLIMEPIRIDE 4 MG/1
TABLET ORAL
Qty: 180 TABLET | Refills: 5 | Status: SHIPPED | OUTPATIENT
Start: 2022-09-28

## 2022-10-06 ENCOUNTER — OFFICE VISIT (OUTPATIENT)
Dept: CARDIOLOGY CLINIC | Age: 77
End: 2022-10-06
Payer: MEDICARE

## 2022-10-06 VITALS
SYSTOLIC BLOOD PRESSURE: 167 MMHG | BODY MASS INDEX: 36.08 KG/M2 | WEIGHT: 252 LBS | DIASTOLIC BLOOD PRESSURE: 87 MMHG | HEART RATE: 86 BPM | HEIGHT: 70 IN

## 2022-10-06 DIAGNOSIS — Z95.0 PACEMAKER: ICD-10-CM

## 2022-10-06 DIAGNOSIS — I48.21 PERMANENT ATRIAL FIBRILLATION (HCC): Primary | ICD-10-CM

## 2022-10-06 DIAGNOSIS — I10 ESSENTIAL HYPERTENSION: ICD-10-CM

## 2022-10-06 PROBLEM — N18.4 CKD (CHRONIC KIDNEY DISEASE) STAGE 4, GFR 15-29 ML/MIN (HCC): Status: ACTIVE | Noted: 2022-10-06

## 2022-10-06 PROCEDURE — 1123F ACP DISCUSS/DSCN MKR DOCD: CPT | Performed by: INTERNAL MEDICINE

## 2022-10-06 PROCEDURE — 93000 ELECTROCARDIOGRAM COMPLETE: CPT | Performed by: INTERNAL MEDICINE

## 2022-10-06 PROCEDURE — 1090F PRES/ABSN URINE INCON ASSESS: CPT | Performed by: INTERNAL MEDICINE

## 2022-10-06 PROCEDURE — G8417 CALC BMI ABV UP PARAM F/U: HCPCS | Performed by: INTERNAL MEDICINE

## 2022-10-06 PROCEDURE — 99214 OFFICE O/P EST MOD 30 MIN: CPT | Performed by: INTERNAL MEDICINE

## 2022-10-06 PROCEDURE — 1036F TOBACCO NON-USER: CPT | Performed by: INTERNAL MEDICINE

## 2022-10-06 PROCEDURE — G8484 FLU IMMUNIZE NO ADMIN: HCPCS | Performed by: INTERNAL MEDICINE

## 2022-10-06 PROCEDURE — G8399 PT W/DXA RESULTS DOCUMENT: HCPCS | Performed by: INTERNAL MEDICINE

## 2022-10-06 PROCEDURE — G8427 DOCREV CUR MEDS BY ELIG CLIN: HCPCS | Performed by: INTERNAL MEDICINE

## 2022-10-06 RX ORDER — LOSARTAN POTASSIUM 50 MG/1
50 TABLET ORAL DAILY
Qty: 30 TABLET | Refills: 5 | Status: SHIPPED | OUTPATIENT
Start: 2022-10-06

## 2022-10-06 ASSESSMENT — ENCOUNTER SYMPTOMS
COLOR CHANGE: 0
SPUTUM PRODUCTION: 0
BOWEL INCONTINENCE: 0
ORTHOPNEA: 0
SHORTNESS OF BREATH: 0
ABDOMINAL PAIN: 0
DIARRHEA: 0
WHEEZING: 0
HEMATEMESIS: 0
BLURRED VISION: 0
HEMATOCHEZIA: 0
HOARSE VOICE: 0

## 2022-10-06 NOTE — PROGRESS NOTES
[address], [address]  PHONE: [phone]        10/06/22        NAME:  Mary Lopez  : 1945  MRN: [id number]       SUBJECTIVE:   Mary Lopez is a 68 y.o. female seen for a follow up visit regarding the following: The patient has a hx of AF with AV jameel ablation and primary hypertension. She returns for follow up. CC:Arthritis pain affecting hips and back. Chief Complaint   Patient presents with    Atrial Fibrillation     6 month f/u        HPI:    Atrial Fibrillation  Presents for follow-up visit. Symptoms include hypertension and hypotension. Symptoms are negative for an AICD problem, bradycardia, chest pain, dizziness, hemodynamic instability, pacemaker problem, palpitations, shortness of breath, syncope, tachycardia and weakness. Past Medical History, Past Surgical History, Family history, Social History, and Medications were all reviewed with the patient today and updated as necessary. Current Outpatient Medications:     losartan (COZAAR) 50 MG tablet, Take 1 tablet by mouth daily, Disp: 30 tablet, Rfl: 5    TRESIBA FLEXTOUCH 100 UNIT/ML SOPN, INJECT 30 UNITS SUBCUTANEOUSLY EVERY DAY BEFORE DINNER FOR DIABETES, Disp: 30 mL, Rfl: 5    glimepiride (AMARYL) 4 MG tablet, TAKE 1 TABLET TWICE DAILY, Disp: 180 tablet, Rfl: 5    Dulaglutide 0.75 MG/0.5ML SOPN, Inject 0.75 mg into the skin once a week, Disp: 4 pen, Rfl: 5    nystatin (MYCOSTATIN) 949027 UNIT/GM cream, Apply topically 2 times daily. , Disp: 30 g, Rfl: 2    oxybutynin (DITROPAN XL) 15 MG extended release tablet, Take 1 tablet by mouth in the morning.  TAKE 1 TABLET EVERY DAY., Disp: 90 tablet, Rfl: 3    mupirocin (BACTROBAN) 2 % ointment, APPLY TO AFFECTED AREA TWO TIMES A DAY FOR 7 DAYS., Disp: 22 g, Rfl: 5    ELIQUIS 5 MG TABS tablet, TAKE 1 TABLET TWICE DAILY, Disp: 180 tablet, Rfl: 3    venlafaxine (EFFEXOR XR) 150 MG extended release capsule, TAKE 1 CAPSULE EVERY DAY, Disp: 90 capsule, Rfl: 0    clonazePAM (KLONOPIN) 1 MG tablet, TAKE 1 TABLET BY MOUTH NIGHTLY AS NEEDED FOR ANXIETY.  MAX DAILY AMOUNT: 1 MG., Disp: 30 tablet, Rfl: 5    Blood Glucose Monitoring Suppl (ACCU-CHEK GUIDE) w/Device KIT, Check 3 times daily for diabetes mellitus E11.9, Disp: 1 kit, Rfl: 5    blood glucose test strips (ACCU-CHEK GUIDE) strip, Check 3 times daily for diabetes mellitus E11.9, Disp: 300 strip, Rfl: 5    acetaminophen (TYLENOL) 500 MG tablet, Take 500 mg by mouth every 6 hours as needed, Disp: , Rfl:     vitamin D 25 MCG (1000 UT) CAPS, Take 1,000 Units by mouth daily, Disp: , Rfl:     donepezil (ARICEPT) 10 MG tablet, TAKE 1 TABLET EVERY NIGHT, Disp: , Rfl:     metFORMIN (GLUCOPHAGE) 500 MG tablet, TAKE 1 TABLET TWICE DAILY WITH MEALS, Disp: , Rfl:     metoprolol succinate (TOPROL XL) 50 MG extended release tablet, TAKE 1 TABLET EVERY DAY, Disp: , Rfl:     mirtazapine (REMERON) 30 MG tablet, Take 30 mg by mouth, Disp: , Rfl:   Allergies   Allergen Reactions    Diltiazem Hcl Other (See Comments)     Dizziness, headache    Ozempic (0.25 Or 0.5 Mg-Dose) [Semaglutide(0.25 Or 0.5mg-Dos)] Diarrhea    Aspirin Nausea And Vomiting     Past Medical History:   Diagnosis Date    Anxiety 4/17/2013    Arthritis     Atrial flutter (HCC) 10/21/2016    Bradycardia 1/12/2017    Cancer (HCC)     nonhodkins lymphoma    Chronic bronchitis (HCC) 4/17/2013    CKD (chronic kidney disease) stage 4, GFR 15-29 ml/min ([address] 10/6/2022    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     COPD (chronic obstructive pulmonary disease) ([address] 4/18/2019    DM type 2 (diabetes mellitus, type 2) ([address]     type 2; A1C= 9.2 in Jan 2018- does not check glucose    DVT (deep venous thrombosis) ([address]     Essential hypertension 10/21/2016    GERD (gastroesophageal reflux disease) 4/17/2013    chronic    History of non-Hodgkin's lymphoma 12 yrs ago    remission    History of pulmonary embolus (PE) 20+ yrs    had a fx    History of tobacco abuse 52 yrs    quit 1 yr ago    Hyperlipidemia 4/17/2013    Insomnia 2/26/5839    Metabolic encephalopathy 62/0/1667    Obesity (BMI 30-39. 9)     BMI- 33.2 (5/7/18)    Pacemaker     Paroxysmal atrial fibrillation ([facility] 6/6/2017    Permanent atrial fibrillation ([facility] 6/20/2019    Poor historian 05/07/2018    Psychiatric disorder     PUD (peptic ulcer disease) 4/17/2013    Pulmonary embolism ([facility]     Thyroid disease     partial thyroidectomy     Past Surgical History:   Procedure Laterality Date    APPENDECTOMY      BREAST SURGERY  40 yrs ago    rupture with removal and replacement    CATARACT REMOVAL  2010    bilateral    FRACTURE SURGERY      right arm with pinning and hardware removal    NEUROLOGICAL SURGERY      back surgery    ORTHOPEDIC SURGERY      4/2018  Fractured L Ankle    PACEMAKER      LAVINIA AND BSO (CERVIX REMOVED)      THYROIDECTOMY, PARTIAL  5/00    \"autumn\"    TOTAL COLECTOMY       Family History   Problem Relation Age of Onset    Liver Disease Maternal Uncle     Cancer Sister         breast    Dementia Father         alzheimers    Cancer Mother         colon/stomach      Social History     Tobacco Use    Smoking status: Former     Packs/day: 1.00     Types: Cigarettes     Quit date: 7/27/2012     Years since quitting: 10.2    Smokeless tobacco: Never   Substance Use Topics    Alcohol use: No     Alcohol/week: 0.0 standard drinks       ROS:    Review of Systems   Constitutional: Negative for chills, decreased appetite, diaphoresis, fever and malaise/fatigue. HENT:  Negative for congestion, hearing loss, hoarse voice and nosebleeds. Eyes:  Negative for blurred vision. Cardiovascular:  Negative for chest pain, claudication, cyanosis, dyspnea on exertion, irregular heartbeat, leg swelling, near-syncope, orthopnea, palpitations, paroxysmal nocturnal dyspnea and syncope. Respiratory:  Negative for shortness of breath, sputum production and wheezing.     Endocrine: Negative for polydipsia, polyphagia and polyuria. Skin:  Negative for color change. Musculoskeletal:  Positive for arthritis. Gastrointestinal:  Negative for abdominal pain, bowel incontinence, diarrhea, hematemesis and hematochezia. Genitourinary:  Negative for dysuria, frequency and hematuria. Neurological:  Negative for dizziness, focal weakness, light-headedness, loss of balance, numbness, sensory change and weakness. Psychiatric/Behavioral:  Negative for altered mental status and memory loss. PHYSICAL EXAM:   BP (!) 167/87   Pulse 86 Comment: per EKG  Ht 5' 10\" (1.778 m)   Wt 252 lb (114.3 kg)   BMI 36.16 kg/m²      Physical Exam  Constitutional:       Appearance: Normal appearance. HENT:      Head: Normocephalic and atraumatic. Nose: Nose normal.   Eyes:      Extraocular Movements: Extraocular movements intact. Pupils: Pupils are equal, round, and reactive to light. Neck:      Vascular: No carotid bruit. Cardiovascular:      Rate and Rhythm: Regular rhythm. Pulses: Normal pulses. Heart sounds: No murmur heard. Pulmonary:      Effort: Pulmonary effort is normal.      Breath sounds: Normal breath sounds. Abdominal:      General: Abdomen is flat. Bowel sounds are normal.      Palpations: Abdomen is soft. Musculoskeletal:         General: Normal range of motion. Cervical back: Normal range of motion and neck supple. Skin:     General: Skin is warm and dry. Neurological:      General: No focal deficit present. Mental Status: She is alert and oriented to person, place, and time. Psychiatric:         Mood and Affect: Mood normal.       Medical problems and test results were reviewed with the patient today.          Results for orders placed or performed in visit on 10/06/22   EKG 12 lead    Impression    Atrial fibrillation  - frequent ectopic ventricular beat s   # VECs = 2  Low voltage in precordial leads.    -Poor R-wave progression -may be secondary to pulmonary disease   consider old anterior infarct.    -Nonspecific ST depression   +   Nonspecific T-abnormality  -Nondiagnostic. ABNORMAL        ASSESSMENT and PLAN    Pari Hicks was seen today for atrial fibrillation. Diagnoses and all orders for this visit:    Permanent atrial fibrillation (HCC):Rate is controlled on Toprol. Continue Toprol. Continue Eliquis for [facility] and stroke prevention.  -     EKG 12 lead    Essential hypertension:Elevated above goal.Add ARB especially with diabetes. Continue Toprol.  -     losartan (COZAAR) 50 MG tablet; Take 1 tablet by mouth daily    Pacemaker:Device clinic as scheduled. Disposition:    Return in about 3 months (around 1/6/2023) for Follow up after Med change.                 Fernando Burton MD  10/6/2022  1:24 PM

## 2022-10-07 RX ORDER — MIRTAZAPINE 30 MG/1
TABLET, FILM COATED ORAL
Qty: 90 TABLET | Refills: 0 | Status: SHIPPED | OUTPATIENT
Start: 2022-10-07

## 2022-10-07 RX ORDER — DONEPEZIL HYDROCHLORIDE 10 MG/1
TABLET, FILM COATED ORAL
Qty: 90 TABLET | Refills: 0 | Status: SHIPPED | OUTPATIENT
Start: 2022-10-07

## 2022-10-28 ENCOUNTER — TELEPHONE (OUTPATIENT)
Dept: FAMILY MEDICINE CLINIC | Facility: CLINIC | Age: 77
End: 2022-10-28

## 2022-10-28 NOTE — TELEPHONE ENCOUNTER
Patient called stating she received Ozempic and she has not taken this in years. Wanting to know how to get rid of it or if she can bring here to dispose. Left voicemail for patient if she brings medication to office we can dispose of this medicine for her.

## 2022-11-04 PROCEDURE — 93296 REM INTERROG EVL PM/IDS: CPT | Performed by: INTERNAL MEDICINE

## 2022-11-04 PROCEDURE — 93294 REM INTERROG EVL PM/LDLS PM: CPT | Performed by: INTERNAL MEDICINE

## 2022-11-18 RX ORDER — VENLAFAXINE HYDROCHLORIDE 150 MG/1
CAPSULE, EXTENDED RELEASE ORAL
Qty: 90 CAPSULE | Refills: 0 | Status: SHIPPED | OUTPATIENT
Start: 2022-11-18

## 2022-11-21 ENCOUNTER — OFFICE VISIT (OUTPATIENT)
Dept: FAMILY MEDICINE CLINIC | Facility: CLINIC | Age: 77
End: 2022-11-21
Payer: MEDICARE

## 2022-11-21 ENCOUNTER — NURSE ONLY (OUTPATIENT)
Dept: FAMILY MEDICINE CLINIC | Facility: CLINIC | Age: 77
End: 2022-11-21

## 2022-11-21 VITALS
HEART RATE: 69 BPM | TEMPERATURE: 98 F | SYSTOLIC BLOOD PRESSURE: 144 MMHG | HEIGHT: 70 IN | BODY MASS INDEX: 35.85 KG/M2 | DIASTOLIC BLOOD PRESSURE: 75 MMHG | RESPIRATION RATE: 16 BRPM | OXYGEN SATURATION: 94 % | WEIGHT: 250.4 LBS

## 2022-11-21 DIAGNOSIS — F41.9 ANXIETY: ICD-10-CM

## 2022-11-21 DIAGNOSIS — E11.65 TYPE 2 DIABETES MELLITUS WITH HYPERGLYCEMIA, WITH LONG-TERM CURRENT USE OF INSULIN (HCC): Primary | ICD-10-CM

## 2022-11-21 DIAGNOSIS — Z91.81 AT HIGH RISK FOR FALLS: ICD-10-CM

## 2022-11-21 DIAGNOSIS — I10 ESSENTIAL HYPERTENSION: ICD-10-CM

## 2022-11-21 DIAGNOSIS — Z23 NEED FOR IMMUNIZATION AGAINST INFLUENZA: ICD-10-CM

## 2022-11-21 DIAGNOSIS — E11.65 TYPE 2 DIABETES MELLITUS WITH HYPERGLYCEMIA, WITH LONG-TERM CURRENT USE OF INSULIN (HCC): ICD-10-CM

## 2022-11-21 DIAGNOSIS — Z79.4 TYPE 2 DIABETES MELLITUS WITH HYPERGLYCEMIA, WITH LONG-TERM CURRENT USE OF INSULIN (HCC): Primary | ICD-10-CM

## 2022-11-21 DIAGNOSIS — I48.0 PAROXYSMAL ATRIAL FIBRILLATION (HCC): ICD-10-CM

## 2022-11-21 DIAGNOSIS — Z79.4 TYPE 2 DIABETES MELLITUS WITH HYPERGLYCEMIA, WITH LONG-TERM CURRENT USE OF INSULIN (HCC): ICD-10-CM

## 2022-11-21 LAB
EST. AVERAGE GLUCOSE BLD GHB EST-MCNC: 255 MG/DL
HBA1C MFR BLD: 10.5 % (ref 4.8–5.6)

## 2022-11-21 PROCEDURE — G8399 PT W/DXA RESULTS DOCUMENT: HCPCS | Performed by: FAMILY MEDICINE

## 2022-11-21 PROCEDURE — G8417 CALC BMI ABV UP PARAM F/U: HCPCS | Performed by: FAMILY MEDICINE

## 2022-11-21 PROCEDURE — G0008 ADMIN INFLUENZA VIRUS VAC: HCPCS | Performed by: FAMILY MEDICINE

## 2022-11-21 PROCEDURE — G8427 DOCREV CUR MEDS BY ELIG CLIN: HCPCS | Performed by: FAMILY MEDICINE

## 2022-11-21 PROCEDURE — 90694 VACC AIIV4 NO PRSRV 0.5ML IM: CPT | Performed by: FAMILY MEDICINE

## 2022-11-21 PROCEDURE — G8484 FLU IMMUNIZE NO ADMIN: HCPCS | Performed by: FAMILY MEDICINE

## 2022-11-21 PROCEDURE — 1036F TOBACCO NON-USER: CPT | Performed by: FAMILY MEDICINE

## 2022-11-21 PROCEDURE — 3046F HEMOGLOBIN A1C LEVEL >9.0%: CPT | Performed by: FAMILY MEDICINE

## 2022-11-21 PROCEDURE — 99213 OFFICE O/P EST LOW 20 MIN: CPT | Performed by: FAMILY MEDICINE

## 2022-11-21 PROCEDURE — 3078F DIAST BP <80 MM HG: CPT | Performed by: FAMILY MEDICINE

## 2022-11-21 PROCEDURE — 3074F SYST BP LT 130 MM HG: CPT | Performed by: FAMILY MEDICINE

## 2022-11-21 PROCEDURE — 1090F PRES/ABSN URINE INCON ASSESS: CPT | Performed by: FAMILY MEDICINE

## 2022-11-21 PROCEDURE — 1123F ACP DISCUSS/DSCN MKR DOCD: CPT | Performed by: FAMILY MEDICINE

## 2022-11-21 RX ORDER — SEMAGLUTIDE 1.34 MG/ML
INJECTION, SOLUTION SUBCUTANEOUS
COMMUNITY
Start: 2022-10-26 | End: 2022-11-21 | Stop reason: ALTCHOICE

## 2022-11-21 RX ORDER — CLONAZEPAM 2 MG/1
2 TABLET ORAL NIGHTLY PRN
Qty: 30 TABLET | Refills: 5 | Status: SHIPPED | OUTPATIENT
Start: 2022-11-21 | End: 2023-05-20

## 2022-11-21 ASSESSMENT — PATIENT HEALTH QUESTIONNAIRE - PHQ9
SUM OF ALL RESPONSES TO PHQ QUESTIONS 1-9: 2
SUM OF ALL RESPONSES TO PHQ9 QUESTIONS 1 & 2: 2
1. LITTLE INTEREST OR PLEASURE IN DOING THINGS: 1
2. FEELING DOWN, DEPRESSED OR HOPELESS: 1
SUM OF ALL RESPONSES TO PHQ QUESTIONS 1-9: 2

## 2022-11-21 ASSESSMENT — ENCOUNTER SYMPTOMS: BACK PAIN: 1

## 2022-11-21 NOTE — PROGRESS NOTES
Subjective:      Patient ID: Jac Magallanes is a 68 y.o. female. HPI  Patient comes in today for follow-up on her blood pressure, diabetes, labs. She has been very upset about daughter who  recently from brain cancer. Fasting glucose readings have been elevated up around 200. Taking 30 units of insulin every evening. She has been struggling to sleep at night taking the Klonopin 1 mg in the evening. 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 9)     BMI- 33.2 (18)    Pacemaker     Paroxysmal atrial fibrillation (HCC) 2017    Permanent atrial fibrillation ([address] 2019    Poor historian 2018    Psychiatric disorder     PUD (peptic ulcer disease) 2013    Pulmonary embolism (HCC)     Thyroid disease     partial thyroidectomy       Allergies   Allergen Reactions    Diltiazem Hcl Other (See Comments)     Dizziness, headache    Ozempic (0.25 Or 0.5 Mg-Dose) [Semaglutide(0.25 Or 0.5mg-Dos)] Diarrhea    Aspirin Nausea And Vomiting       Current Outpatient Medications   Medication Sig Dispense Refill venlafaxine (EFFEXOR XR) 150 MG extended release capsule TAKE 1 CAPSULE EVERY DAY 90 capsule 0    donepezil (ARICEPT) 10 MG tablet TAKE 1 TABLET EVERY NIGHT 90 tablet 0    mirtazapine (REMERON) 30 MG tablet TAKE 1 TABLET EVERY NIGHT 90 tablet 0    losartan (COZAAR) 50 MG tablet Take 1 tablet by mouth daily (Patient taking differently: Take 50 mg by mouth daily 1/2 tab daily) 30 tablet 5    glimepiride (AMARYL) 4 MG tablet TAKE 1 TABLET TWICE DAILY 180 tablet 5    Dulaglutide 0.75 MG/0.5ML SOPN Inject 0.75 mg into the skin once a week 4 pen 5    nystatin (MYCOSTATIN) 650260 UNIT/GM cream Apply topically 2 times daily. 30 g 2    oxybutynin (DITROPAN XL) 15 MG extended release tablet Take 1 tablet by mouth in the morning. TAKE 1 TABLET EVERY DAY. 90 tablet 3    mupirocin (BACTROBAN) 2 % ointment APPLY TO AFFECTED AREA TWO TIMES A DAY FOR 7 DAYS. 22 g 5    ELIQUIS 5 MG TABS tablet TAKE 1 TABLET TWICE DAILY 180 tablet 3    clonazePAM (KLONOPIN) 1 MG tablet TAKE 1 TABLET BY MOUTH NIGHTLY AS NEEDED FOR ANXIETY. MAX DAILY AMOUNT: 1 MG. 30 tablet 5    Blood Glucose Monitoring Suppl (ACCU-CHEK GUIDE) w/Device KIT Check 3 times daily for diabetes mellitus E11.9 1 kit 5    blood glucose test strips (ACCU-CHEK GUIDE) strip Check 3 times daily for diabetes mellitus E11.9 300 strip 5    acetaminophen (TYLENOL) 500 MG tablet Take 500 mg by mouth every 6 hours as needed      vitamin D 25 MCG (1000 UT) CAPS Take 1,000 Units by mouth daily      metFORMIN (GLUCOPHAGE) 500 MG tablet TAKE 1 TABLET TWICE DAILY WITH MEALS      metoprolol succinate (TOPROL XL) 50 MG extended release tablet TAKE 1 TABLET EVERY DAY       No current facility-administered medications for this visit.        Social History     Tobacco Use    Smoking status: Former     Packs/day: 1.00     Types: Cigarettes     Quit date: 7/27/2012     Years since quitting: 10.3    Smokeless tobacco: Never   Substance Use Topics    Alcohol use: No     Alcohol/week: 0.0 standard drinks    Drug use: No     Review of Systems   Cardiovascular:  Negative for chest pain and palpitations. Musculoskeletal:  Positive for back pain. Psychiatric/Behavioral:  Positive for sleep disturbance. The patient is nervous/anxious. Blood pressure (!) 144/75, pulse 69, temperature 98 °F (36.7 °C), temperature source Temporal, resp. rate 16, height 5' 10\" (1.778 m), weight 250 lb 6.4 oz (113.6 kg), SpO2 94 %. Objective:   Physical Exam  Vitals reviewed. Constitutional:       General: She is not in acute distress. Appearance: Normal appearance. Cardiovascular:      Rate and Rhythm: Normal rate and regular rhythm. Pulses: Normal pulses. Pulmonary:      Effort: Pulmonary effort is normal.      Breath sounds: Normal breath sounds. Neurological:      General: No focal deficit present. Mental Status: She is alert and oriented to person, place, and time. Assessment / Plan:       Alfonso Cantrell was seen today for diabetes, anxiety and hypertension. Diagnoses and all orders for this visit:    Type 2 diabetes mellitus with hyperglycemia, with long-term current use of insulin (HCC)  -     Hemoglobin A1C; Future    Need for immunization against influenza  -     Influenza, FLUAD, (age 72 y+), IM, Preservative Free, 0.5 mL    Anxiety  -     clonazePAM (KLONOPIN) 2 MG tablet; Take 1 tablet by mouth nightly as needed for Anxiety for up to 180 days. Essential hypertension  -     Basic Metabolic Panel; Future    Paroxysmal atrial fibrillation (HCC)    At high risk for falls     Will notify patient of test results. Continue usual Rollator which she has let me know if any symptoms worsen. Follow-up and Dispositions    Return in about 3 months (around 2/21/2023), or if symptoms worsen or fail to improve. Dictated using voice recognition software.  Proofread, but unrecognized errors may exist.        On the basis of positive falls risk screening, assessment and plan is as follows: home safety tips provided, patient declines any further evaluation/treatment for increased falls risk.

## 2022-11-22 ENCOUNTER — TELEPHONE (OUTPATIENT)
Dept: FAMILY MEDICINE CLINIC | Facility: CLINIC | Age: 77
End: 2022-11-22

## 2022-11-22 ENCOUNTER — NURSE ONLY (OUTPATIENT)
Dept: FAMILY MEDICINE CLINIC | Facility: CLINIC | Age: 77
End: 2022-11-22

## 2022-11-22 DIAGNOSIS — Z95.0 PACEMAKER: ICD-10-CM

## 2022-11-22 DIAGNOSIS — R00.1 BRADYCARDIA: Primary | ICD-10-CM

## 2022-11-22 DIAGNOSIS — R00.1 BRADYCARDIA: ICD-10-CM

## 2022-11-22 DIAGNOSIS — E87.5 HYPERKALEMIA: Primary | ICD-10-CM

## 2022-11-22 DIAGNOSIS — E87.5 HYPERKALEMIA: ICD-10-CM

## 2022-11-22 LAB
ANION GAP SERPL CALC-SCNC: 1 MMOL/L (ref 2–11)
ANION GAP SERPL CALC-SCNC: 4 MMOL/L (ref 2–11)
BUN SERPL-MCNC: 35 MG/DL (ref 8–23)
BUN SERPL-MCNC: 37 MG/DL (ref 8–23)
CALCIUM SERPL-MCNC: 10.2 MG/DL (ref 8.3–10.4)
CALCIUM SERPL-MCNC: 10.2 MG/DL (ref 8.3–10.4)
CHLORIDE SERPL-SCNC: 102 MMOL/L (ref 101–110)
CHLORIDE SERPL-SCNC: 102 MMOL/L (ref 101–110)
CO2 SERPL-SCNC: 30 MMOL/L (ref 21–32)
CO2 SERPL-SCNC: 31 MMOL/L (ref 21–32)
CREAT SERPL-MCNC: 2 MG/DL (ref 0.6–1)
CREAT SERPL-MCNC: 2.1 MG/DL (ref 0.6–1)
GLUCOSE SERPL-MCNC: 201 MG/DL (ref 65–100)
GLUCOSE SERPL-MCNC: 226 MG/DL (ref 65–100)
POTASSIUM SERPL-SCNC: 5.9 MMOL/L (ref 3.5–5.1)
POTASSIUM SERPL-SCNC: 6.1 MMOL/L (ref 3.5–5.1)
SODIUM SERPL-SCNC: 134 MMOL/L (ref 133–143)
SODIUM SERPL-SCNC: 136 MMOL/L (ref 133–143)

## 2022-11-22 NOTE — TELEPHONE ENCOUNTER
Please ask the patient to come by for repeat potassium due to it being elevated yesterday. Make sure she isnt taking any additional potassium.    Will put in order for BMP

## 2022-11-22 NOTE — TELEPHONE ENCOUNTER
Pt informed of abn labs  Potassium. Pt to come in for redraw of labs. Pt is not taking any additional potassium.

## 2022-11-23 ENCOUNTER — TELEPHONE (OUTPATIENT)
Dept: FAMILY MEDICINE CLINIC | Facility: CLINIC | Age: 77
End: 2022-11-23

## 2022-11-23 DIAGNOSIS — Z79.4 TYPE 2 DIABETES MELLITUS WITH HYPERGLYCEMIA, WITH LONG-TERM CURRENT USE OF INSULIN (HCC): Primary | ICD-10-CM

## 2022-11-23 DIAGNOSIS — E11.65 TYPE 2 DIABETES MELLITUS WITH HYPERGLYCEMIA, WITH LONG-TERM CURRENT USE OF INSULIN (HCC): Primary | ICD-10-CM

## 2022-11-23 RX ORDER — INSULIN DEGLUDEC 200 U/ML
30 INJECTION, SOLUTION SUBCUTANEOUS DAILY
Qty: 9 ADJUSTABLE DOSE PRE-FILLED PEN SYRINGE | Refills: 12 | Status: SHIPPED | OUTPATIENT
Start: 2022-11-23

## 2022-11-23 NOTE — TELEPHONE ENCOUNTER
Spoke to the patient, pt states she was still taking Metformin AND 30 units of insulin.  Informed the patient of Dr. Joanie Roman instructions and fup scheduled

## 2022-11-23 NOTE — TELEPHONE ENCOUNTER
Please tell the patient that potassium still elevated 5.9 but creatinine is also increased at 2 with normal less that 1.5. We may need to stop the metformin if still taking and I had down at one time taking Tresiba insulin. I am unsure based on updated medicine list earlier this week if she is still taking [place] or not. The HbA1c was high 10.5 with goal less than 7. Would stop the metformin since Kidney function worsened and monitor fasting glucose readings,  Would like to increase the Tresiba 2 units every 2 days until fasting glucose readings less than 140. I had down she was taking 30 units, if not currently taking insulin start at 20 units and work up from there. If taking another amount of insulin daily just start from that number of units. Increasing the insulin should offset the metformin being stopped and insulin can help lower potassium. Schedule follow up with me in 2 weeks.   Sent in prescription for:     Requested Prescriptions     Signed Prescriptions Disp Refills    Insulin Degludec (TRESIBA FLEXTOUCH) 200 UNIT/ML SOPN 9 Adjustable Dose Pre-filled Pen Syringe 12     Sig: Inject 30 Units into the skin daily     Authorizing Provider: Theopolis Osler    Insulin Pen Needle 31G X 5 MM MISC 100 each 3     Si each by Does not apply route daily     Authorizing Provider: Theopolis Osler

## 2022-12-06 ENCOUNTER — OFFICE VISIT (OUTPATIENT)
Dept: FAMILY MEDICINE CLINIC | Facility: CLINIC | Age: 77
End: 2022-12-06
Payer: MEDICARE

## 2022-12-06 VITALS
RESPIRATION RATE: 16 BRPM | WEIGHT: 248 LBS | OXYGEN SATURATION: 92 % | TEMPERATURE: 97.8 F | BODY MASS INDEX: 35.5 KG/M2 | HEART RATE: 72 BPM | HEIGHT: 70 IN | DIASTOLIC BLOOD PRESSURE: 51 MMHG | SYSTOLIC BLOOD PRESSURE: 97 MMHG

## 2022-12-06 DIAGNOSIS — S81.811D SKIN TEAR OF LOWER LEG WITHOUT COMPLICATION, RIGHT, SUBSEQUENT ENCOUNTER: ICD-10-CM

## 2022-12-06 DIAGNOSIS — E87.5 HYPERKALEMIA: ICD-10-CM

## 2022-12-06 DIAGNOSIS — R07.81 RIB PAIN ON LEFT SIDE: Primary | ICD-10-CM

## 2022-12-06 PROCEDURE — 1036F TOBACCO NON-USER: CPT | Performed by: FAMILY MEDICINE

## 2022-12-06 PROCEDURE — G8427 DOCREV CUR MEDS BY ELIG CLIN: HCPCS | Performed by: FAMILY MEDICINE

## 2022-12-06 PROCEDURE — G8417 CALC BMI ABV UP PARAM F/U: HCPCS | Performed by: FAMILY MEDICINE

## 2022-12-06 PROCEDURE — 99213 OFFICE O/P EST LOW 20 MIN: CPT | Performed by: FAMILY MEDICINE

## 2022-12-06 PROCEDURE — G8484 FLU IMMUNIZE NO ADMIN: HCPCS | Performed by: FAMILY MEDICINE

## 2022-12-06 PROCEDURE — 1090F PRES/ABSN URINE INCON ASSESS: CPT | Performed by: FAMILY MEDICINE

## 2022-12-06 PROCEDURE — G8399 PT W/DXA RESULTS DOCUMENT: HCPCS | Performed by: FAMILY MEDICINE

## 2022-12-06 PROCEDURE — 3078F DIAST BP <80 MM HG: CPT | Performed by: FAMILY MEDICINE

## 2022-12-06 PROCEDURE — 1123F ACP DISCUSS/DSCN MKR DOCD: CPT | Performed by: FAMILY MEDICINE

## 2022-12-06 PROCEDURE — 3074F SYST BP LT 130 MM HG: CPT | Performed by: FAMILY MEDICINE

## 2022-12-06 RX ORDER — CEPHALEXIN 500 MG/1
500 CAPSULE ORAL 3 TIMES DAILY
Qty: 21 CAPSULE | Refills: 0 | Status: SHIPPED | OUTPATIENT
Start: 2022-12-06 | End: 2022-12-13

## 2022-12-06 RX ORDER — TRAMADOL HYDROCHLORIDE 50 MG/1
50 TABLET ORAL EVERY 6 HOURS PRN
Qty: 60 TABLET | Refills: 0 | Status: SHIPPED | OUTPATIENT
Start: 2022-12-06 | End: 2022-12-21

## 2022-12-06 ASSESSMENT — PATIENT HEALTH QUESTIONNAIRE - PHQ9
SUM OF ALL RESPONSES TO PHQ9 QUESTIONS 1 & 2: 2
SUM OF ALL RESPONSES TO PHQ QUESTIONS 1-9: 2
2. FEELING DOWN, DEPRESSED OR HOPELESS: 1
SUM OF ALL RESPONSES TO PHQ QUESTIONS 1-9: 2
1. LITTLE INTEREST OR PLEASURE IN DOING THINGS: 1
SUM OF ALL RESPONSES TO PHQ QUESTIONS 1-9: 2
SUM OF ALL RESPONSES TO PHQ QUESTIONS 1-9: 2

## 2022-12-07 LAB
ANION GAP SERPL CALC-SCNC: 5 MMOL/L (ref 2–11)
BUN SERPL-MCNC: 35 MG/DL (ref 8–23)
CALCIUM SERPL-MCNC: 9.9 MG/DL (ref 8.3–10.4)
CHLORIDE SERPL-SCNC: 102 MMOL/L (ref 101–110)
CO2 SERPL-SCNC: 26 MMOL/L (ref 21–32)
CREAT SERPL-MCNC: 2.1 MG/DL (ref 0.6–1)
GLUCOSE SERPL-MCNC: 321 MG/DL (ref 65–100)
POTASSIUM SERPL-SCNC: 5.7 MMOL/L (ref 3.5–5.1)
SODIUM SERPL-SCNC: 133 MMOL/L (ref 133–143)

## 2022-12-07 NOTE — PROGRESS NOTES
Subjective:      Patient ID: Queenie Malin is a 68 y.o. female. HPI  Patient comes in today for follow-up on her blood pressure, renal insufficiency and hyperkalemia. Unfortunately she had a fall as she rolled out of her bed 2 days ago suffering a skin tear over her right knee and left knee along with her left arm and has had some pain along her left ribs but no difficulty with breathing although it does hurt with deep inspiration. She recently had been put on losartan by cardiology and cut it down to half a tablet daily because her blood pressure had been low and she was feeling somewhat dizzy. She is felt a little better since she cut it down to about half a tablet but still feels very tired. She has increased her insulin to 32 units daily and her blood sugars are running in the 150s fasting by her report but recent hemoglobin A1c was over 10. 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     COPD (chronic obstructive pulmonary disease) ([address] 4/18/2019    DM type 2 (diabetes mellitus, type 2) ([address]     type 2; A1C= 9.2 in Jan 2018- does not check glucose    DVT (deep venous thrombosis) ([address]     Essential hypertension 10/21/2016    GERD (gastroesophageal reflux disease) 4/17/2013    chronic    History of non-Hodgkin's lymphoma 12 yrs ago    remission    History of pulmonary embolus (PE) 20+ yrs    had a fx    History of tobacco abuse 52 yrs    quit 1 yr ago    Hyperlipidemia 4/17/2013    Insomnia 9/64/2245    Metabolic encephalopathy 27/7/0174    Obesity (BMI 30-39. 9)     BMI- 33.2 (5/7/18)    Pacemaker     Paroxysmal atrial fibrillation ([address] 6/6/2017    Permanent atrial fibrillation ([address] 6/20/2019    Poor historian 05/07/2018    Psychiatric disorder     PUD (peptic ulcer disease) 4/17/2013    Pulmonary embolism (HCC)     Thyroid disease     partial thyroidectomy       Allergies   Allergen Reactions    Diltiazem Hcl Other (See Comments)     Dizziness, headache    Ozempic (0.25 Or 0.5 Mg-Dose) [Semaglutide(0.25 Or 0.5mg-Dos)] Diarrhea    Aspirin Nausea And Vomiting       Current Outpatient Medications   Medication Sig Dispense Refill    traMADol (ULTRAM) 50 MG tablet Take 1 tablet by mouth every 6 hours as needed for Pain for up to 15 days. Intended supply: 5 days. Take lowest dose possible to manage pain 60 tablet 0    cephALEXin (KEFLEX) 500 MG capsule Take 1 capsule by mouth 3 times daily for 7 days 21 capsule 0    Insulin Degludec (TRESIBA FLEXTOUCH) 200 UNIT/ML SOPN Inject 30 Units into the skin daily 9 Adjustable Dose Pre-filled Pen Syringe 12    Insulin Pen Needle 31G X 5 MM MISC 1 each by Does not apply route daily 100 each 3    clonazePAM (KLONOPIN) 2 MG tablet Take 1 tablet by mouth nightly as needed for Anxiety for up to 180 days. 30 tablet 5    venlafaxine (EFFEXOR XR) 150 MG extended release capsule TAKE 1 CAPSULE EVERY DAY 90 capsule 0    donepezil (ARICEPT) 10 MG tablet TAKE 1 TABLET EVERY NIGHT 90 tablet 0    mirtazapine (REMERON) 30 MG tablet TAKE 1 TABLET EVERY NIGHT 90 tablet 0    glimepiride (AMARYL) 4 MG tablet TAKE 1 TABLET TWICE DAILY 180 tablet 5    nystatin (MYCOSTATIN) 105550 UNIT/GM cream Apply topically 2 times daily. 30 g 2    oxybutynin (DITROPAN XL) 15 MG extended release tablet Take 1 tablet by mouth in the morning. TAKE 1 TABLET EVERY DAY. 90 tablet 3    mupirocin (BACTROBAN) 2 % ointment APPLY TO AFFECTED AREA TWO TIMES A DAY FOR 7 DAYS.  22 g 5    ELIQUIS 5 MG TABS tablet TAKE 1 TABLET TWICE DAILY 180 tablet 3    Blood Glucose Monitoring Suppl (ACCU-CHEK GUIDE) w/Device KIT Check 3 times daily for diabetes mellitus E11.9 1 kit 5    blood glucose test strips (ACCU-CHEK GUIDE) strip Check 3 times daily for diabetes mellitus E11.9 300 strip 5    acetaminophen (TYLENOL) 500 MG tablet Take 500 mg by mouth every 6 hours as needed      vitamin D 25 MCG (1000 UT) CAPS Take 1,000 Units by mouth daily      metFORMIN (GLUCOPHAGE) 500 MG tablet TAKE 1 TABLET TWICE DAILY WITH MEALS      metoprolol succinate (TOPROL XL) 50 MG extended release tablet TAKE 1 TABLET EVERY DAY       No current facility-administered medications for this visit. Social History     Tobacco Use    Smoking status: Former     Packs/day: 1.00     Types: Cigarettes     Quit date: 7/27/2012     Years since quitting: 10.3    Smokeless tobacco: Never   Substance Use Topics    Alcohol use: No     Alcohol/week: 0.0 standard drinks    Drug use: No     Review of Systems   Constitutional:  Positive for fatigue. Musculoskeletal:  Positive for arthralgias. Neurological:  Positive for dizziness. Blood pressure (!) 97/51, pulse 72, temperature 97.8 °F (36.6 °C), temperature source Temporal, resp. rate 16, height 5' 10\" (1.778 m), weight 248 lb (112.5 kg), SpO2 92 %. Objective:   Physical Exam  Vitals reviewed. Constitutional:       General: She is not in acute distress. Appearance: Normal appearance. Cardiovascular:      Rate and Rhythm: Normal rate and regular rhythm. Pulses: Normal pulses. Comments: Tender along the left chest wall  Pulmonary:      Effort: Pulmonary effort is normal.      Breath sounds: Normal breath sounds. Skin:     Comments: Skin tear noted over the right anterior knee no purulent drainage or significant erythema at this time   Neurological:      General: No focal deficit present. Mental Status: She is alert and oriented to person, place, and time. Assessment / Plan:    Eduar Chaudhari was seen today for abnormal lab, fall, rib injury and knee injury. Diagnoses and all orders for this visit:    Rib pain on left side  -     traMADol (ULTRAM) 50 MG tablet;  Take 1 tablet by mouth every 6 hours as needed for Pain for up to 15 days. Intended supply: 5 days. Take lowest dose possible to manage pain    Skin tear of lower leg without complication, right, subsequent encounter  -     cephALEXin (KEFLEX) 500 MG capsule; Take 1 capsule by mouth 3 times daily for 7 days    Hyperkalemia  -     Basic Metabolic Panel; Future  -     Basic Metabolic Panel       We will give tramadol for pain and put on antibiotics because of the skin tears. We will stop the losartan at this time because of the hypotension and follow-up in a few weeks. Will notify patient of test results. Follow-up and Dispositions    Return in about 3 weeks (around 12/27/2022), or if symptoms worsen or fail to improve. Dictated using voice recognition software.  Proofread, but unrecognized errors may exist.

## 2022-12-07 NOTE — RESULT ENCOUNTER NOTE
Please tell patient that potassium is 5.7 so it has improved, blood sugar still elevated at 321 so I would increase her insulin by 4 more units to get that under better control, creatinine appears stable at 2.1, plan to follow-up on this again in a few weeks we will recheck the patient back and hopefully that will improve

## 2022-12-12 ENCOUNTER — APPOINTMENT (RX ONLY)
Dept: URBAN - METROPOLITAN AREA CLINIC 23 | Facility: CLINIC | Age: 77
Setting detail: DERMATOLOGY
End: 2022-12-12

## 2022-12-12 DIAGNOSIS — L82.1 OTHER SEBORRHEIC KERATOSIS: ICD-10-CM

## 2022-12-12 DIAGNOSIS — L57.8 OTHER SKIN CHANGES DUE TO CHRONIC EXPOSURE TO NONIONIZING RADIATION: ICD-10-CM

## 2022-12-12 DIAGNOSIS — D18.0 HEMANGIOMA: ICD-10-CM

## 2022-12-12 DIAGNOSIS — D485 NEOPLASM OF UNCERTAIN BEHAVIOR OF SKIN: ICD-10-CM

## 2022-12-12 DIAGNOSIS — L81.4 OTHER MELANIN HYPERPIGMENTATION: ICD-10-CM

## 2022-12-12 PROBLEM — D18.01 HEMANGIOMA OF SKIN AND SUBCUTANEOUS TISSUE: Status: ACTIVE | Noted: 2022-12-12

## 2022-12-12 PROBLEM — D48.5 NEOPLASM OF UNCERTAIN BEHAVIOR OF SKIN: Status: ACTIVE | Noted: 2022-12-12

## 2022-12-12 PROCEDURE — 99213 OFFICE O/P EST LOW 20 MIN: CPT | Mod: 25

## 2022-12-12 PROCEDURE — ? BIOPSY BY SHAVE METHOD

## 2022-12-12 PROCEDURE — 69100 BIOPSY OF EXTERNAL EAR: CPT

## 2022-12-12 PROCEDURE — ? COUNSELING

## 2022-12-12 ASSESSMENT — LOCATION ZONE DERM
LOCATION ZONE: TRUNK
LOCATION ZONE: ARM
LOCATION ZONE: EAR
LOCATION ZONE: NECK
LOCATION ZONE: FACE
LOCATION ZONE: SCALP

## 2022-12-12 ASSESSMENT — LOCATION DETAILED DESCRIPTION DERM
LOCATION DETAILED: RIGHT PROXIMAL DORSAL FOREARM
LOCATION DETAILED: LEFT INFERIOR UPPER BACK
LOCATION DETAILED: RIGHT LATERAL ABDOMEN
LOCATION DETAILED: RIGHT POSTERIOR SHOULDER
LOCATION DETAILED: MIDDLE STERNUM
LOCATION DETAILED: MID TRAPEZIAL NECK
LOCATION DETAILED: PERIUMBILICAL SKIN
LOCATION DETAILED: RIGHT ANTERIOR SHOULDER
LOCATION DETAILED: LEFT SUPERIOR UPPER BACK
LOCATION DETAILED: LEFT POSTERIOR SHOULDER
LOCATION DETAILED: SUPERIOR LUMBAR SPINE
LOCATION DETAILED: LEFT PROXIMAL DORSAL FOREARM
LOCATION DETAILED: RIGHT MEDIAL FRONTAL SCALP
LOCATION DETAILED: RIGHT LATERAL SUPERIOR CHEST
LOCATION DETAILED: LEFT MID-UPPER BACK
LOCATION DETAILED: RIGHT SUPERIOR TEMPLE
LOCATION DETAILED: EPIGASTRIC SKIN
LOCATION DETAILED: LEFT SCAPHA

## 2022-12-12 ASSESSMENT — LOCATION SIMPLE DESCRIPTION DERM
LOCATION SIMPLE: LOWER BACK
LOCATION SIMPLE: RIGHT SHOULDER
LOCATION SIMPLE: ABDOMEN
LOCATION SIMPLE: CHEST
LOCATION SIMPLE: LEFT UPPER BACK
LOCATION SIMPLE: LEFT EAR
LOCATION SIMPLE: LEFT SHOULDER
LOCATION SIMPLE: RIGHT FOREARM
LOCATION SIMPLE: TRAPEZIAL NECK
LOCATION SIMPLE: RIGHT TEMPLE
LOCATION SIMPLE: RIGHT SCALP
LOCATION SIMPLE: LEFT FOREARM

## 2022-12-12 NOTE — PROCEDURE: BIOPSY BY SHAVE METHOD
Detail Level: Detailed
Depth Of Biopsy: dermis
Was A Bandage Applied: Yes
Size Of Lesion In Cm: 0
Biopsy Type: H and E
Biopsy Method: Dermablade
Anesthesia Type: 1% lidocaine without epinephrine and a 1:10 solution of 8.4% sodium bicarbonate
Anesthesia Volume In Cc: 0.5
Hemostasis: Aluminum Chloride
Wound Care: Petrolatum
Dressing: bandage
Destruction After The Procedure: No
Type Of Destruction Used: Curettage
Curettage Text: The wound bed was treated with curettage after the biopsy was performed.
Cryotherapy Text: The wound bed was treated with cryotherapy after the biopsy was performed.
Electrodesiccation Text: The wound bed was treated with electrodesiccation after the biopsy was performed.
Electrodesiccation And Curettage Text: The wound bed was treated with electrodesiccation and curettage after the biopsy was performed.
Silver Nitrate Text: The wound bed was treated with silver nitrate after the biopsy was performed.
Lab: [id number]
Lab Facility: [id number]
Consent: Written consent was obtained and risks were reviewed including but not limited to scarring, infection, bleeding, scabbing, incomplete removal, nerve damage and allergy to anesthesia.
Post-Care Instructions: I reviewed with the patient in detail post-care instructions. Patient is to keep the biopsy site dry overnight, and then apply bacitracin twice daily until healed. Patient may apply hydrogen peroxide soaks to remove any crusting.
Notification Instructions: Patient will be notified of biopsy results. However, patient instructed to call the office if not contacted within 2 weeks.
Billing Type: Third-Party Bill
Information: Selecting Yes will display possible errors in your note based on the variables you have selected. This validation is only offered as a suggestion for you. PLEASE NOTE THAT THE VALIDATION TEXT WILL BE REMOVED WHEN YOU FINALIZE YOUR NOTE. IF YOU WANT TO FAX A PRELIMINARY NOTE YOU WILL NEED TO TOGGLE THIS TO 'NO' IF YOU DO NOT WANT IT IN YOUR FAXED NOTE.

## 2022-12-16 ENCOUNTER — TELEPHONE (OUTPATIENT)
Dept: FAMILY MEDICINE CLINIC | Facility: CLINIC | Age: 77
End: 2022-12-16

## 2022-12-16 RX ORDER — FLUCONAZOLE 150 MG/1
150 TABLET ORAL DAILY
Qty: 3 TABLET | Refills: 0 | Status: SHIPPED | OUTPATIENT
Start: 2022-12-16 | End: 2022-12-19

## 2022-12-16 NOTE — TELEPHONE ENCOUNTER
Sent in prescription for:     Requested Prescriptions     Signed Prescriptions Disp Refills    fluconazole (DIFLUCAN) 150 MG tablet 3 tablet 0     Sig: Take 1 tablet by mouth daily for 3 days     Authorizing Provider: Joshua Bashir

## 2022-12-16 NOTE — TELEPHONE ENCOUNTER
Pt called stating that she has a yeast infection and asking for some Diflucan to be sent to the pharmacy for her. Last OV 12/6/22.  Next OV 12/27/22

## 2023-01-12 ENCOUNTER — TELEPHONE (OUTPATIENT)
Dept: FAMILY MEDICINE CLINIC | Facility: CLINIC | Age: 78
End: 2023-01-12

## 2023-01-12 DIAGNOSIS — Z79.4 TYPE 2 DIABETES MELLITUS WITH HYPERGLYCEMIA, WITH LONG-TERM CURRENT USE OF INSULIN (HCC): Primary | ICD-10-CM

## 2023-01-12 DIAGNOSIS — E11.65 TYPE 2 DIABETES MELLITUS WITH HYPERGLYCEMIA, WITH LONG-TERM CURRENT USE OF INSULIN (HCC): Primary | ICD-10-CM

## 2023-01-12 NOTE — TELEPHONE ENCOUNTER
Aura with [employer] [facility] called stating that they are needing a new Rx for the Daryl Metrix meter, strips and lancets. Last OV 12/6/22.  Next OV 2/21/23

## 2023-01-13 RX ORDER — BLOOD-GLUCOSE METER
EACH MISCELLANEOUS
Qty: 1 EACH | Refills: 0 | Status: SHIPPED | OUTPATIENT
Start: 2023-01-13

## 2023-01-13 RX ORDER — CALCIUM CITRATE/VITAMIN D3 200MG-6.25
TABLET ORAL
Qty: 300 EACH | Refills: 3 | Status: SHIPPED | OUTPATIENT
Start: 2023-01-13

## 2023-01-13 RX ORDER — LANCETS 30 GAUGE
EACH MISCELLANEOUS
Qty: 300 EACH | Refills: 1 | Status: SHIPPED | OUTPATIENT
Start: 2023-01-13

## 2023-01-13 NOTE — TELEPHONE ENCOUNTER
Sent in prescription for:     Requested Prescriptions     Signed Prescriptions Disp Refills    Lancets MISC 300 each 1     Sig: Check 3 times daily for diabetes mellitus E11.9     Authorizing Provider: Glory Rao    blood glucose test strips (TRUE METRIX BLOOD GLUCOSE TEST) strip 300 each 3     Sig: Check 3 times daily for diabetes mellitus E11.9     Authorizing Provider: Glory Rao    Blood Glucose Monitoring Suppl (TRUE METRIX METER) YASMIN 1 each 0     Sig: Check daily for diabetes mellitus E11.9     Authorizing Provider: Glory Rao

## 2023-01-30 ENCOUNTER — OFFICE VISIT (OUTPATIENT)
Dept: CARDIOLOGY CLINIC | Age: 78
End: 2023-01-30
Payer: COMMERCIAL

## 2023-01-30 VITALS
HEART RATE: 70 BPM | WEIGHT: 248 LBS | SYSTOLIC BLOOD PRESSURE: 132 MMHG | BODY MASS INDEX: 35.5 KG/M2 | HEIGHT: 70 IN | DIASTOLIC BLOOD PRESSURE: 80 MMHG

## 2023-01-30 DIAGNOSIS — I48.21 PERMANENT ATRIAL FIBRILLATION (HCC): Primary | ICD-10-CM

## 2023-01-30 DIAGNOSIS — E66.01 SEVERE OBESITY (BMI 35.0-39.9) WITH COMORBIDITY (HCC): ICD-10-CM

## 2023-01-30 DIAGNOSIS — I10 ESSENTIAL HYPERTENSION: ICD-10-CM

## 2023-01-30 DIAGNOSIS — Z95.0 PACEMAKER: ICD-10-CM

## 2023-01-30 PROCEDURE — 1123F ACP DISCUSS/DSCN MKR DOCD: CPT | Performed by: INTERNAL MEDICINE

## 2023-01-30 PROCEDURE — 3075F SYST BP GE 130 - 139MM HG: CPT | Performed by: INTERNAL MEDICINE

## 2023-01-30 PROCEDURE — 3079F DIAST BP 80-89 MM HG: CPT | Performed by: INTERNAL MEDICINE

## 2023-01-30 PROCEDURE — 99213 OFFICE O/P EST LOW 20 MIN: CPT | Performed by: INTERNAL MEDICINE

## 2023-01-30 ASSESSMENT — ENCOUNTER SYMPTOMS
BOWEL INCONTINENCE: 0
BLURRED VISION: 0
ORTHOPNEA: 0
HOARSE VOICE: 0
HEMATOCHEZIA: 0
ABDOMINAL PAIN: 0
WHEEZING: 0
HEMATEMESIS: 0
DIARRHEA: 0
SPUTUM PRODUCTION: 0
SHORTNESS OF BREATH: 0
COLOR CHANGE: 0

## 2023-01-30 NOTE — PROGRESS NOTES
[facility] CARDIOLOGY  [address], [address]  PHONE: [phone]        23        NAME:  Giovanna Felder  : 1945  MRN: [id number]       SUBJECTIVE:   Giovanna Felder is a 68 y.o. female seen for a follow up visit regarding the following: The patient has a hx of permanent AF with AV jameel ablation and primary hypertension. On her last OV,Losartan was added due to elevated BP. She returns for follow up after medication change. Unfortunately,she reported multiple falls after starting Losartan. Losartan was stopped 3 weeks ago. She reports that her last fall was 1.5 weeks ago. Chief Complaint   Patient presents with    Atrial Fibrillation     3 month follow up       HPI:    Atrial Fibrillation  Presents for follow-up visit. Symptoms are negative for an AICD problem, bradycardia, chest pain, dizziness, hemodynamic instability, hypertension, hypotension, pacemaker problem, palpitations, shortness of breath, syncope, tachycardia and weakness. The symptoms have been stable. Past Medical History, Past Surgical History, Family history, Social History, and Medications were all reviewed with the patient today and updated as necessary.          Current Outpatient Medications:     Lancets MISC, Check 3 times daily for diabetes mellitus E11.9, Disp: 300 each, Rfl: 1    blood glucose test strips (TRUE METRIX BLOOD GLUCOSE TEST) strip, Check 3 times daily for diabetes mellitus E11.9, Disp: 300 each, Rfl: 3    Blood Glucose Monitoring Suppl (TRUE METRIX METER) YASMIN, Check daily for diabetes mellitus E11.9, Disp: 1 each, Rfl: 0    Insulin Degludec (TRESIBA FLEXTOUCH) 200 UNIT/ML SOPN, Inject 30 Units into the skin daily, Disp: 9 Adjustable Dose Pre-filled Pen Syringe, Rfl: 12    Insulin Pen Needle 31G X 5 MM MISC, 1 each by Does not apply route daily, Disp: 100 each, Rfl: 3    clonazePAM (KLONOPIN) 2 MG tablet, Take 1 tablet by mouth nightly as needed for Anxiety for up to 180 days., Disp: 30 tablet, Rfl: 5    venlafaxine (EFFEXOR XR) 150 MG extended release capsule, TAKE 1 CAPSULE EVERY DAY, Disp: 90 capsule, Rfl: 0    donepezil (ARICEPT) 10 MG tablet, TAKE 1 TABLET EVERY NIGHT, Disp: 90 tablet, Rfl: 0    mirtazapine (REMERON) 30 MG tablet, TAKE 1 TABLET EVERY NIGHT, Disp: 90 tablet, Rfl: 0    glimepiride (AMARYL) 4 MG tablet, TAKE 1 TABLET TWICE DAILY, Disp: 180 tablet, Rfl: 5    nystatin (MYCOSTATIN) 416334 UNIT/GM cream, Apply topically 2 times daily. , Disp: 30 g, Rfl: 2    oxybutynin (DITROPAN XL) 15 MG extended release tablet, Take 1 tablet by mouth in the morning.  TAKE 1 TABLET EVERY DAY., Disp: 90 tablet, Rfl: 3    mupirocin (BACTROBAN) 2 % ointment, APPLY TO AFFECTED AREA TWO TIMES A DAY FOR 7 DAYS., Disp: 22 g, Rfl: 5    ELIQUIS 5 MG TABS tablet, TAKE 1 TABLET TWICE DAILY, Disp: 180 tablet, Rfl: 3    Blood Glucose Monitoring Suppl (ACCU-CHEK GUIDE) w/Device KIT, Check 3 times daily for diabetes mellitus E11.9, Disp: 1 kit, Rfl: 5    blood glucose test strips (ACCU-CHEK GUIDE) strip, Check 3 times daily for diabetes mellitus E11.9, Disp: 300 strip, Rfl: 5    acetaminophen (TYLENOL) 500 MG tablet, Take 500 mg by mouth every 6 hours as needed, Disp: , Rfl:     vitamin D 25 MCG (1000 UT) CAPS, Take 1,000 Units by mouth daily, Disp: , Rfl:     metFORMIN (GLUCOPHAGE) 500 MG tablet, TAKE 1 TABLET TWICE DAILY WITH MEALS, Disp: , Rfl:     metoprolol succinate (TOPROL XL) 50 MG extended release tablet, TAKE 1 TABLET EVERY DAY, Disp: , Rfl:   Allergies   Allergen Reactions    Diltiazem Hcl Other (See Comments)     Dizziness, headache    Ozempic (0.25 Or 0.5 Mg-Dose) [Semaglutide(0.25 Or 0.5mg-Dos)] Diarrhea    Aspirin Nausea And Vomiting     Past Medical History:   Diagnosis Date    Anxiety 4/17/2013    Arthritis     Atrial flutter (HCC) 10/21/2016    Bradycardia 1/12/2017    Cancer (HCC)     nonhodkins lymphoma    Chronic bronchitis (HCC) 4/17/2013    CKD (chronic kidney disease) stage 4, GFR 15-29 ml/min ([facility] [address] 10/6/2022    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     COPD (chronic obstructive pulmonary disease) ([address] 4/18/2019    DM type 2 (diabetes mellitus, type 2) ([address]     type 2; A1C= 9.2 in Jan 2018- does not check glucose    DVT (deep venous thrombosis) ([address]     Essential hypertension 10/21/2016    GERD (gastroesophageal reflux disease) 4/17/2013    chronic    History of non-Hodgkin's lymphoma 12 yrs ago    remission    History of pulmonary embolus (PE) 20+ yrs    had a fx    History of tobacco abuse 52 yrs    quit 1 yr ago    Hyperlipidemia 4/17/2013    Insomnia 9/44/7930    Metabolic encephalopathy 08/2/4329    Obesity (BMI 30-39. 9)     BMI- 33.2 (5/7/18)    Pacemaker     Paroxysmal atrial fibrillation ([address] 6/6/2017    Permanent atrial fibrillation ([facility] [address] 6/20/2019    Poor historian 05/07/2018    Psychiatric disorder     PUD (peptic ulcer disease) 4/17/2013    Pulmonary embolism ([address]     Thyroid disease     partial thyroidectomy     Past Surgical History:   Procedure Laterality Date    APPENDECTOMY      BREAST SURGERY  40 yrs ago    rupture with removal and replacement    CATARACT REMOVAL  2010    bilateral    FRACTURE SURGERY      right arm with pinning and hardware removal    NEUROLOGICAL SURGERY      back surgery    ORTHOPEDIC SURGERY      4/2018  Fractured L Ankle    PACEMAKER      LAVINIA AND BSO (CERVIX REMOVED)      THYROIDECTOMY, PARTIAL  5/00    \"autumn\"    TOTAL COLECTOMY       Family History   Problem Relation Age of Onset    Liver Disease Maternal Uncle     Cancer Sister         breast    Dementia Father         alzheimers    Cancer Mother         colon/stomach      Social History     Tobacco Use    Smoking status: Former     Packs/day: 1.00     Types: Cigarettes     Quit date: 7/27/2012     Years since quitting: 10.5    Smokeless tobacco: Never   Substance Use Topics    Alcohol use: No     Alcohol/week: 0.0 standard drinks       ROS:    Review of Systems Constitutional: Negative for chills, decreased appetite, diaphoresis, fever and malaise/fatigue. HENT:  Negative for congestion, hearing loss, hoarse voice and nosebleeds. Eyes:  Negative for blurred vision. Cardiovascular:  Negative for chest pain, claudication, cyanosis, dyspnea on exertion, irregular heartbeat, leg swelling, near-syncope, orthopnea, palpitations, paroxysmal nocturnal dyspnea and syncope. Respiratory:  Negative for shortness of breath, sputum production and wheezing. Endocrine: Negative for polydipsia, polyphagia and polyuria. Skin:  Negative for color change. Musculoskeletal:  Positive for falls. Gastrointestinal:  Negative for abdominal pain, bowel incontinence, diarrhea, hematemesis and hematochezia. Genitourinary:  Negative for dysuria, frequency and hematuria. Neurological:  Negative for dizziness, focal weakness, light-headedness, loss of balance, numbness, sensory change and weakness. Psychiatric/Behavioral:  Negative for altered mental status and memory loss. PHYSICAL EXAM:   /80   Pulse 70   Ht 5' 10\" (1.778 m)   Wt 248 lb (112.5 kg)   BMI 35.58 kg/m²      Physical Exam  Constitutional:       Appearance: Normal appearance. She is obese. HENT:      Head: Normocephalic and atraumatic. Nose: Nose normal.   Eyes:      Extraocular Movements: Extraocular movements intact. Pupils: Pupils are equal, round, and reactive to light. Neck:      Vascular: No carotid bruit. Cardiovascular:      Rate and Rhythm: Regular rhythm. Pulses: Normal pulses. Heart sounds: No murmur heard. Pulmonary:      Effort: Pulmonary effort is normal.      Breath sounds: Normal breath sounds. Abdominal:      General: Abdomen is flat. Bowel sounds are normal.      Palpations: Abdomen is soft. Musculoskeletal:         General: Normal range of motion. Cervical back: Normal range of motion and neck supple. Skin:     General: Skin is warm and dry. Neurological:      General: No focal deficit present. Mental Status: She is alert and oriented to person, place, and time. Psychiatric:         Mood and Affect: Mood normal.       Medical problems and test results were reviewed with the patient today. No results found for this or any previous visit (from the past 672 hour(s)). No results found for: CHOL, CHOLPOCT, CHOLX, CHLST, CHOLV, HDL, HDLPOC, HDLC, LDL, LDLC, VLDLC, VLDL, TGLX, TRIGL  No results found for any visits on 01/30/23. ASSESSMENT and PLAN    Barrett Tirado was seen today for atrial fibrillation. Diagnoses and all orders for this visit:    Permanent atrial fibrillation (HCC):Continue Eliquis. Consider a watchman device if falls continue. Severe obesity (BMI 35.0-39. 9) with comorbidity [facility])    [facility] clinic as scheduled. Essential hypertension:Stable on Toprol. Continue Toprol. Disposition:    Return in about 6 months (around 7/30/2023).                 Nellie Palacios MD  1/30/2023  1:59 PM

## 2023-02-02 ENCOUNTER — APPOINTMENT (RX ONLY)
Dept: URBAN - METROPOLITAN AREA CLINIC 23 | Facility: CLINIC | Age: 78
Setting detail: DERMATOLOGY
End: 2023-02-02

## 2023-02-02 DIAGNOSIS — L57.0 ACTINIC KERATOSIS: ICD-10-CM

## 2023-02-02 DIAGNOSIS — L21.8 OTHER SEBORRHEIC DERMATITIS: ICD-10-CM

## 2023-02-02 PROCEDURE — 99214 OFFICE O/P EST MOD 30 MIN: CPT | Mod: 25

## 2023-02-02 PROCEDURE — ? PRESCRIPTION

## 2023-02-02 PROCEDURE — ? LIQUID NITROGEN

## 2023-02-02 PROCEDURE — 17000 DESTRUCT PREMALG LESION: CPT

## 2023-02-02 PROCEDURE — ? COUNSELING

## 2023-02-02 RX ORDER — CLOBETASOL PROPIONATE 0.5 MG/ML
SOLUTION TOPICAL
Qty: 50 | Refills: 6 | Status: ERX | COMMUNITY
Start: 2023-02-02

## 2023-02-02 RX ADMIN — CLOBETASOL PROPIONATE: 0.5 SOLUTION TOPICAL at 00:00

## 2023-02-02 ASSESSMENT — LOCATION SIMPLE DESCRIPTION DERM: LOCATION SIMPLE: LEFT EAR

## 2023-02-02 ASSESSMENT — LOCATION DETAILED DESCRIPTION DERM: LOCATION DETAILED: LEFT SCAPHA

## 2023-02-02 ASSESSMENT — LOCATION ZONE DERM: LOCATION ZONE: EAR

## 2023-03-07 DIAGNOSIS — R00.1 BRADYCARDIA: ICD-10-CM

## 2023-03-07 DIAGNOSIS — Z95.0 PACEMAKER: ICD-10-CM

## (undated) DEVICE — AMD ANTIMICROBIAL GAUZE SPONGES,12 PLY USP TYPE VII, 0.2% POLYHEXAMETHYLENE BIGUANIDE HCI (PHMB): Brand: CURITY

## (undated) DEVICE — (D)PREP SKN CHLRAPRP APPL 26ML -- CONVERT TO ITEM 371833

## (undated) DEVICE — SPLINT CAST W4XL30IN WHT THMB FBRGLS PRECUT INTLOK WRINKLE

## (undated) DEVICE — SOLUTION IV 1000ML 0.9% SOD CHL

## (undated) DEVICE — Device

## (undated) DEVICE — BIT DRL DIA2.8MM SHT CALIB QUIK CPL W/ STP PRO-PAK

## (undated) DEVICE — SUTURE MCRYL SZ 3-0 L27IN ABSRB UD L19MM PS-2 3/8 CIR PRIM Y427H

## (undated) DEVICE — STERILE HOOK LOCK LATEX FREE ELASTIC BANDAGE 6INX5YD: Brand: HOOK LOCK™

## (undated) DEVICE — 2.0MM DRILL BIT WITH DEPTH MARK/QC/140MM

## (undated) DEVICE — PRECISION THIN, OFFSET (5.5 X 0.38 X 25.0MM)

## (undated) DEVICE — PADDING CAST W6INXL4YD ST COT COHESIVE HND TEARABLE SPEC

## (undated) DEVICE — SLIM BODY SKIN STAPLER: Brand: APPOSE ULC

## (undated) DEVICE — PADDING CAST W4INXL4YD ST COT COHESIVE HND TEARABLE SPEC

## (undated) DEVICE — 2.5MM DRILL BIT/QC/GOLD/110MM

## (undated) DEVICE — SPLINT MAT XF SPEC 5X30IN --

## (undated) DEVICE — BNDG SOF-FORM 2X75 STRL LF --

## (undated) DEVICE — DRAPE XR C ARM 41X74IN LF --

## (undated) DEVICE — BIT DRL L125MM DIA2.7MM QUIK CPL 3 FLUT

## (undated) DEVICE — BNDG ELAS COBAN 2INX5YD NS --

## (undated) DEVICE — 1.25MM NON-THREADED GUIDE WIRE 150MM

## (undated) DEVICE — SUTURE MCRYL SZ 2-0 L27IN ABSRB VLT CT-2 L26MM 1/2 CIR Y333H

## (undated) DEVICE — ZIMMER® STERILE DISPOSABLE TOURNIQUET CUFF WITH PLC, DUAL PORT, SINGLE BLADDER, 18 IN. (46 CM)

## (undated) DEVICE — SUT ETHLN 3-0 18IN PS1 BLK --

## (undated) DEVICE — BUTTON SWITCH PENCIL BLADE ELECTRODE, HOLSTER: Brand: EDGE

## (undated) DEVICE — SUTURE VCRL SZ 2-0 L36IN ABSRB UD L36MM CT-1 1/2 CIR J945H

## (undated) DEVICE — ZIMMER® STERILE DISPOSABLE TOURNIQUET CUFF WITH PROTECTIVE SLEEVE, DUAL PORT, SINGLE BLADDER, 34 IN. (86 CM)

## (undated) DEVICE — REM POLYHESIVE ADULT PATIENT RETURN ELECTRODE: Brand: VALLEYLAB

## (undated) DEVICE — AMD ANTIMICROBIAL BANDAGE ROLL,6 PLY: Brand: KERLIX

## (undated) DEVICE — DRAPE C ARM W54XL84IN MINI FOR OEC 6800

## (undated) DEVICE — CURITY NON-ADHERENT STRIPS: Brand: CURITY

## (undated) DEVICE — ABDOMINAL PAD: Brand: DERMACEA

## (undated) DEVICE — SUTURE VCRL SZ 0 L27IN ABSRB UD L36MM CP-1 1/2 CIR REV CUT J267H

## (undated) DEVICE — STERILE HOOK LOCK LATEX FREE ELASTIC BANDAGE 4INX5YD: Brand: HOOK LOCK™

## (undated) DEVICE — 3M™ COBAN™ NL STERILE NON-LATEX SELF-ADHERENT WRAP, 2082S, 2 IN X 5 YD (5 CM X 4,5 M), 36 ROLLS/CASE: Brand: 3M™ COBAN™

## (undated) DEVICE — NON-ADHERING DRESSING: Brand: ADAPTIC®

## (undated) DEVICE — 2000CC GUARDIAN II: Brand: GUARDIAN